# Patient Record
Sex: MALE | Race: WHITE | Employment: OTHER | URBAN - METROPOLITAN AREA
[De-identification: names, ages, dates, MRNs, and addresses within clinical notes are randomized per-mention and may not be internally consistent; named-entity substitution may affect disease eponyms.]

---

## 2017-01-09 ENCOUNTER — HOSPITAL ENCOUNTER (OUTPATIENT)
Dept: CT IMAGING | Age: 72
Discharge: HOME | DRG: 707 | End: 2017-01-09
Attending: UROLOGY
Payer: MEDICARE

## 2017-01-09 DIAGNOSIS — C61 PROSTATE CANCER (HCC): ICD-10-CM

## 2017-01-09 DIAGNOSIS — N28.89 RENAL MASS: ICD-10-CM

## 2017-01-10 NOTE — PROGRESS NOTES
I reviewed with pt. Explained that this may represent a met but would be difficult to biopsy given size. I offered pulm consult prior to any intervention but pt opted to proceed with previous plan and have this followed post op.

## 2017-01-11 ENCOUNTER — ANESTHESIA EVENT (OUTPATIENT)
Dept: SURGERY | Age: 72
DRG: 707 | End: 2017-01-11
Payer: MEDICARE

## 2017-01-12 ENCOUNTER — SURGERY (OUTPATIENT)
Age: 72
End: 2017-01-12

## 2017-01-12 ENCOUNTER — ANESTHESIA (OUTPATIENT)
Dept: SURGERY | Age: 72
DRG: 707 | End: 2017-01-12
Payer: MEDICARE

## 2017-01-12 PROBLEM — C61 PROSTATE CANCER (HCC): Status: ACTIVE | Noted: 2017-01-12

## 2017-01-12 PROCEDURE — 77030019908 HC STETH ESOPH SIMS -A: Performed by: ANESTHESIOLOGY

## 2017-01-12 PROCEDURE — 74011250636 HC RX REV CODE- 250/636: Performed by: UROLOGY

## 2017-01-12 PROCEDURE — 74011250636 HC RX REV CODE- 250/636

## 2017-01-12 PROCEDURE — 77030008703 HC TU ET UNCUF COVD -A: Performed by: ANESTHESIOLOGY

## 2017-01-12 PROCEDURE — 74011000250 HC RX REV CODE- 250

## 2017-01-12 PROCEDURE — 77030008477 HC STYL SATN SLP COVD -A: Performed by: ANESTHESIOLOGY

## 2017-01-12 PROCEDURE — 74011250636 HC RX REV CODE- 250/636: Performed by: ANESTHESIOLOGY

## 2017-01-12 PROCEDURE — 77030020782 HC GWN BAIR PAWS FLX 3M -B: Performed by: ANESTHESIOLOGY

## 2017-01-12 PROCEDURE — 77030008771 HC TU NG SALEM SUMP -A: Performed by: ANESTHESIOLOGY

## 2017-01-12 RX ORDER — PROPOFOL 10 MG/ML
INJECTION, EMULSION INTRAVENOUS AS NEEDED
Status: DISCONTINUED | OUTPATIENT
Start: 2017-01-12 | End: 2017-01-12 | Stop reason: HOSPADM

## 2017-01-12 RX ORDER — PROMETHAZINE HYDROCHLORIDE 25 MG/ML
INJECTION, SOLUTION INTRAMUSCULAR; INTRAVENOUS AS NEEDED
Status: DISCONTINUED | OUTPATIENT
Start: 2017-01-12 | End: 2017-01-12 | Stop reason: HOSPADM

## 2017-01-12 RX ORDER — FENTANYL CITRATE 50 UG/ML
INJECTION, SOLUTION INTRAMUSCULAR; INTRAVENOUS AS NEEDED
Status: DISCONTINUED | OUTPATIENT
Start: 2017-01-12 | End: 2017-01-12 | Stop reason: HOSPADM

## 2017-01-12 RX ORDER — DEXAMETHASONE SODIUM PHOSPHATE 4 MG/ML
INJECTION, SOLUTION INTRA-ARTICULAR; INTRALESIONAL; INTRAMUSCULAR; INTRAVENOUS; SOFT TISSUE AS NEEDED
Status: DISCONTINUED | OUTPATIENT
Start: 2017-01-12 | End: 2017-01-12 | Stop reason: HOSPADM

## 2017-01-12 RX ORDER — ROCURONIUM BROMIDE 10 MG/ML
INJECTION, SOLUTION INTRAVENOUS AS NEEDED
Status: DISCONTINUED | OUTPATIENT
Start: 2017-01-12 | End: 2017-01-12 | Stop reason: HOSPADM

## 2017-01-12 RX ORDER — CEFAZOLIN SODIUM 1 G/3ML
INJECTION, POWDER, FOR SOLUTION INTRAMUSCULAR; INTRAVENOUS AS NEEDED
Status: DISCONTINUED | OUTPATIENT
Start: 2017-01-12 | End: 2017-01-12 | Stop reason: HOSPADM

## 2017-01-12 RX ORDER — ONDANSETRON 2 MG/ML
INJECTION INTRAMUSCULAR; INTRAVENOUS AS NEEDED
Status: DISCONTINUED | OUTPATIENT
Start: 2017-01-12 | End: 2017-01-12 | Stop reason: HOSPADM

## 2017-01-12 RX ORDER — LIDOCAINE HYDROCHLORIDE 20 MG/ML
INJECTION, SOLUTION EPIDURAL; INFILTRATION; INTRACAUDAL; PERINEURAL AS NEEDED
Status: DISCONTINUED | OUTPATIENT
Start: 2017-01-12 | End: 2017-01-12 | Stop reason: HOSPADM

## 2017-01-12 RX ORDER — GLYCOPYRROLATE 0.2 MG/ML
INJECTION INTRAMUSCULAR; INTRAVENOUS AS NEEDED
Status: DISCONTINUED | OUTPATIENT
Start: 2017-01-12 | End: 2017-01-12 | Stop reason: HOSPADM

## 2017-01-12 RX ORDER — NEOSTIGMINE METHYLSULFATE 1 MG/ML
INJECTION INTRAVENOUS AS NEEDED
Status: DISCONTINUED | OUTPATIENT
Start: 2017-01-12 | End: 2017-01-12 | Stop reason: HOSPADM

## 2017-01-12 RX ADMIN — ROCURONIUM BROMIDE 10 MG: 10 INJECTION, SOLUTION INTRAVENOUS at 09:15

## 2017-01-12 RX ADMIN — GLYCOPYRROLATE 0.4 MG: 0.2 INJECTION INTRAMUSCULAR; INTRAVENOUS at 11:34

## 2017-01-12 RX ADMIN — DEXAMETHASONE SODIUM PHOSPHATE 4 MG: 4 INJECTION, SOLUTION INTRA-ARTICULAR; INTRALESIONAL; INTRAMUSCULAR; INTRAVENOUS; SOFT TISSUE at 07:55

## 2017-01-12 RX ADMIN — ROCURONIUM BROMIDE 5 MG: 10 INJECTION, SOLUTION INTRAVENOUS at 10:47

## 2017-01-12 RX ADMIN — CEFAZOLIN SODIUM 2 G: 1 INJECTION, POWDER, FOR SOLUTION INTRAMUSCULAR; INTRAVENOUS at 11:30

## 2017-01-12 RX ADMIN — ROCURONIUM BROMIDE 5 MG: 10 INJECTION, SOLUTION INTRAVENOUS at 10:32

## 2017-01-12 RX ADMIN — PROMETHAZINE HYDROCHLORIDE 12.5 MG: 25 INJECTION, SOLUTION INTRAMUSCULAR; INTRAVENOUS at 11:49

## 2017-01-12 RX ADMIN — NEOSTIGMINE METHYLSULFATE 3 MG: 1 INJECTION INTRAVENOUS at 11:34

## 2017-01-12 RX ADMIN — ROCURONIUM BROMIDE 10 MG: 10 INJECTION, SOLUTION INTRAVENOUS at 08:25

## 2017-01-12 RX ADMIN — FENTANYL CITRATE 50 MCG: 50 INJECTION, SOLUTION INTRAMUSCULAR; INTRAVENOUS at 08:49

## 2017-01-12 RX ADMIN — LIDOCAINE HYDROCHLORIDE 70 MG: 20 INJECTION, SOLUTION EPIDURAL; INFILTRATION; INTRACAUDAL; PERINEURAL at 07:42

## 2017-01-12 RX ADMIN — CEFAZOLIN 2 G: 1 INJECTION, POWDER, FOR SOLUTION INTRAMUSCULAR; INTRAVENOUS; PARENTERAL at 07:44

## 2017-01-12 RX ADMIN — BUPIVACAINE HYDROCHLORIDE 20 ML: 2.5 INJECTION, SOLUTION EPIDURAL; INFILTRATION; INTRACAUDAL; PERINEURAL at 11:46

## 2017-01-12 RX ADMIN — FENTANYL CITRATE 25 MCG: 50 INJECTION, SOLUTION INTRAMUSCULAR; INTRAVENOUS at 09:31

## 2017-01-12 RX ADMIN — SODIUM CHLORIDE, SODIUM LACTATE, POTASSIUM CHLORIDE, AND CALCIUM CHLORIDE: 600; 310; 30; 20 INJECTION, SOLUTION INTRAVENOUS at 08:25

## 2017-01-12 RX ADMIN — ROCURONIUM BROMIDE 10 MG: 10 INJECTION, SOLUTION INTRAVENOUS at 08:45

## 2017-01-12 RX ADMIN — PROPOFOL 200 MG: 10 INJECTION, EMULSION INTRAVENOUS at 07:42

## 2017-01-12 RX ADMIN — FENTANYL CITRATE 25 MCG: 50 INJECTION, SOLUTION INTRAMUSCULAR; INTRAVENOUS at 11:30

## 2017-01-12 RX ADMIN — FENTANYL CITRATE 25 MCG: 50 INJECTION, SOLUTION INTRAMUSCULAR; INTRAVENOUS at 11:39

## 2017-01-12 RX ADMIN — ROCURONIUM BROMIDE 10 MG: 10 INJECTION, SOLUTION INTRAVENOUS at 09:31

## 2017-01-12 RX ADMIN — ROCURONIUM BROMIDE 10 MG: 10 INJECTION, SOLUTION INTRAVENOUS at 08:06

## 2017-01-12 RX ADMIN — SODIUM CHLORIDE, SODIUM LACTATE, POTASSIUM CHLORIDE, AND CALCIUM CHLORIDE: 600; 310; 30; 20 INJECTION, SOLUTION INTRAVENOUS at 11:21

## 2017-01-12 RX ADMIN — FENTANYL CITRATE 100 MCG: 50 INJECTION, SOLUTION INTRAMUSCULAR; INTRAVENOUS at 07:42

## 2017-01-12 RX ADMIN — ONDANSETRON 4 MG: 2 INJECTION INTRAMUSCULAR; INTRAVENOUS at 11:20

## 2017-01-12 RX ADMIN — FENTANYL CITRATE 50 MCG: 50 INJECTION, SOLUTION INTRAMUSCULAR; INTRAVENOUS at 08:13

## 2017-01-12 RX ADMIN — ROCURONIUM BROMIDE 40 MG: 10 INJECTION, SOLUTION INTRAVENOUS at 07:43

## 2017-01-12 RX ADMIN — FENTANYL CITRATE 25 MCG: 50 INJECTION, SOLUTION INTRAMUSCULAR; INTRAVENOUS at 09:46

## 2017-01-12 RX ADMIN — ROCURONIUM BROMIDE 10 MG: 10 INJECTION, SOLUTION INTRAVENOUS at 10:06

## 2017-01-12 NOTE — ANESTHESIA PREPROCEDURE EVALUATION
Anesthetic History   No history of anesthetic complications            Review of Systems / Medical History  Patient summary reviewed, nursing notes reviewed and pertinent labs reviewed    Pulmonary          Undiagnosed apnea         Neuro/Psych              Cardiovascular    Hypertension: well controlled        Dysrhythmias : atrial fibrillation  Hyperlipidemia    Exercise tolerance: >4 METS     GI/Hepatic/Renal     GERD: well controlled    Renal disease: CRI      Comments: UC Endo/Other        Arthritis     Other Findings              Physical Exam    Airway  Mallampati: II  TM Distance: 4 - 6 cm  Neck ROM: normal range of motion   Mouth opening: Normal     Cardiovascular  Regular rate and rhythm,  S1 and S2 normal,  no murmur, click, rub, or gallop  Rhythm: regular           Dental    Dentition: Full upper dentures     Pulmonary  Breath sounds clear to auscultation               Abdominal  GI exam deferred       Other Findings            Anesthetic Plan    ASA: 2  Anesthesia type: general          Induction: Intravenous  Anesthetic plan and risks discussed with: Patient

## 2017-01-12 NOTE — ANESTHESIA POSTPROCEDURE EVALUATION
Post-Anesthesia Evaluation and Assessment    Patient: Anjel Hamilton MRN: [id number]  SSN: [id number]    YOB: 1945  Age: 80 y.o. Sex: male       Cardiovascular Function/Vital Signs  Visit Vitals    /85    Pulse 78    Temp 36.5 °C (97.7 °F)    Resp 12    SpO2 96%       Patient is status post general anesthesia for Procedure(s):  PROSTATECTOMY ROBOTIC ASSISTED WITH RIGHT LYMPH NODE DISSECTION. Nausea/Vomiting: None    Postoperative hydration reviewed and adequate. Pain:  Pain Scale 1: Visual (01/12/17 1337)  Pain Intensity 1: 9 (01/12/17 1206)   Managed    Neurological Status:   Neuro (WDL): Exceptions to WDL (01/12/17 1238)  Neuro  Neurologic State: Drowsy (01/12/17 1238)  LUE Motor Response: Purposeful (01/12/17 1238)  LLE Motor Response: Purposeful (01/12/17 1238)  RUE Motor Response: Purposeful (01/12/17 1238)  RLE Motor Response: Purposeful (01/12/17 1238)   At baseline    Mental Status and Level of Consciousness: Arousable    Pulmonary Status:   O2 Device: Nasal cannula (01/12/17 1157)   Adequate oxygenation and airway patent    Complications related to anesthesia: None    Post-anesthesia assessment completed.  No concerns    Signed By: Jennifer Herrera MD     January 12, 2017

## 2017-01-29 ENCOUNTER — ANESTHESIA EVENT (OUTPATIENT)
Dept: SURGERY | Age: 72
End: 2017-01-29
Payer: MEDICARE

## 2017-01-29 ENCOUNTER — ANESTHESIA (OUTPATIENT)
Dept: SURGERY | Age: 72
End: 2017-01-29
Payer: MEDICARE

## 2017-01-29 ENCOUNTER — HOSPITAL ENCOUNTER (OUTPATIENT)
Age: 72
Setting detail: OBSERVATION
Discharge: HOME | End: 2017-01-31
Attending: EMERGENCY MEDICINE | Admitting: UROLOGY
Payer: MEDICARE

## 2017-01-29 DIAGNOSIS — R33.9 URINARY RETENTION: Primary | ICD-10-CM

## 2017-01-29 LAB
ALBUMIN SERPL BCP-MCNC: 3.6 G/DL (ref 3.2–4.6)
ALBUMIN/GLOB SERPL: 0.9 {RATIO} (ref 1.2–3.5)
ALP SERPL-CCNC: 79 U/L (ref 50–136)
ALT SERPL-CCNC: 18 U/L (ref 12–65)
ANION GAP BLD CALC-SCNC: 16 MMOL/L (ref 7–16)
AST SERPL W P-5'-P-CCNC: 16 U/L (ref 15–37)
BASOPHILS # BLD AUTO: 0.1 K/UL (ref 0–0.2)
BASOPHILS # BLD: 1 % (ref 0–2)
BILIRUB SERPL-MCNC: 0.4 MG/DL (ref 0.2–1.1)
BUN SERPL-MCNC: 39 MG/DL (ref 8–23)
CALCIUM SERPL-MCNC: 8.9 MG/DL (ref 8.3–10.4)
CHLORIDE SERPL-SCNC: 103 MMOL/L (ref 98–107)
CO2 SERPL-SCNC: 18 MMOL/L (ref 21–32)
CREAT SERPL-MCNC: 2.27 MG/DL (ref 0.8–1.5)
DIFFERENTIAL METHOD BLD: ABNORMAL
EOSINOPHIL # BLD: 0.1 K/UL (ref 0–0.8)
EOSINOPHIL NFR BLD: 1 % (ref 0.5–7.8)
ERYTHROCYTE [DISTWIDTH] IN BLOOD BY AUTOMATED COUNT: 13.8 % (ref 11.9–14.6)
GLOBULIN SER CALC-MCNC: 3.8 G/DL (ref 2.3–3.5)
GLUCOSE SERPL-MCNC: 156 MG/DL (ref 65–100)
HCT VFR BLD AUTO: 36.5 % (ref 41.1–50.3)
HGB BLD-MCNC: 12.2 G/DL (ref 13.6–17.2)
IMM GRANULOCYTES # BLD: 0 K/UL (ref 0–0.5)
IMM GRANULOCYTES NFR BLD AUTO: 0 % (ref 0–5)
LYMPHOCYTES # BLD AUTO: 33 % (ref 13–44)
LYMPHOCYTES # BLD: 4.4 K/UL (ref 0.5–4.6)
MCH RBC QN AUTO: 30 PG (ref 26.1–32.9)
MCHC RBC AUTO-ENTMCNC: 33.4 G/DL (ref 31.4–35)
MCV RBC AUTO: 89.7 FL (ref 79.6–97.8)
MONOCYTES # BLD: 0.9 K/UL (ref 0.1–1.3)
MONOCYTES NFR BLD AUTO: 7 % (ref 4–12)
NEUTS SEG # BLD: 7.8 K/UL (ref 1.7–8.2)
NEUTS SEG NFR BLD AUTO: 58 % (ref 43–78)
PLATELET # BLD AUTO: 397 K/UL (ref 150–450)
PLATELET COMMENTS,PCOM: ADEQUATE
PMV BLD AUTO: 10.3 FL (ref 10.8–14.1)
POTASSIUM SERPL-SCNC: 3.4 MMOL/L (ref 3.5–5.1)
PROT SERPL-MCNC: 7.4 G/DL (ref 6.3–8.2)
RBC # BLD AUTO: 4.07 M/UL (ref 4.23–5.67)
RBC MORPH BLD: ABNORMAL
SODIUM SERPL-SCNC: 137 MMOL/L (ref 136–145)
WBC # BLD AUTO: 13.3 K/UL (ref 4.3–11.1)
WBC MORPH BLD: ABNORMAL

## 2017-01-29 PROCEDURE — 77030005520 HC CATH URETH FOL38 BARD -A

## 2017-01-29 PROCEDURE — 51700 IRRIGATION OF BLADDER: CPT | Performed by: EMERGENCY MEDICINE

## 2017-01-29 PROCEDURE — A9270 NON-COVERED ITEM OR SERVICE: HCPCS | Performed by: UROLOGY

## 2017-01-29 PROCEDURE — 77030032490 HC SLV COMPR SCD KNE COVD -B: Performed by: UROLOGY

## 2017-01-29 PROCEDURE — 74011250636 HC RX REV CODE- 250/636

## 2017-01-29 PROCEDURE — 85025 COMPLETE CBC W/AUTO DIFF WBC: CPT | Performed by: EMERGENCY MEDICINE

## 2017-01-29 PROCEDURE — 80053 COMPREHEN METABOLIC PANEL: CPT | Performed by: EMERGENCY MEDICINE

## 2017-01-29 PROCEDURE — 76060000032 HC ANESTHESIA 0.5 TO 1 HR: Performed by: UROLOGY

## 2017-01-29 PROCEDURE — 74011250636 HC RX REV CODE- 250/636: Performed by: EMERGENCY MEDICINE

## 2017-01-29 PROCEDURE — 77030019927 HC TBNG IRR CYSTO BAXT -A: Performed by: UROLOGY

## 2017-01-29 PROCEDURE — 51702 INSERT TEMP BLADDER CATH: CPT | Performed by: EMERGENCY MEDICINE

## 2017-01-29 PROCEDURE — 77030008771 HC TU NG SALEM SUMP -A: Performed by: ANESTHESIOLOGY

## 2017-01-29 PROCEDURE — 81003 URINALYSIS AUTO W/O SCOPE: CPT | Performed by: EMERGENCY MEDICINE

## 2017-01-29 PROCEDURE — 74011250637 HC RX REV CODE- 250/637: Performed by: UROLOGY

## 2017-01-29 PROCEDURE — 74011250636 HC RX REV CODE- 250/636: Performed by: UROLOGY

## 2017-01-29 PROCEDURE — 96375 TX/PRO/DX INJ NEW DRUG ADDON: CPT | Performed by: EMERGENCY MEDICINE

## 2017-01-29 PROCEDURE — 99283 EMERGENCY DEPT VISIT LOW MDM: CPT | Performed by: EMERGENCY MEDICINE

## 2017-01-29 PROCEDURE — 99218 HC RM OBSERVATION: CPT

## 2017-01-29 PROCEDURE — C1769 GUIDE WIRE: HCPCS | Performed by: UROLOGY

## 2017-01-29 PROCEDURE — 77030008703 HC TU ET UNCUF COVD -A: Performed by: ANESTHESIOLOGY

## 2017-01-29 PROCEDURE — 74011000250 HC RX REV CODE- 250

## 2017-01-29 PROCEDURE — G0378 HOSPITAL OBSERVATION PER HR: HCPCS

## 2017-01-29 PROCEDURE — 76010000138 HC OR TIME 0.5 TO 1 HR: Performed by: UROLOGY

## 2017-01-29 PROCEDURE — 77030008477 HC STYL SATN SLP COVD -A: Performed by: ANESTHESIOLOGY

## 2017-01-29 PROCEDURE — 74011000258 HC RX REV CODE- 258: Performed by: UROLOGY

## 2017-01-29 PROCEDURE — 77030018832 HC SOL IRR H20 ICUM -A: Performed by: UROLOGY

## 2017-01-29 PROCEDURE — 96374 THER/PROPH/DIAG INJ IV PUSH: CPT | Performed by: EMERGENCY MEDICINE

## 2017-01-29 PROCEDURE — 77030011942 HC CATH URETH FOL46 BARD -B: Performed by: UROLOGY

## 2017-01-29 PROCEDURE — 77030018836 HC SOL IRR NACL ICUM -A

## 2017-01-29 PROCEDURE — 76210000006 HC OR PH I REC 0.5 TO 1 HR: Performed by: UROLOGY

## 2017-01-29 PROCEDURE — 74011636637 HC RX REV CODE- 636/637: Performed by: UROLOGY

## 2017-01-29 RX ORDER — SODIUM CHLORIDE, SODIUM LACTATE, POTASSIUM CHLORIDE, CALCIUM CHLORIDE 600; 310; 30; 20 MG/100ML; MG/100ML; MG/100ML; MG/100ML
INJECTION, SOLUTION INTRAVENOUS
Status: DISCONTINUED | OUTPATIENT
Start: 2017-01-29 | End: 2017-01-29 | Stop reason: HOSPADM

## 2017-01-29 RX ORDER — PREDNISONE 5 MG/1
5 TABLET ORAL
Status: DISCONTINUED | OUTPATIENT
Start: 2017-01-29 | End: 2017-01-31 | Stop reason: HOSPADM

## 2017-01-29 RX ORDER — FENTANYL CITRATE 50 UG/ML
INJECTION, SOLUTION INTRAMUSCULAR; INTRAVENOUS AS NEEDED
Status: DISCONTINUED | OUTPATIENT
Start: 2017-01-29 | End: 2017-01-29 | Stop reason: HOSPADM

## 2017-01-29 RX ORDER — PREDNISONE 5 MG/1
5 TABLET ORAL
Status: DISCONTINUED | OUTPATIENT
Start: 2017-01-30 | End: 2017-01-31 | Stop reason: HOSPADM

## 2017-01-29 RX ORDER — DULOXETIN HYDROCHLORIDE 30 MG/1
30 CAPSULE, DELAYED RELEASE ORAL
Status: DISCONTINUED | OUTPATIENT
Start: 2017-01-29 | End: 2017-01-31 | Stop reason: HOSPADM

## 2017-01-29 RX ORDER — NALOXONE HYDROCHLORIDE 0.4 MG/ML
0.4 INJECTION, SOLUTION INTRAMUSCULAR; INTRAVENOUS; SUBCUTANEOUS AS NEEDED
Status: DISCONTINUED | OUTPATIENT
Start: 2017-01-29 | End: 2017-01-31 | Stop reason: HOSPADM

## 2017-01-29 RX ORDER — PANTOPRAZOLE SODIUM 40 MG/1
40 TABLET, DELAYED RELEASE ORAL
Status: DISCONTINUED | OUTPATIENT
Start: 2017-01-30 | End: 2017-01-31 | Stop reason: HOSPADM

## 2017-01-29 RX ORDER — SUCCINYLCHOLINE CHLORIDE 20 MG/ML
INJECTION INTRAMUSCULAR; INTRAVENOUS AS NEEDED
Status: DISCONTINUED | OUTPATIENT
Start: 2017-01-29 | End: 2017-01-29 | Stop reason: HOSPADM

## 2017-01-29 RX ORDER — SODIUM CHLORIDE 0.9 % (FLUSH) 0.9 %
5-10 SYRINGE (ML) INJECTION EVERY 8 HOURS
Status: DISCONTINUED | OUTPATIENT
Start: 2017-01-29 | End: 2017-01-31 | Stop reason: HOSPADM

## 2017-01-29 RX ORDER — PROPOFOL 10 MG/ML
INJECTION, EMULSION INTRAVENOUS AS NEEDED
Status: DISCONTINUED | OUTPATIENT
Start: 2017-01-29 | End: 2017-01-29 | Stop reason: HOSPADM

## 2017-01-29 RX ORDER — ACETAMINOPHEN 325 MG/1
650 TABLET ORAL
Status: DISCONTINUED | OUTPATIENT
Start: 2017-01-29 | End: 2017-01-31 | Stop reason: HOSPADM

## 2017-01-29 RX ORDER — HYDROMORPHONE HYDROCHLORIDE 1 MG/ML
1 INJECTION, SOLUTION INTRAMUSCULAR; INTRAVENOUS; SUBCUTANEOUS
Status: DISCONTINUED | OUTPATIENT
Start: 2017-01-29 | End: 2017-01-31 | Stop reason: HOSPADM

## 2017-01-29 RX ORDER — FLECAINIDE ACETATE 100 MG/1
50 TABLET ORAL 2 TIMES DAILY
Status: DISCONTINUED | OUTPATIENT
Start: 2017-01-30 | End: 2017-01-31 | Stop reason: HOSPADM

## 2017-01-29 RX ORDER — DILTIAZEM HYDROCHLORIDE 180 MG/1
180 CAPSULE, COATED, EXTENDED RELEASE ORAL DAILY
Status: DISCONTINUED | OUTPATIENT
Start: 2017-01-30 | End: 2017-01-31 | Stop reason: HOSPADM

## 2017-01-29 RX ORDER — LIDOCAINE HYDROCHLORIDE 20 MG/ML
INJECTION, SOLUTION EPIDURAL; INFILTRATION; INTRACAUDAL; PERINEURAL AS NEEDED
Status: DISCONTINUED | OUTPATIENT
Start: 2017-01-29 | End: 2017-01-29 | Stop reason: HOSPADM

## 2017-01-29 RX ORDER — DEXTROSE MONOHYDRATE AND SODIUM CHLORIDE 5; .45 G/100ML; G/100ML
100 INJECTION, SOLUTION INTRAVENOUS CONTINUOUS
Status: DISCONTINUED | OUTPATIENT
Start: 2017-01-29 | End: 2017-01-30

## 2017-01-29 RX ORDER — MESALAMINE 800 MG/1
1600 TABLET, DELAYED RELEASE ORAL 3 TIMES DAILY
Status: DISCONTINUED | OUTPATIENT
Start: 2017-01-29 | End: 2017-01-29

## 2017-01-29 RX ORDER — MESALAMINE 800 MG/1
800 TABLET, DELAYED RELEASE ORAL 2 TIMES DAILY
Status: DISCONTINUED | OUTPATIENT
Start: 2017-01-30 | End: 2017-01-31 | Stop reason: HOSPADM

## 2017-01-29 RX ORDER — SODIUM CHLORIDE 0.9 % (FLUSH) 0.9 %
5-10 SYRINGE (ML) INJECTION AS NEEDED
Status: DISCONTINUED | OUTPATIENT
Start: 2017-01-29 | End: 2017-01-31 | Stop reason: HOSPADM

## 2017-01-29 RX ORDER — CEFAZOLIN SODIUM IN 0.9 % NACL 2 G/50 ML
INTRAVENOUS SOLUTION, PIGGYBACK (ML) INTRAVENOUS AS NEEDED
Status: DISCONTINUED | OUTPATIENT
Start: 2017-01-29 | End: 2017-01-29 | Stop reason: HOSPADM

## 2017-01-29 RX ORDER — HYDROMORPHONE HYDROCHLORIDE 1 MG/ML
1 INJECTION, SOLUTION INTRAMUSCULAR; INTRAVENOUS; SUBCUTANEOUS
Status: COMPLETED | OUTPATIENT
Start: 2017-01-29 | End: 2017-01-29

## 2017-01-29 RX ORDER — ONDANSETRON 2 MG/ML
4 INJECTION INTRAMUSCULAR; INTRAVENOUS
Status: DISCONTINUED | OUTPATIENT
Start: 2017-01-29 | End: 2017-01-31 | Stop reason: HOSPADM

## 2017-01-29 RX ORDER — HYDROCODONE BITARTRATE AND ACETAMINOPHEN 5; 325 MG/1; MG/1
1 TABLET ORAL
Status: DISCONTINUED | OUTPATIENT
Start: 2017-01-29 | End: 2017-01-31 | Stop reason: HOSPADM

## 2017-01-29 RX ADMIN — MESALAMINE 800 MG: 800 TABLET, DELAYED RELEASE ORAL at 23:54

## 2017-01-29 RX ADMIN — DEXTROSE MONOHYDRATE AND SODIUM CHLORIDE 100 ML/HR: 5; .45 INJECTION, SOLUTION INTRAVENOUS at 23:22

## 2017-01-29 RX ADMIN — FENTANYL CITRATE 100 MCG: 50 INJECTION, SOLUTION INTRAMUSCULAR; INTRAVENOUS at 20:45

## 2017-01-29 RX ADMIN — SODIUM CHLORIDE, SODIUM LACTATE, POTASSIUM CHLORIDE, CALCIUM CHLORIDE: 600; 310; 30; 20 INJECTION, SOLUTION INTRAVENOUS at 20:42

## 2017-01-29 RX ADMIN — Medication 2 G: at 21:00

## 2017-01-29 RX ADMIN — HYDROMORPHONE HYDROCHLORIDE 1 MG: 1 INJECTION, SOLUTION INTRAMUSCULAR; INTRAVENOUS; SUBCUTANEOUS at 19:16

## 2017-01-29 RX ADMIN — Medication 10 ML: at 23:26

## 2017-01-29 RX ADMIN — HYDROMORPHONE HYDROCHLORIDE 1 MG: 1 INJECTION, SOLUTION INTRAMUSCULAR; INTRAVENOUS; SUBCUTANEOUS at 18:05

## 2017-01-29 RX ADMIN — SUCCINYLCHOLINE CHLORIDE 160 MG: 20 INJECTION INTRAMUSCULAR; INTRAVENOUS at 20:47

## 2017-01-29 RX ADMIN — ONDANSETRON 4 MG: 2 INJECTION INTRAMUSCULAR; INTRAVENOUS at 23:50

## 2017-01-29 RX ADMIN — HYDROCHLOROTHIAZIDE: 25 TABLET ORAL at 23:36

## 2017-01-29 RX ADMIN — PREDNISONE 5 MG: 5 TABLET ORAL at 23:21

## 2017-01-29 RX ADMIN — PROPOFOL 120 MG: 10 INJECTION, EMULSION INTRAVENOUS at 20:47

## 2017-01-29 RX ADMIN — LIDOCAINE HYDROCHLORIDE 80 MG: 20 INJECTION, SOLUTION EPIDURAL; INFILTRATION; INTRACAUDAL; PERINEURAL at 20:47

## 2017-01-29 NOTE — IP AVS SNAPSHOT
Surendra Shed 
 
 
 [facility] 
[phone] Patient: Gianluca Do MRN: [id number] :1945 You are allergic to the following Allergen Reactions Codeine Other (comments) \"jittery\" Recent Documentation Height Weight BMI Smoking Status 1.778 m 85.7 kg 27.12 kg/m2 Former Smoker Unresulted Labs Order Current Status METABOLIC PANEL, BASIC In process Emergency Contacts Name Discharge Info Relation Home Work Mobile Vikki Raymond DISCHARGE CAREGIVER [3] Spouse [3] [phone] About your hospitalization You were admitted on:  2017 You last received care in the:  [facility] 6 MED SURG You were discharged on:  2017 Unit phone number:  [phone] Why you were hospitalized Your primary diagnosis was:  Not on File Providers Seen During Your Hospitalizations Provider Role Specialty Primary office phone Kitty Lewis DO Attending Provider Emergency Medicine [phone] Nicki Nielson MD Attending Provider Urology [phone] Your Primary Care Physician (PCP) Primary Care Physician Office Phone Office Fax 5927 [address] [phone] Follow-up Information Follow up With Details Comments Contact Info Ramona Guadarrama MD   [address] [facility] [id number] [phone] Jazmine Horton NP On 2017 at [address] [place] [id number] 
[phone] Your Appointments 2017 12:30 PM EST  
SC PT INITIAL VISIT URINARY with Ravi Vitale, PT  
[facility] PT ([facility]) [address] [place] [id number] [phone] Please arrive 10 to 15 minutes prior to your appointment time.   Wear comfortable clothing. For patients with knee involvement, you may want to bring shorts. Please bring your insurance cards with you. Please bring a list of your medications including herbal supplements. Please bring a list of your allergies including food allergies. Monday February 06, 2017  8:20 AM EST Office Visit with Lexie Em NP [facility] ([facility]) [address] [address]  
[phone] Monday March 13, 2017  3:10 PM EDT Office Visit with Mora Blizzard, DO [facility] ([facility]) [address] [address]  
[phone] Current Discharge Medication List  
  
CONTINUE these medications which have NOT CHANGED Dose & Instructions Dispensing Information Comments Morning Noon Evening Bedtime * ASACOL 400 mg EC tablet Generic drug:  mesalamine EC Your next dose is: Today Take  by mouth three (3) times daily. Indications: ULCERATIVE COLITIS Refills:  0  
     
   
   
  
   
  
  
 * mesalamine  mg DR tablet Commonly known as:  ASACOL HD Dose:  800 mg Take 800 mg by mouth. Refills:  0  
     
   
   
   
  
 dilTIAZem  mg XR capsule Commonly known as:  DILACOR XR Your next dose is: Today Dose:  180 mg Take 180 mg by mouth nightly. Refills:  0 DULoxetine 30 mg capsule Commonly known as:  CYMBALTA Your next dose is: Today Dose:  30 mg Take 30 mg by mouth nightly. Refills:  0  
     
   
   
   
  
  
 EYE-EMILY EXTRA + LUTEIN PO Your next dose is:  Tomorrow Take  by mouth. Stop seven days prior to surgery per anesthesia protocol. Refills:  0  
     
   
   
   
  
 flecainide 50 mg tablet Commonly known as:  TAMBOCOR Your next dose is: Today Dose:  50 mg Take 50 mg by mouth two (2) times a day. Refills:  0  
     
   
   
  
   
  
 * HYDROcodone-acetaminophen 7.5-325 mg per tablet Commonly known as:  NORCO  
   
 TAKE ONE TABLET BY MOUTH ONE TIME DAILY AS NEEDED Refills:  0  
     
   
   
   
  
 * HYDROcodone-acetaminophen 7.5-325 mg per tablet Commonly known as:  Belinda Moses Dose:  2 Tab Take 2 Tabs by mouth every four (4) hours as needed. Max Daily Amount: 12 Tabs. Quantity:  25 Tab Refills:  0 LIPITOR 40 mg tablet Generic drug:  atorvastatin Your next dose is:  Tomorrow Dose:  40 mg Take 40 mg by mouth every morning. Take day of surgery per anesthesia protocol. Indications: HYPERCHOLESTEROLEMIA Refills:  0  
     
   
   
   
  
 losartan-hydroCHLOROthiazide 100-25 mg per tablet Commonly known as:  HYZAAR Your next dose is: Today TAKE 1/2 TO 1 TABLET DAILY AS DIRECTED FOR HIGH BLOOD PRESSURE , hs  
 Refills:  0  
     
   
   
   
  
  
 oxybutynin 5 mg tablet Commonly known as:  TYJOUSJF Dose:  5 mg Take 1 Tab by mouth every six (6) hours as needed for Other (Bladder Spasm). Quantity:  20 Tab Refills:  0  
     
   
   
   
  
 * predniSONE 5 mg tablet Commonly known as:  Reino Suárez Your next dose is: Today Dose:  5 mg Take 5 mg by mouth nightly. Indications: ULCERATIVE COLITIS Refills:  0  
     
   
   
   
  
  
 * predniSONE 5 mg tablet Commonly known as:  Reino Suárez Your next dose is:  Tomorrow Dose:  5 mg Take 5 mg by mouth. Refills:  0 PROTONIX 20 mg tablet Generic drug:  pantoprazole Your next dose is:  Tomorrow Dose:  20 mg Take 20 mg by mouth every morning. Take day of surgery per anesthesia protocol. Indications: GASTROESOPHAGEAL REFLUX Refills:  0 REMICADE IV  
   
 by IntraVENous route. Last infusion  12/11/2016 Refills:  0  
     
   
   
   
  
 tadalafil 20 mg tablet Commonly known as:  CIALIS  
   
 Dose:  20 mg Take 1 Tab by mouth as needed. Quantity:  6 Tab Refills:  99 VITAMIN D2 50,000 unit capsule Generic drug:  ergocalciferol Dose:  83524 Units Take 50,000 Units by mouth every Monday. Refills:  0  
     
   
   
   
  
 * Notice: This list has 6 medication(s) that are the same as other medications prescribed for you. Read the directions carefully, and ask your doctor or other care provider to review them with you. STOP taking these medications ELIQUIS 2.5 mg tablet Generic drug:  apixaban Discharge Instructions DISCHARGE SUMMARY from Nurse The following personal items are in your possession at time of discharge: 
 
Dental Appliances: Uppers Visual Aid: None Home Medications: Kept at bedside Jewelry: None Clothing: Pants, Shirt, Socks Other Valuables: None Personal Items Sent to Safe: none PATIENT INSTRUCTIONS: 
 
After general anesthesia or intravenous sedation, for 24 hours or while taking prescription Narcotics: · Limit your activities · Do not drive and operate hazardous machinery · Do not make important personal or business decisions · Do  not drink alcoholic beverages · If you have not urinated within 8 hours after discharge, please contact your surgeon on call. Report the following to your surgeon: 
· Excessive pain, swelling, redness or odor of or around the surgical area · Temperature over 100.5 · Nausea and vomiting lasting longer than 4 hours or if unable to take medications · Any signs of decreased circulation or nerve impairment to extremity: change in color, persistent  numbness, tingling, coldness or increase pain · Any questions What to do at Home: 
Recommended activity: No lifting, Driving, or Strenuous exercise for 1 week. Use leg bag during the day and bedside bag at night.    
 
If you experience any of the following symptoms catheter not draining, fever greater than 101, nausea/vomiting, or pain, please follow up with Dr. Rhoda Farmer. *  Please give a list of your current medications to your Primary Care Provider. *  Please update this list whenever your medications are discontinued, doses are 
    changed, or new medications (including over-the-counter products) are added. *  Please carry medication information at all times in case of emergency situations. These are general instructions for a healthy lifestyle: No smoking/ No tobacco products/ Avoid exposure to second hand smoke Surgeon General's Warning:  Quitting smoking now greatly reduces serious risk to your health. Obesity, smoking, and sedentary lifestyle greatly increases your risk for illness A healthy diet, regular physical exercise & weight monitoring are important for maintaining a healthy lifestyle You may be retaining fluid if you have a history of heart failure or if you experience any of the following symptoms:  Weight gain of 3 pounds or more overnight or 5 pounds in a week, increased swelling in our hands or feet or shortness of breath while lying flat in bed. Please call your doctor as soon as you notice any of these symptoms; do not wait until your next office visit. Recognize signs and symptoms of STROKE: 
 
F-face looks uneven A-arms unable to move or move unevenly S-speech slurred or non-existent T-time-call 911 as soon as signs and symptoms begin-DO NOT go Back to bed or wait to see if you get better-TIME IS BRAIN. Warning Signs of HEART ATTACK Call 911 if you have these symptoms: 
? Chest discomfort. Most heart attacks involve discomfort in the center of the chest that lasts more than a few minutes, or that goes away and comes back. It can feel like uncomfortable pressure, squeezing, fullness, or pain. ? Discomfort in other areas of the upper body.  Symptoms can include pain or discomfort in one or both arms, the back, neck, jaw, or stomach. ? Shortness of breath with or without chest discomfort. ? Other signs may include breaking out in a cold sweat, nausea, or lightheadedness. Don't wait more than five minutes to call [address]! Fast action can save your life. Calling 911 is almost always the fastest way to get lifesaving treatment. Emergency Medical Services staff can begin treatment when they arrive  up to an hour sooner than if someone gets to the hospital by car. The discharge information has been reviewed with the patient. The patient verbalized understanding. Discharge medications reviewed with the patient and appropriate educational materials and side effects teaching were provided. Discharge Instructions Attachments/References CYSTOSCOPY: POST-OP (ENGLISH) INDWELLING URINARY CATHETER CARE: GENERAL INFO (ENGLISH) Discharge Orders None Introducing [facility]! Dear Daniel Ribeiro: Thank you for requesting a [employer] account. Our records indicate that you already have an active [employer] account. You can access your account anytime at [email or web address] Did you know that you can access your hospital and ER discharge instructions at any time in [employer]? You can also review all of your test results from your hospital stay or ER visit. Additional Information If you have questions, please visit the Frequently Asked Questions section of the [employer] website at [email or web address] Remember, [employer] is NOT to be used for urgent needs. For medical emergencies, dial 911. Now available from your iPhone and Android! General Information Please provide this summary of care documentation to your next provider. Patient Signature:  ____________________________________________________________ Date:  ____________________________________________________________  
  
Adry Charter Provider Signature:  ____________________________________________________________ Date:  ____________________________________________________________ More Information Cystoscopy: What to Expect at [facility] Your Recovery A cystoscopy is a procedure that lets a doctor look inside of the bladder and the urethra. The urethra is the tube that carries urine from the bladder to outside the body. The doctor uses a thin, lighted tube called a cystoscope to look for kidney or bladder stones, tumors, bleeding, or infection. After the cystoscopy, your urethra may be sore at first, and it may burn when you urinate for the first few days after the procedure. You may feel the need to urinate more often, and your urine may be pink. These symptoms should get better in 1 or 2 days. You will probably be able to go back to work or most of your usual activities in 1 or 2 days. This care sheet gives you a general idea about how long it will take for you to recover. But each person recovers at a different pace. Follow the steps below to get better as quickly as possible. How can you care for yourself at home? Activity · Rest when you feel tired. Getting enough sleep will help you recover. · Try to walk each day. Start by walking a little more than you did the day before. Bit by bit, increase the amount you walk. Walking boosts blood flow and helps prevent pneumonia and constipation. · Avoid strenuous activities, such as bicycle riding, jogging, weight lifting, or aerobic exercise, until your doctor says it is okay. · Ask your doctor when you can drive again. · Most people are able to return to work within 1 or 2 days after the procedure. · You may shower and take baths as usual. 
· Ask your doctor when it is okay for you to have sex. Diet · You can eat your normal diet.  If your stomach is upset, try bland, low-fat foods like plain rice, broiled chicken, toast, and yogurt. · Drink plenty of fluids (unless your doctor tells you not to). Medicines · Take pain medicines exactly as directed. ¨ If the doctor gave you a prescription medicine for pain, take it as prescribed. ¨ If you are not taking a prescription pain medicine, ask your doctor if you can take an over-the-counter medicine. · If you think your pain medicine is making you sick to your stomach: 
¨ Take your medicine after meals (unless your doctor has told you not to). ¨ Ask your doctor for a different pain medicine. · If your doctor prescribed antibiotics, take them as directed. Do not stop taking them just because you feel better. You need to take the full course of antibiotics. Follow-up care is a key part of your treatment and safety. Be sure to make and go to all appointments, and call your doctor if you are having problems. It's also a good idea to know your test results and keep a list of the medicines you take. When should you call for help? Call 911 anytime you think you may need emergency care. For example, call if: 
· You passed out (lost consciousness). · You have severe trouble breathing. · You have sudden chest pain and shortness of breath, or you cough up blood. · You have severe belly pain. Call your doctor now or seek immediate medical care if: 
· You are sick to your stomach or cannot keep fluids down. · Your urine is still red or you see blood clots after you have urinated several times. · You have trouble passing urine or stool, especially if you have pain or swelling in your lower belly. · You have signs of a blood clot, such as: 
¨ Pain in your calf, back of the knee, thigh, or groin. ¨ Redness and swelling in your leg or groin. · You develop a fever or severe chills. · You have pain in your back just below your rib cage. This is called flank pain.  
Watch closely for changes in your health, and be sure to contact your doctor if: 
· You have pain or burning when you urinate. A burning feeling is normal for a day or two after the test, but call if it does not get better. · You have a frequent urge to urinate but can pass only small amounts of urine. · Your urine is pink, red, or cloudy, or smells bad. It is normal for the urine to have a pinkish color for a few days after the test, but call if it does not get better. Where can you learn more? Go to http://benson-heath.info/. Enter R954 in the search box to learn more about \"Cystoscopy: What to Expect at Home. \" Current as of: August 12, 2016 Content Version: 11.1 © 1849-7729 [employer]. Care instructions adapted under license by [employer] (which disclaims liability or warranty for this information). If you have questions about a medical condition or this instruction, always ask your healthcare professional. [place] [address] any warranty or liability for your use of this information. Learning About Urinary Catheter Care to Prevent Infection What is a urinary catheter? A urinary catheter is a flexible plastic tube used to drain urine from your bladder when you can't urinate on your own. The catheter allows urine to drain from the bladder into a bag. Two types of drainage bags may be used with a urinary catheter. · A bedside bag is a large bag that you can hang on the side of your bed or on a chair. You can use it overnight or anytime you will be sitting or lying down for a long time. · A leg bag is a small bag that you can use during the day. It is usually attached to your thigh or calf and hidden under your clothes. Having a urinary catheter increases your risk of getting a urinary tract infection. Germs may get on the catheter and cause an infection in your bladder or kidneys.  The longer you have a catheter, the more likely it is that you will get an infection. You can help prevent this problem with good hygiene and careful handling of your catheter and drainage bags. How can you help prevent infection? Take care to be clean · Always wash your hands well before and after you handle your catheter. · Clean the skin around the catheter twice a day using soap and water. Dry with a clean towel afterward. You can shower with your catheter and drainage bag in place unless your doctor told you not to. · When you clean around the catheter, check the surrounding skin for signs of infection. Look for things like pus or irritated, swollen, red, or tender skin around the catheter. Be careful with your drainage bag · Always keep the drainage bag below the level of your bladder. This will help keep urine from flowing back into your bladder. · Check often to see that urine is flowing through the catheter into the drainage bag. · Empty the drainage bag when it is half full. This will keep it from overflowing or backing up. · When you empty the drainage bag, do not let the tubing or drain spout touch anything. Be careful with your catheter · Do not unhook the catheter from the drain tube. That could let germs get into the tube. · Make sure that the catheter tubing does not get twisted or kinked. · Do not tug or pull on the catheter. And make sure that the drainage bag does not drag or pull on the catheter. · Do not put powder or lotion on the skin around the catheter. · Do not have sexual intercourse while wearing a catheter. How do you empty a urine drainage bag? . 
If your doctor has asked you to keep a record, write down the amount of urine in the bag before you empty it. Wash your hands before and after you touch the bag. 1. Remove the drain spout from its sleeve at the bottom of the drainage bag. 
2. Open the valve on the drain spout.  Let the urine flow out into the toilet or a container. Be careful not to let the tubing or drain spout touch anything. 3. After you empty the bag, wipe off any liquid on the end of the drain spout. Close the valve. Then put the drain spout back into its sleeve at the bottom of the collection bag. How do you change from a bedside bag to a leg bag? Wash your hands before and after you handle the bags. 1. Empty the bag attached to the catheter. 2. Put a clean towel under the catheter where it connects to the bag. 
3. Fold and pinch the catheter closed to keep urine from leaking out. Many catheters have a clip you can use to pinch the tube closed. 4. Remove the used bag from the catheter. 5. Use an alcohol wipe to clean the tip of the leg bag. Then connect the leg bag to the catheter. 6. Strap the leg bag to your thigh or calf. Be sure the straps are not too tight. How can you clean a drainage bag? Clean your bags every day. Many people clean their bedside bag in the morning when they switch to a leg bag. At night, they attach the bedside bag and clean the leg bag. To clean a drainage ba. Remove the bag from the catheter. 2. Fill the bag with 2 parts vinegar and 3 parts water. Let it stand for 20 minutes. 3. Empty the bag, and let it air dry. When should you call for help? Call your doctor now or seek immediate medical care if: 
· You have symptoms of a urinary infection. These may include: 
¨ Pain or burning when you urinate. ¨ A frequent need to urinate without being able to pass much urine. ¨ Pain in the flank, which is just below the rib cage and above the waist on either side of the back. ¨ Blood in your urine. ¨ A fever. · Your urine smells bad. · You see large blood clots in your urine. · No urine or very little urine is flowing into the bag for 4 or more hours. Watch closely for changes in your health, and be sure to contact your doctor if: · The area around the catheter becomes irritated, swollen, red, or tender, or there is pus draining from it. · Urine is leaking from the place where the catheter enters your body. Follow-up care is a key part of your treatment and safety. Be sure to make and go to all appointments, and call your doctor if you are having problems. It's also a good idea to know your test results and keep a list of the medicines you take. Where can you learn more? Go to http://benson-heath.info/. Enter C910 in the search box to learn more about \"Learning About Urinary Catheter Care to Prevent Infection. \" Current as of: August 12, 2016 Content Version: 11.1 © 2142-9218 [employer]. Care instructions adapted under license by [employer] (which disclaims liability or warranty for this information). If you have questions about a medical condition or this instruction, always ask your healthcare professional. [facility] [address] any warranty or liability for your use of this information.

## 2017-01-29 NOTE — ED TRIAGE NOTES
Patient complains of pain in his penis and groin. States began at 1400. States he recently had prostate surgery. States he is only having blood come out and not urine.

## 2017-01-30 PROCEDURE — 74011250637 HC RX REV CODE- 250/637: Performed by: UROLOGY

## 2017-01-30 PROCEDURE — A9270 NON-COVERED ITEM OR SERVICE: HCPCS | Performed by: UROLOGY

## 2017-01-30 PROCEDURE — 74011250636 HC RX REV CODE- 250/636: Performed by: UROLOGY

## 2017-01-30 PROCEDURE — 74011000258 HC RX REV CODE- 258: Performed by: UROLOGY

## 2017-01-30 PROCEDURE — 74011636637 HC RX REV CODE- 636/637: Performed by: UROLOGY

## 2017-01-30 PROCEDURE — 77030018836 HC SOL IRR NACL ICUM -A

## 2017-01-30 PROCEDURE — G0378 HOSPITAL OBSERVATION PER HR: HCPCS

## 2017-01-30 PROCEDURE — 99218 HC RM OBSERVATION: CPT

## 2017-01-30 RX ORDER — DOCUSATE SODIUM 100 MG/1
100 CAPSULE, LIQUID FILLED ORAL 2 TIMES DAILY
Status: DISCONTINUED | OUTPATIENT
Start: 2017-01-30 | End: 2017-01-31 | Stop reason: HOSPADM

## 2017-01-30 RX ORDER — SULFAMETHOXAZOLE AND TRIMETHOPRIM 800; 160 MG/1; MG/1
1 TABLET ORAL EVERY 12 HOURS
Status: DISCONTINUED | OUTPATIENT
Start: 2017-01-30 | End: 2017-01-31 | Stop reason: HOSPADM

## 2017-01-30 RX ORDER — DEXTROSE, SODIUM CHLORIDE, AND POTASSIUM CHLORIDE 5; .45; .15 G/100ML; G/100ML; G/100ML
75 INJECTION INTRAVENOUS CONTINUOUS
Status: DISCONTINUED | OUTPATIENT
Start: 2017-01-30 | End: 2017-01-31 | Stop reason: HOSPADM

## 2017-01-30 RX ADMIN — DEXTROSE MONOHYDRATE, SODIUM CHLORIDE, AND POTASSIUM CHLORIDE 75 ML/HR: 50; 4.5; 1.49 INJECTION, SOLUTION INTRAVENOUS at 22:40

## 2017-01-30 RX ADMIN — DEXTROSE MONOHYDRATE, SODIUM CHLORIDE, AND POTASSIUM CHLORIDE 75 ML/HR: 50; 4.5; 1.49 INJECTION, SOLUTION INTRAVENOUS at 08:38

## 2017-01-30 RX ADMIN — FLECAINIDE ACETATE 50 MG: 100 TABLET ORAL at 17:18

## 2017-01-30 RX ADMIN — CEFAZOLIN SODIUM 1 G: 1 INJECTION, POWDER, FOR SOLUTION INTRAMUSCULAR; INTRAVENOUS at 12:03

## 2017-01-30 RX ADMIN — MESALAMINE 800 MG: 800 TABLET, DELAYED RELEASE ORAL at 22:06

## 2017-01-30 RX ADMIN — Medication 10 ML: at 22:00

## 2017-01-30 RX ADMIN — CEFAZOLIN SODIUM 1 G: 1 INJECTION, POWDER, FOR SOLUTION INTRAMUSCULAR; INTRAVENOUS at 04:49

## 2017-01-30 RX ADMIN — PREDNISONE 5 MG: 5 TABLET ORAL at 08:37

## 2017-01-30 RX ADMIN — PANTOPRAZOLE SODIUM 40 MG: 40 TABLET, DELAYED RELEASE ORAL at 05:24

## 2017-01-30 RX ADMIN — HYDROCHLOROTHIAZIDE: 25 TABLET ORAL at 22:01

## 2017-01-30 RX ADMIN — Medication 10 ML: at 05:19

## 2017-01-30 RX ADMIN — DILTIAZEM HYDROCHLORIDE 180 MG: 180 CAPSULE, COATED, EXTENDED RELEASE ORAL at 08:37

## 2017-01-30 RX ADMIN — DULOXETINE HYDROCHLORIDE 30 MG: 30 CAPSULE, DELAYED RELEASE ORAL at 22:07

## 2017-01-30 RX ADMIN — SULFAMETHOXAZOLE AND TRIMETHOPRIM 1 TABLET: 800; 160 TABLET ORAL at 21:59

## 2017-01-30 RX ADMIN — DOCUSATE SODIUM 100 MG: 100 CAPSULE ORAL at 12:03

## 2017-01-30 RX ADMIN — FLECAINIDE ACETATE 50 MG: 100 TABLET ORAL at 08:37

## 2017-01-30 RX ADMIN — MESALAMINE 800 MG: 800 TABLET, DELAYED RELEASE ORAL at 08:37

## 2017-01-30 RX ADMIN — PREDNISONE 5 MG: 5 TABLET ORAL at 22:07

## 2017-01-30 RX ADMIN — DOCUSATE SODIUM 100 MG: 100 CAPSULE ORAL at 17:18

## 2017-01-30 RX ADMIN — SULFAMETHOXAZOLE AND TRIMETHOPRIM 1 TABLET: 800; 160 TABLET ORAL at 08:41

## 2017-01-30 NOTE — PROGRESS NOTES
H and P dictated. S/p prostatectomy, danielle removed 1-24-17. Restarted Eliquis , did OK for several days but started to have gross hematuria today. Bladder distended to umbilicus. Placed 16 fr danielle, but can't irrigate. PE: CV RRR, tachy  Lungs: clear. Plan, cysto, evacuation of clots, danielle.

## 2017-01-30 NOTE — PROGRESS NOTES
TRANSFER - IN REPORT:    Verbal report received from Jeremiah Rahman RN(name) on Audrey Yepez  being received from [facility]) for routine progression of care      Report consisted of patients Situation, Background, Assessment and   Recommendations(SBAR). Information from the following report(s) SBAR was reviewed with the receiving nurse. Opportunity for questions and clarification was provided. Assessment completed upon patients arrival to unit and care assumed.

## 2017-01-30 NOTE — BRIEF OP NOTE
BRIEF OPERATIVE NOTE    Date of Procedure: 1/29/2017   Preoperative Diagnosis: hematuria with retention  Postoperative Diagnosis: hematuria with retention    Procedure(s):  CYSTOSCOPY, Evacuation clots  Surgeon(s) and Role:     * Vimal Grigsby MD - Primary            Surgical Staff:  Circ-1: Viky Link RN  Event Time In   Incision Start 2100   Incision Close 2115     Anesthesia: General   Estimated Blood Loss: 800-1000 ml clot in bladder  Specimens: * No specimens in log *   Findings: see op note   Complications: none  Implants: * No implants in log *

## 2017-01-30 NOTE — OP NOTES
[facility]   OPERATIVE REPORT       Name:  Rik Packer   MR#:  [id number]   :  1945   Account #:  [de-identified]   Date of Adm:  2017       DATE OF SURGERY: 2017    PREOPERATIVE DIAGNOSES: Gross hematuria and clot retention. POSTOPERATIVE DIAGNOSES: Gross hematuria and clot retention. PROCEDURE: Cystoscopy with evacuation of bladder clots. SURGEON: Ruben Carty. Susanne Sy MD    ANESTHESIA: General.    FINDINGS: Bladder full of clots, approximately 1 liter in   bladder, mild bleeding from the left side of the vesicourethral   anastomosis, but no active bleeding. DESCRIPTION OF PROCEDURE: The patient was given a general   anesthetic, placed in the dorsal lithotomy position. He was   prepped and draped in sterile fashion. A 22-Setswana cystoscope   was advanced into the urethra using the video camera and 30   degree lens. The scope was passed down to the bulbar urethra and   passed easily up into the bladder. The bladder was obscured by   what appeared to be a large amount of clot. I used a Magali   syringe to irrigate all of the clot out. There appeared to be at   least 1 liter of clot by gross examination. The bladder was inspected and appeared normal. Both ureteral   orifices were visible. The urethrovesical anastomosis was   visualized. There was some clot present on the left lateral   aspect of this, but overall the anastomosis appeared to be   intact. No evidence of any stricture and the scope had passed   easily into the bladder. The guidewire was passed through the scope into the bladder and   the scope removed. I cut the end of a 20-Setswana 3-way Coles off   and threated the catheter over that into the bladder. The   balloon was inflated with 10 mL and was connected to continuous   bladder irrigation. He was taken to the recovery room in stable   condition and will be kept for observation.         MD Rc Weaver / Yamilet Leon   D:  2017   21:28 T:  01/30/2017   11:40   Job #:  [id number]

## 2017-01-30 NOTE — ROUTINE PROCESS
TRANSFER - OUT REPORT:    Verbal report given to Lawyer De La Fuente on Lucio Escobar  being transferred to  for routine progression of care       Report consisted of patients Situation, Background, Assessment and   Recommendations(SBAR). Information from the following report(s) SBAR, Intake/Output and MAR was reviewed with the receiving nurse. Lines:   Peripheral IV 01/29/17 Left Forearm (Active)   Site Assessment Clean, dry, & intact 1/29/2017  9:55 PM   Phlebitis Assessment 0 1/29/2017  9:55 PM   Infiltration Assessment 0 1/29/2017  9:55 PM   Dressing Status Clean, dry, & intact 1/29/2017  9:55 PM   Dressing Type Tape;Transparent 1/29/2017  9:55 PM   Hub Color/Line Status Pink;Capped 1/29/2017  9:55 PM   Alcohol Cap Used No 1/29/2017  9:55 PM        Opportunity for questions and clarification was provided. Patient transported with:   Pt is on room air     VTE prophylaxis orders have been written for Lucio Escobar. Patient given room number and nurses name. Family updated re: pt status after security code verified.

## 2017-01-30 NOTE — PROGRESS NOTES
Doing well. No complaints. AVSS  Abd soft, NT, ND. Ports healing well. Labs reviewed. UOP pink on mild/mod CBI  Gross hematuria/clot retention on Eliquis. Cont CBI. Wean as tolerated.   Recheck labs in am.

## 2017-01-30 NOTE — PROGRESS NOTES
Pt walking halls independently with wife this shift. Tolerating diet. CBI to very slow gtt, no clots present, urine colorless. Pt with denies discomfort.

## 2017-01-30 NOTE — H&P
[facility] 66   HISTORY AND PHYSICAL       Name:  Izabella Feliciano   MR#:  [id number]   :  1945   Account #:  [de-identified]   Date of Adm:  2017       CHIEF COMPLAINT: Hematuria with inability to void. HISTORY OF ILLNESS: A 30-year-old man underwent a robotic-  assisted laparoscopic prostatectomy on 2017. Final path,   Rhianna 4+3 = 7, stage T2c prostate cancer with negative   margins. He was on Eliquis because of atrial fibrillation. This   was held for the surgery. A preoperative CT also showed a 5.2 cm   left lower pole renal mass with 7 mm left lower lobe pulmonary   nodule. He had restarted his Eliquis and when seen by Dr. George Larkin   2017, the catheter was removed on that date. The patient   called today stating that he has started to have some gross   hematuria. He was instructed to push p.o. fluids. Later in the   day, he called and his wife stated that he was having to void   about every 5 minutes and was very uncomfortable. The patient came to the ER. A PVR in the ER was 500 mL. The   staff attempted to place a 24-Citizen of the [place] Coles catheter and called   me and stated that it was not draining. PAST MEDICAL HISTORY: Includes     1. Atrial fibrillation. 2. Hypertension. 3. Hypercholesterolemia. 4. Ulcerative colitis. PAST SURGERY: Includes    1. Appendectomy. 2. Hernia repair. MEDICATIONS: He takes    1. Prednisone 5 mg daily. 2. Ditropan. 3. Diltiazem 180 mg at bedtime. 4. Asacol 800 mg.   5. Tambocor 50 mg b.i.d.   6. Hyzaar 1/2 to 1 tablet p.r.n. for blood pressure. 7. Cymbalta 30 mg at bedtime. 8. Eliquis 2.5 mg b.i.d.   9. Asacol 400 mg t.i.d.   10. Protonix 20 mg every day. 11. Lipitor 40 mg every morning. ALLERGIES: CODEINE MAKES HIM JITTERY. PHYSICAL EXAMINATION   GENERAL: This is an obese male in obvious distress. NECK: Supple. LUNGS: Clear. HEART: Tachycardic but regular rate and rhythm.    ABDOMEN: The bladder appears to be palpable up to the umbilicus. The patient had a 24-Persian 3-way Coles in place. I attempted to   hand irrigate it without any success getting anything to come   back. There is no drainage from the catheter. I deflated the   catheter and removed it. I attempted to replace it and met   resistance presumably at the anastomosis. My impression is that   the Coles was not in the bladder. I then passed a 16-Persian   Coles catheter, which appeared to go easily into the bladder. I   did get some bloody urine to drain and I could irrigate this by   pushing in but I was not able to aspirate this well at all. His   bladder remained distended. My impression is that he has clot   retention with clot remaining in the bladder. ASSESSMENT   1. Clot retention. 2. Status post recent robotic-assisted laparoscopic   prostatectomy. 3. Atrial fibrillation, recently started Eliquis gross   hematuria. RECOMMENDATIONS: Proceed to the OR for cystoscopy, evacuation of   clots, and Coles catheter placement. I have informed the family   that this may affect his continence either short-term or long-  term, but there are no other alternatives at this time.         MD Abiel Garcia / Virginia Acosta   D:  01/29/2017   19:46   T:  01/29/2017   23:33   Job #:  [id number]

## 2017-01-30 NOTE — ANESTHESIA POSTPROCEDURE EVALUATION
Post-Anesthesia Evaluation and Assessment    Patient: Carol Guallpa MRN: [id number]  SSN: [id number]    YOB: 1945  Age: 80 y.o. Sex: male       Cardiovascular Function/Vital Signs  Visit Vitals    BP (P) 135/66 (BP 1 Location: Right arm, BP Patient Position: At rest)    Pulse 96    Temp 36.6 °C (97.9 °F)    Resp 16    Ht 5' 10\" (1.778 m)    Wt 85.7 kg (189 lb)    SpO2 100%    BMI 27.12 kg/m2       Patient is status post general anesthesia for Procedure(s):  cystoscopy, evacuation of clots. Nausea/Vomiting: None    Postoperative hydration reviewed and adequate. Pain:  Pain Scale 1: (P) Numeric (0 - 10) (01/29/17 2134)  Pain Intensity 1: (P) 0 (01/29/17 2134)   Managed    Neurological Status:   Neuro (WDL): (P) Within Defined Limits (01/29/17 2134)  Neuro  LUE Motor Response: (P) Purposeful (01/29/17 2134)  LLE Motor Response: (P) Purposeful (01/29/17 2134)  RUE Motor Response: (P) Purposeful (01/29/17 2134)  RLE Motor Response: (P) Purposeful (01/29/17 2134)   At baseline    Mental Status and Level of Consciousness: Alert and oriented     Pulmonary Status:   O2 Device: Nasal cannula (01/29/17 2134)   Adequate oxygenation and airway patent    Complications related to anesthesia: None    Post-anesthesia assessment completed.  No concerns    Signed By: Lizandro Dickinson MD     January 29, 2017

## 2017-01-30 NOTE — ANESTHESIA PREPROCEDURE EVALUATION
Anesthetic History   No history of anesthetic complications            Review of Systems / Medical History  Patient summary reviewed, nursing notes reviewed and pertinent labs reviewed    Pulmonary          Undiagnosed apnea         Neuro/Psych              Cardiovascular    Hypertension: well controlled        Dysrhythmias : atrial fibrillation  Hyperlipidemia    Exercise tolerance: >4 METS     GI/Hepatic/Renal     GERD: well controlled    Renal disease: CRI      Comments: [facility] End[facility]/Other        Arthritis     Other Findings            Physical Exam    Airway  Mallampati: II  TM Distance: 4 - 6 cm  Neck ROM: normal range of motion   Mouth opening: Normal     Cardiovascular  Regular rate and rhythm,  S1 and S2 normal,  no murmur, click, rub, or gallop  Rhythm: regular           Dental    Dentition: Full upper dentures     Pulmonary  Breath sounds clear to auscultation               Abdominal  GI exam deferred       Other Findings            Anesthetic Plan    ASA: 2, emergent  Anesthesia type: general          Induction: Intravenous and RSI  Anesthetic plan and risks discussed with: Patient and Spouse

## 2017-01-30 NOTE — ED PROVIDER NOTES
Patient is a 80 y.o. male presenting with hematuria. The history is provided by the patient. Blood in Urine    This is a new problem. The problem has not changed since onset. The quality of the pain is described as aching. The pain is at a severity of 10/10. The pain is severe. There has been no fever. He is not sexually active. There is no history of pyelonephritis. Pertinent negatives include no chills, no nausea, no vomiting, no vaginal discharge and no penile discharge. The patient is not pregnant. He has tried nothing for the symptoms. His past medical history is significant for urological procedure and urinary catheter problem.         Past Medical History:   Diagnosis Date    Anxiety     Arthritis     Atrial fibrillation (HCC)      paroxysmal Afib, last bout 3/2016    BPH (benign prostatic hypertrophy)     Cancer (HCC)      prostate    Chronic kidney disease      renal insufficiency , also has renal mass    Chronic pain      joint    Elevated PSA     Erectile dysfunction     GERD (gastroesophageal reflux disease)      controlled with meds     Hypercholesteremia     Hypertension     Macular degeneration     Psychiatric disorder      anxiety    Spondylitis (HCC)      last infusion 9-2015    Ulcerative colitis ([address]     Ulcerative colitis ([address]     Vitamin D deficiency     Wears dentures      uppers        Past Surgical History:   Procedure Laterality Date    Hx colonoscopy      Hx appendectomy  age 30's   Miguel Pastor Hx orthopaedic Right      achilles tendon repair    Hx cataract removal Bilateral      IOL implants    Hx hernia repair Bilateral 2013    Hx lap cholecystectomy  2013    Hx hernia repair       umbilical         Family History:   Problem Relation Age of Onset    Heart Disease Father     Hypertension Father        Social History     Social History    Marital status:      Spouse name: N/A    Number of children: N/A    Years of education: N/A     Occupational History    Not on file. 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 ALLERGIES: Codeine    Review of Systems   Constitutional: Negative. Negative for chills. HENT: Negative. Respiratory: Negative. Cardiovascular: Negative. Gastrointestinal: Negative. Negative for nausea and vomiting. Endocrine: Negative. Genitourinary: Negative. Negative for penile discharge and vaginal discharge. Musculoskeletal: Negative. Skin: Negative. Neurological: Negative. Hematological: Negative. Vitals:    01/29/17 1723   BP: 130/83   Pulse: (!) 125   Resp: 20   Temp: 97.7 °F (36.5 °C)   SpO2: 99%   Weight: 85.7 kg (189 lb)   Height: 5' 10\" (1.778 m)            Physical Exam   Constitutional: He is oriented to person, place, and time. He appears well-developed and well-nourished. Non-toxic appearance. He appears distressed. HENT:   Head: Normocephalic and atraumatic. Cardiovascular: Intact distal pulses. Pulmonary/Chest: Effort normal.   Abdominal: Soft. Neurological: He is alert and oriented to person, place, and time. Skin: Skin is warm and dry. Psychiatric: He has a normal mood and affect. His behavior is normal.   Nursing note and vitals reviewed. MDM  Number of Diagnoses or Management Options  Urinary retention:   Diagnosis management comments: Patient is tender in uncomfortable around suprapubic region. Dr. Justin Dumont had contacted the ER prior to patient's arrival and requested catheter placement, irrigation, and bladder scan.   Despite these procedures, little urine output was achieved and patient still having waves of pain.  Bladder scan revealed approximately 500 mL's of urine. Catheter was placed with no difficulty and was actually irrigated without any resistance but did not return any urine or blood. Dr. Nehemias Stanton was contacted again and will come to the ER to evaluate personally and a urologic procedure cart was requested to be at the bedside. Dr. Nehemias Stanton tried at bedside and was unsuccessful. Calling OR and will take to do procedure tonight.        Amount and/or Complexity of Data Reviewed  Clinical lab tests: ordered and reviewed (Results for orders placed or performed during the hospital encounter of 01/29/17  -CBC WITH AUTOMATED DIFF       Result                                            Value                         Ref Range                       WBC                                               13.3 (H)                      4.3 - 11.1 K/uL                 RBC                                               4.07 (L)                      4.23 - 5.67 M/uL                HGB                                               12.2 (L)                      13.6 - 17.2 g/dL                HCT                                               36.5 (L)                      41.1 - 50.3 %                   MCV                                               89.7                          79.6 - 97.8 FL                  MCH                                               30.0                          26.1 - 32.9 PG                  MCHC                                              33.4                          31.4 - 35.0 g/dL                RDW                                               13.8                          11.9 - 14.6 %                   PLATELET                                          397                           150 - 450 K/uL                  MPV                                               10.3 (L)                      10.8 - 14.1 FL                  NEUTROPHILS                                       58 43 - 78 %                       LYMPHOCYTES                                       33                            13 - 44 %                       MONOCYTES                                         7                             4.0 - 12.0 %                    EOSINOPHILS                                       1                             0.5 - 7.8 %                     BASOPHILS                                         1                             0.0 - 2.0 %                     IMMATURE GRANULOCYTES                             0                             0.0 - 5.0 %                     ABS. NEUTROPHILS                                  7.8                           1.7 - 8.2 K/UL                  ABS. LYMPHOCYTES                                  4.4                           0.5 - 4.6 K/UL                  ABS. MONOCYTES                                    0.9                           0.1 - 1.3 K/UL                  ABS. EOSINOPHILS                                  0.1                           0.0 - 0.8 K/UL                  ABS. BASOPHILS                                    0.1                           0.0 - 0.2 K/UL                  ABS. IMM.  GRANS.                                  0.0                           0.0 - 0.5 K/UL                  RBC COMMENTS                                      NORMOCYTIC/NORMOCHROMIC                                       WBC COMMENTS                                      Result Confirmed By Smear                                     PLATELET COMMENTS                                 ADEQUATE                                                      DF                                                AUTOMATED                                                -METABOLIC PANEL, COMPREHENSIVE       Result                                            Value                         Ref Range                       Sodium                                            137                           136 - 145 mmol/L                Potassium                                         3.4 (L)                       3.5 - 5.1 mmol/L                Chloride                                          103                           98 - 107 mmol/L                 CO2                                               18 (L)                        21 - 32 mmol/L                  Anion gap                                         16                            7 - 16 mmol/L                   Glucose                                           156 (H)                       65 - 100 mg/dL                  BUN                                               39 (H)                        8 - 23 MG/DL                    Creatinine                                        2.27 (H)                      0.8 - 1.5 MG/DL                 GFR est AA                                        37 (L)                        >60 ml/min/1.73m2               GFR est non-AA                                    30 (L)                        >60 ml/min/1.73m2               Calcium                                           8.9                           8.3 - 10.4 MG/DL                Bilirubin, total                                  0.4                           0.2 - 1.1 MG/DL                 ALT                                               18                            12 - 65 U/L                     AST                                               16                            15 - 37 U/L                     Alk. phosphatase                                  79                            50 - 136 U/L                    Protein, total                                    7.4                           6.3 - 8.2 g/dL                  Albumin                                           3.6                           3.2 - 4.6 g/dL                  Globulin                                          3.8 (H)                       2.3 - 3.5 g/dL                  A-G Ratio 0.9 (L)                       1.2 - 3.5                  )      ED Course       Procedures

## 2017-01-30 NOTE — PROGRESS NOTES
Primary Nurse Fredis Armando and Robert Mcneill RN performed a dual skin assessment on this patient. No impairment noted. Incision on abd from past prosectomy. Maikel score is 23.

## 2017-01-31 VITALS
DIASTOLIC BLOOD PRESSURE: 91 MMHG | TEMPERATURE: 98.3 F | WEIGHT: 189 LBS | HEART RATE: 67 BPM | SYSTOLIC BLOOD PRESSURE: 139 MMHG | HEIGHT: 70 IN | OXYGEN SATURATION: 91 % | RESPIRATION RATE: 16 BRPM | BODY MASS INDEX: 27.06 KG/M2

## 2017-01-31 LAB
ANION GAP BLD CALC-SCNC: 9 MMOL/L (ref 7–16)
BUN SERPL-MCNC: 23 MG/DL (ref 8–23)
CALCIUM SERPL-MCNC: 8.3 MG/DL (ref 8.3–10.4)
CHLORIDE SERPL-SCNC: 107 MMOL/L (ref 98–107)
CO2 SERPL-SCNC: 26 MMOL/L (ref 21–32)
CREAT SERPL-MCNC: 1.49 MG/DL (ref 0.8–1.5)
GLUCOSE SERPL-MCNC: 107 MG/DL (ref 65–100)
HCT VFR BLD AUTO: 30.8 % (ref 41.1–50.3)
HGB BLD-MCNC: 10.1 G/DL (ref 13.6–17.2)
POTASSIUM SERPL-SCNC: 3.8 MMOL/L (ref 3.5–5.1)
SODIUM SERPL-SCNC: 142 MMOL/L (ref 136–145)

## 2017-01-31 PROCEDURE — 74011636637 HC RX REV CODE- 636/637: Performed by: UROLOGY

## 2017-01-31 PROCEDURE — 80048 BASIC METABOLIC PNL TOTAL CA: CPT | Performed by: UROLOGY

## 2017-01-31 PROCEDURE — 74011250637 HC RX REV CODE- 250/637: Performed by: UROLOGY

## 2017-01-31 PROCEDURE — G0378 HOSPITAL OBSERVATION PER HR: HCPCS

## 2017-01-31 PROCEDURE — 85018 HEMOGLOBIN: CPT | Performed by: UROLOGY

## 2017-01-31 PROCEDURE — A9270 NON-COVERED ITEM OR SERVICE: HCPCS | Performed by: UROLOGY

## 2017-01-31 PROCEDURE — 99218 HC RM OBSERVATION: CPT

## 2017-01-31 PROCEDURE — 36415 COLL VENOUS BLD VENIPUNCTURE: CPT | Performed by: UROLOGY

## 2017-01-31 RX ADMIN — DILTIAZEM HYDROCHLORIDE 180 MG: 180 CAPSULE, COATED, EXTENDED RELEASE ORAL at 08:17

## 2017-01-31 RX ADMIN — SULFAMETHOXAZOLE AND TRIMETHOPRIM 1 TABLET: 800; 160 TABLET ORAL at 08:15

## 2017-01-31 RX ADMIN — DOCUSATE SODIUM 100 MG: 100 CAPSULE ORAL at 08:15

## 2017-01-31 RX ADMIN — Medication 10 ML: at 06:00

## 2017-01-31 RX ADMIN — MESALAMINE 800 MG: 800 TABLET, DELAYED RELEASE ORAL at 08:17

## 2017-01-31 RX ADMIN — FLECAINIDE ACETATE 50 MG: 100 TABLET ORAL at 08:16

## 2017-01-31 RX ADMIN — PREDNISONE 5 MG: 5 TABLET ORAL at 08:14

## 2017-01-31 RX ADMIN — PANTOPRAZOLE SODIUM 40 MG: 40 TABLET, DELAYED RELEASE ORAL at 05:24

## 2017-01-31 NOTE — DISCHARGE INSTRUCTIONS
DISCHARGE SUMMARY from Nurse    The following personal items are in your possession at time of discharge:    Dental Appliances: Uppers  Visual Aid: None     Home Medications: Kept at bedside  Jewelry: None  Clothing: Pants, Shirt, Socks  Other Valuables: None  Personal Items Sent to Safe: none          PATIENT INSTRUCTIONS:    After general anesthesia or intravenous sedation, for 24 hours or while taking prescription Narcotics:  · Limit your activities  · Do not drive and operate hazardous machinery  · Do not make important personal or business decisions  · Do  not drink alcoholic beverages  · If you have not urinated within 8 hours after discharge, please contact your surgeon on call. Report the following to your surgeon:  · Excessive pain, swelling, redness or odor of or around the surgical area  · Temperature over 100.5  · Nausea and vomiting lasting longer than 4 hours or if unable to take medications  · Any signs of decreased circulation or nerve impairment to extremity: change in color, persistent  numbness, tingling, coldness or increase pain  · Any questions        What to do at Home:  Recommended activity: No lifting, Driving, or Strenuous exercise for 1 week. Use leg bag during the day and bedside bag at night. If you experience any of the following symptoms catheter not draining, fever greater than 101, nausea/vomiting, or pain, please follow up with Dr. Emili Andujar. *  Please give a list of your current medications to your Primary Care Provider. *  Please update this list whenever your medications are discontinued, doses are      changed, or new medications (including over-the-counter products) are added. *  Please carry medication information at all times in case of emergency situations.           These are general instructions for a healthy lifestyle:    No smoking/ No tobacco products/ Avoid exposure to second hand smoke    Surgeon General's Warning:  Quitting smoking now greatly reduces serious risk to your health. Obesity, smoking, and sedentary lifestyle greatly increases your risk for illness    A healthy diet, regular physical exercise & weight monitoring are important for maintaining a healthy lifestyle    You may be retaining fluid if you have a history of heart failure or if you experience any of the following symptoms:  Weight gain of 3 pounds or more overnight or 5 pounds in a week, increased swelling in our hands or feet or shortness of breath while lying flat in bed. Please call your doctor as soon as you notice any of these symptoms; do not wait until your next office visit. Recognize signs and symptoms of STROKE:    F-face looks uneven    A-arms unable to move or move unevenly    S-speech slurred or non-existent    T-time-call 911 as soon as signs and symptoms begin-DO NOT go       Back to bed or wait to see if you get better-TIME IS BRAIN. Warning Signs of HEART ATTACK     Call 911 if you have these symptoms:   Chest discomfort. Most heart attacks involve discomfort in the center of the chest that lasts more than a few minutes, or that goes away and comes back. It can feel like uncomfortable pressure, squeezing, fullness, or pain.  Discomfort in other areas of the upper body. Symptoms can include pain or discomfort in one or both arms, the back, neck, jaw, or stomach.  Shortness of breath with or without chest discomfort.  Other signs may include breaking out in a cold sweat, nausea, or lightheadedness. Don't wait more than five minutes to call 911 - MINUTES MATTER! Fast action can save your life. Calling 911 is almost always the fastest way to get lifesaving treatment. Emergency Medical Services staff can begin treatment when they arrive -- up to an hour sooner than if someone gets to the hospital by car. The discharge information has been reviewed with the patient. The patient verbalized understanding.     Discharge medications reviewed with the patient and appropriate educational materials and side effects teaching were provided.

## 2017-01-31 NOTE — PROGRESS NOTES
Discharge instructions and prescriptions given and reviewed with pt and wife, instructed on leg bag/bedsdie bag, verbalizes understanding, medication side effect sheet reviewed with pt, pt discharged home.

## 2017-01-31 NOTE — PROGRESS NOTES
Doing well. No complaints. AVSS  Labs pending. Abd soft, NT, ND  UOP light pink off CBI  S/P clot evac POD#2  Home with Coles.   Follow up on 2/6 for catheter removal.

## 2017-01-31 NOTE — PROGRESS NOTES
Care Management Interventions  PCP Verified by CM: Yes  Transition of Care Consult (CM Consult): Discharge Planning  Discharge Durable Medical Equipment: No  Physical Therapy Consult: No  Occupational Therapy Consult: No  Speech Therapy Consult: No  Current Support Network: Lives with Spouse  Confirm Follow Up Transport: Family  Plan discussed with Pt/Family/Caregiver: Yes  Discharge Location  Discharge Placement: Home with family assistance    SW dc screening. Pt is s/p cystoscopy with evacuation of bladder clots. Hx of prostate CA. Pt is alert and oriented in all spheres. Has macular degeneration but ambulates and performs ADLs independently. Spouse works during the day. She takes pt to MD appts, etc.  Pt has a supportive network of neighbors and family members who are able to assist as well. No DME. Has a PCP and rx plan. No anticipated dc needs. Obs letter given. Signed copy placed in chart.

## 2017-02-01 ENCOUNTER — HOSPITAL ENCOUNTER (EMERGENCY)
Age: 72
Discharge: HOME | End: 2017-02-01
Attending: EMERGENCY MEDICINE
Payer: MEDICARE

## 2017-02-01 VITALS
RESPIRATION RATE: 16 BRPM | SYSTOLIC BLOOD PRESSURE: 109 MMHG | DIASTOLIC BLOOD PRESSURE: 64 MMHG | HEIGHT: 70 IN | WEIGHT: 189 LBS | HEART RATE: 85 BPM | BODY MASS INDEX: 27.06 KG/M2 | TEMPERATURE: 98.4 F | OXYGEN SATURATION: 100 %

## 2017-02-01 DIAGNOSIS — T83.9XXA FOLEY CATHETER PROBLEM, INITIAL ENCOUNTER (HCC): Primary | ICD-10-CM

## 2017-02-01 PROCEDURE — 99283 EMERGENCY DEPT VISIT LOW MDM: CPT | Performed by: EMERGENCY MEDICINE

## 2017-02-02 NOTE — ED TRIAGE NOTES
Pt c/o danielle catheter not draining since 4pm today. States the catheter was placed on Sunday. Pt called Urology and Dr. Nithya Wong instructed pt to come to ER to have danielle irrigated. Pt denies any other complaints. Pt alert and oriented x 4. Respirations are even and unlabored. PT appears in no acute distress at this time.

## 2017-02-02 NOTE — ED NOTES
I have reviewed medications, follow up provider options, and discharge instructions with the patient. The patient verbalized understanding. Copy of discharge information given to patient upon discharge. Patient discharged in no distress. Patient ambulatory to waiting area. No questions at this time.

## 2017-02-02 NOTE — ED PROVIDER NOTES
HPI Comments: Patient with prostate surgery 2 weeks ago. Started bleeding a this Sunday and had a repeat surgery to clean out his bladder. Coles catheter placed at that time with no recurrence of bleeding that became obstructed and leaking urine around the catheter at the tip of the penis 4 hours ago. No abdominal pain. Patient is a 80 y.o. male presenting with urinary catheter problem. The history is provided by the patient. No  was used. Urinary Catheter Problem    This is a new problem. The current episode started 3 to 5 hours ago. The problem has not changed since onset. The quality of the pain is described as aching. The patient is experiencing no pain. There has been no fever. Pertinent negatives include no chills, no sweats, no nausea, no vomiting, no frequency, no hematuria, no flank pain, no abdominal pain and no back pain. He has tried nothing for the symptoms. His past medical history is significant for urinary catheter problem.         Past Medical History:   Diagnosis Date    Anxiety     Arthritis     Atrial fibrillation (HCC)      paroxysmal Afib, last bout 3/2016    BPH (benign prostatic hypertrophy)     Cancer (HCC)      prostate    Chronic kidney disease      renal insufficiency , also has renal mass    Chronic pain      joint    Elevated PSA     Erectile dysfunction     GERD (gastroesophageal reflux disease)      controlled with meds     Hypercholesteremia     Hypertension     Macular degeneration     Psychiatric disorder      anxiety    Spondylitis (HCC)      last infusion 9-2015    Ulcerative colitis ([address]     Ulcerative colitis ([address]     Vitamin D deficiency     Wears dentures      uppers        Past Surgical History:   Procedure Laterality Date    Hx colonoscopy      Hx appendectomy  age 30's   [facility] Hx orthopaedic Right      achilles tendon repair    Hx cataract removal Bilateral      IOL implants    Hx hernia repair Bilateral 2013    Hx lap cholecystectomy  2013    Hx hernia repair       umbilical         Family History:   Problem Relation Age of Onset    Heart Disease Father     Hypertension Father        Social History     Social History    Marital status:      Spouse name: N/A    Number of children: N/A    Years of education: N/A     Occupational History    Not on file. 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 ALLERGIES: Codeine    Review of Systems   Constitutional: Negative for chills and fever. HENT: Negative for rhinorrhea and sore throat. Eyes: Negative for pain and redness. Respiratory: Negative for chest tightness, shortness of breath and wheezing. Cardiovascular: Negative for chest pain and leg swelling. Gastrointestinal: Negative for abdominal pain, diarrhea, nausea and vomiting. Genitourinary: Positive for difficulty urinating. Negative for flank pain, frequency, hematuria, penile pain and penile swelling. Musculoskeletal: Negative for back pain, gait problem, neck pain and neck stiffness. Skin: Negative for color change and rash. Neurological: Negative for weakness, numbness and headaches. Vitals:    02/01/17 1947   BP: 109/64   Pulse: 85   Resp: 17   Temp: 98.4 °F (36.9 °C)   SpO2: 98%   Weight: 85.7 kg (189 lb)   Height: 5' 10\" (1.778 m)            Physical Exam   Constitutional: He is oriented to person, place, and time. He appears well-developed and well-nourished. HENT:   Head: Normocephalic and atraumatic. Neck: Normal range of motion. Neck supple. Cardiovascular: Normal rate and regular rhythm.     No murmur heard.  Pulmonary/Chest: Effort normal and breath sounds normal. He has no wheezes. Abdominal: Soft. Bowel sounds are normal. He exhibits no distension. There is no tenderness. Genitourinary: Penis normal.   Genitourinary Comments: Coles catheter in place. Musculoskeletal: Normal range of motion. He exhibits no edema. Neurological: He is alert and oriented to person, place, and time. Skin: Skin is warm and dry. Nursing note and vitals reviewed. MDM  Number of Diagnoses or Management Options  Diagnosis management comments: Catheter flushed and improved function with good UOP. Will discharge to Dr. Elke Gomez for follow up.      Patient Progress  Patient progress: stable    ED Course       Procedures

## 2017-02-02 NOTE — ED NOTES
Danielle catheter irrigated. Approx 4 very small clots removed.  Urine is flowing into danielle bag at present

## 2017-03-03 ENCOUNTER — HOSPITAL ENCOUNTER (OUTPATIENT)
Dept: PHYSICAL THERAPY | Age: 72
Discharge: HOME | End: 2017-03-03
Payer: MEDICARE

## 2017-03-03 PROCEDURE — G8987 SELF CARE CURRENT STATUS: HCPCS

## 2017-03-03 PROCEDURE — 97161 PT EVAL LOW COMPLEX 20 MIN: CPT

## 2017-03-03 PROCEDURE — G8988 SELF CARE GOAL STATUS: HCPCS

## 2017-03-03 PROCEDURE — G8989 SELF CARE D/C STATUS: HCPCS

## 2017-03-03 NOTE — PROGRESS NOTES
Almita Rosa  : 1945 [facility]  [address], [address] [address],[address], [address] [facility]  [phone]   [phone]          OUTPATIENT PHYSICAL THERAPY:Initial Assessment 3/3/2017    ICD-10: Treatment Diagnosis: Stress Incontinence (N39.3)  Precautions/Allergies:   Codeine   Fall Risk Score: 1 (? 5 = High Risk)  MD Orders: Evaluate and treat MEDICAL/REFERRING DIAGNOSIS:  Prostate cancer ([facility] Linus Blizzard   DATE OF ONSET: 17 (DOS)  REFERRING PHYSICIAN: Alessandro Moise DO  RETURN PHYSICIAN APPOINTMENT: unknown     INITIAL ASSESSMENT:  Mr. Arthur Flores presents with decreased strength and endurance of PFmm, as noted with biofeedback, likely contributing to his stress incontinence. He seems to be doing very well s/p RALP but will benefit from strength and endurance training as well as working on isolating PFmm. Pt has tendency to utilize gluts as accessory muscles. He was educated today on bladder irritants which also may be playing a role in his urinary incontinence. Plan of care was discussed and agreed upon with patient and HEP was initiated. Thank you for the opportunity to work with this patient. PROBLEM LIST (Impacting functional limitations):  1. Decreased [place] with Home Exercise Program  2. Decreased strength of pelvic floor which limits bladder control INTERVENTIONS PLANNED:  1. Neuromuscular re-education  2. Biofeedback as needed  3. HEP  4. Bladder retraining  5. Bladder education  6. Manual Therapy  7. Therapeutic Activites  8. Therapeutic Exercise/Strengthening     TREATMENT PLAN:  Effective Dates: 3/3/17 TO 17. Frequency/Duration: 1 time a week for 8 weeks  GOALS: (Goals have been discussed and agreed upon with patient.)  Short-Term Functional Goals: Time Frame: 2 weeks  1.  Patient will demonstrate independence with home exercise program.  2. Pt will demonstrate ability to consistently perform PFmm contractions without using accessory glute muscles. Discharge Goals: Time Frame: 8 weeks  1. Pt will score 1 (urinary symptoms) on NIH for overall functional improvement. 2. Pt will demonstrate ability to perform PFmm contraction holding for 10sec for increased strength and endurance to maintain urinary continence. 3. Pt will report decreased use of pads to <=1ppd for decreased social anxiety. Rehabilitation Potential For Stated Goals: Good  Regarding [place] therapy, I certify that the treatment plan above will be carried out by a therapist or under their direction. Thank you for this referral,  Norberto Saucedo, PT     Referring Physician Signature: Buck Rush DO              Date                    The information in this section was collected on 03/03/17  (except where otherwise noted). HISTORY:   History of Present Injury/Illness (Reason for Referral):  Laparoscopic adhesiolysis and robotic assisted laparoscopic radical prostatectomy and right pelvic lymph node dissection performed 1/12/17. Developed blood clots in bladder after sx requiring further sx and catheter. Reports leaking only with sneezing or coughing. Past Medical History/Comorbidities:   Mr. Radha Felix  has a past medical history of Anxiety; Arthritis; Atrial fibrillation ([address] BPH (benign prostatic hypertrophy); Cancer ([address] Chronic kidney disease; Chronic pain; Elevated PSA; Erectile dysfunction; GERD (gastroesophageal reflux disease); Hypercholesteremia; Hypertension; Macular degeneration; Psychiatric disorder; Spondylitis ([address] Ulcerative colitis ([address] Ulcerative colitis ([address] Vitamin D deficiency; and Wears dentures. He also has no past medical history of Adverse effect of anesthesia; Difficult intubation; Malignant hyperthermia due to anesthesia; Nausea & vomiting; or Pseudocholinesterase deficiency.   Mr. Radha Felix  has a past surgical history that includes colonoscopy; appendectomy (age 30's); orthopaedic (Right); cataract removal (Bilateral); hernia repair (Bilateral, 2013); lap cholecystectomy (2013); and hernia repair. Social History/Living Environment:     lives with wife  Prior Level of Function/Work/Activity:  Retired; likes to ride his recumbent bike  Previous Treatment Approaches:          None noted  Personal Factors:          Age:  80 y.o. Overall Behavior:  Pt very motivated to improve continence. Does not particularly seem to care about his nocturia. Current Medications:    Current Outpatient Prescriptions:     ELIQUIS 2.5 mg tablet, 2.5 mg., Disp: , Rfl: 3    oxybutynin (DITROPAN) 5 mg tablet, Take 1 Tab by mouth every six (6) hours as needed for Other (Bladder Spasm). , Disp: 20 Tab, Rfl: 0    HYDROcodone-acetaminophen (NORCO) 7.5-325 mg per tablet, Take 2 Tabs by mouth every four (4) hours as needed. Max Daily Amount: 12 Tabs., Disp: 25 Tab, Rfl: 0    predniSONE (DELTASONE) 5 mg tablet, Take 5 mg by mouth., Disp: , Rfl:     mesalamine DR (ASACOL HD) 800 mg DR tablet, Take 800 mg by mouth., Disp: , Rfl:     dilTIAZem XR (DILACOR XR) 180 mg XR capsule, Take 180 mg by mouth nightly., Disp: , Rfl:     flecainide (TAMBOCOR) 50 mg tablet, Take 50 mg by mouth two (2) times a day., Disp: , Rfl:     losartan-hydrochlorothiazide (HYZAAR) 100-25 mg per tablet, TAKE 1/2 TO 1 TABLET DAILY AS DIRECTED FOR HIGH BLOOD PRESSURE , hs, Disp: , Rfl:     HYDROcodone-acetaminophen (NORCO) 7.5-325 mg per tablet, TAKE ONE TABLET BY MOUTH ONE TIME DAILY AS NEEDED, Disp: , Rfl: 0    DULoxetine (CYMBALTA) 30 mg capsule, Take 30 mg by mouth nightly., Disp: , Rfl:     ergocalciferol (VITAMIN D2) 50,000 unit capsule, Take 50,000 Units by mouth every Monday., Disp: , Rfl:     VIT A,C & E/B3/B2/LUT/MIN/GLUT (EYE-EMILY EXTRA + LUTEIN PO), Take  by mouth. Stop seven days prior to surgery per anesthesia protocol., Disp: , Rfl:     pantoprazole (PROTONIX) 20 mg tablet, Take 20 mg by mouth every morning. Take day of surgery per anesthesia protocol.   Indications: GASTROESOPHAGEAL REFLUX, Disp: , Rfl:     mesalamine EC (ASACOL) 400 mg EC tablet, Take  by mouth three (3) times daily. Indications: ULCERATIVE COLITIS, Disp: , Rfl:     atorvastatin (LIPITOR) 40 mg tablet, Take 40 mg by mouth every morning. Take day of surgery per anesthesia protocol. Indications: HYPERCHOLESTEROLEMIA, Disp: , Rfl:     INFLIXIMAB (REMICADE IV), by IntraVENous route. Last infusion  12/11/2016, Disp: , Rfl:     predniSONE (DELTASONE) 5 mg tablet, Take 5 mg by mouth nightly. Indications: ULCERATIVE COLITIS, Disp: , Rfl:     tadalafil (CIALIS) 20 mg tablet, Take 1 Tab by mouth as needed. , Disp: 6 Tab, Rfl: 99   Hyzaar daily  Date Last Reviewed:  03/03/17     Incontinence History:  PROBLEM: YES/NO: COMMENTS:   Loss of urine with coughing YES    Loss of urine with lifting  YES    Loss of urine with exercise, running NO    Loss of urine with strong urge NO    Loss of urine with approaching the bathroom NO    Loss of urine with key in lock NO    Loss of urine as getting to toilet/removing clothing NO    Loss of urine when hearing running water NO    Have difficulty initiating a urine stream NO    Have difficulty stopping urine stream NO    Have to strain to empty bladder NO    Dribble urine when urinating NO    Dribble urine after emptying bladder NO    Experience pain with urination NO    Experience burning during urination NO    Have blood in urine NO      Voiding Patterns: Patient voids every 2 hours during the day and 3 times during the night. Patient reports that he empties bladder fully. Patient uses pads for bladder protection; he changes pads 1 times per day. Fluid Intake: Patient drinks 8-10 cups of fluid per day. (water, gatorade, orange juice)  He consumes 1 cups of caffeinated beverages per day. Patient does not limit fluid intake to control bladder. Bowel Habits: Patient demonstrates normal bowel habits.   Mobility / Self Care: independent  Personal / Social History:  · Sexually active? NO:   · Social activities restricted due to urinary incontinence? NO:       Number of Personal Factors/Comorbidities that affect the Plan of Care: 1-2: MODERATE COMPLEXITY   EXAMINATION:   External Observation:   · Voluntary Contraction: Present  · Voluntary Relaxation: present  · Involuntary Contraction: present  · Involuntary Relaxation: present  SEMG evaluation (Date 3/3/17):  ·  Resting Tone: 1uV  ·  Quality of Resting Tone: normal  ·  5 Second Hold: 3 uV  ·  10 Second Hold: unable to hold   ·  Quality of Recruitment: Good   ·  Quality of Relaxation: Good   ·  Quality of Holding: Poor   ·  Stability of Hold: Fair   ·  Stability of Rest: Good      Body Structures Involved:  1. Muscles  2. Ligaments Body Functions Affected:  1. Mental  2. Genitourinary  3. Reproductive  4. Neuromusculoskeletal  5. Movement Related  6. Skin Related Activities and Participation Affected:  1. Learning and Applying Knowledge  2. General Tasks and Demands  3. Mobility  4. Self Care  5. Interpersonal Interactions and Relationships  6. Community, Social and [place]   Number of elements that affect the Plan of Care: 3: MODERATE COMPLEXITY   CLINICAL PRESENTATION:   Presentation: Stable and uncomplicated: LOW COMPLEXITY   CLINICAL DECISION MAKING:   Outcome Measure: Tool Used: NIH - Chronic Prostatitis Symptom Index (NIH - CPSI for Males)   Score:  Initial: 0/2/1 Most Recent: X (Date: -- )   Interpretation of Score:  Pain:  Score 0 1-4 5-8 9-12 13-16 17-20 21   Modifier CH CI CJ CK CL CM CN     Urinary Symptoms:  Score 0 1 2-3 4-5 6-7 8-9 10   Modifier CH CI CJ CK CL CM CN     Quality of Life Index:  Score 0 1-2 3-4 5-7 8-9 10-11 12   Modifier CH CI CJ CK CL CM CN       ?  Self Care:     - CURRENT STATUS: CI - 1%-19% impaired, limited or restricted    - GOAL STATUS: CI - 1%-19% impaired, limited or restricted    - D/C STATUS:  ---------------To be determined---------------       Medical Necessity:   · Patient demonstrates good rehab potential due to higher previous functional level. Reason for Services/Other Comments:  · Patient continues to require skilled intervention due to ongoing goals noted above. Use of outcome tool(s) and clinical judgement create a POC that gives a: Clear prediction of patient's progress: LOW COMPLEXITY   TREATMENT:   (In addition to Assessment/Re-Assessment sessions the following treatments were rendered)  Pre-treatment Symptoms/Complaints:  Reports leaking with coughing and sneezing mostly but improved if he pre-contracts PFmm  Pain: Initial:   Pain Intensity 1: 0  Post Session:  0     THERAPEUTIC EXERCISE: (  minutes):  Exercises per grid below to improve strength and coordination. Required minimal verbal and tactile cues to promote proper body mechanics and promote proper body breathing techniques. Progressed resistance and repetitions as indicated. Date:   Date:   Date:     Activity/Exercise Parameters Parameters Parameters                                               Exercises:  Patient instructed in pelvic floor exercises listed below:  kegels 5 sec hold 10x TID  The following educational topics were reviewed with patient:  Bladder health, tips to control urge, bladder diary, pelvic floor anatomy, how foods affect bladder, bladder retraining. Treatment/Session Assessment:  Pt reported good understanding of plan of care as well as exercises. All questions were answered and pt was invited to call with any further questions or issues  · Response to Treatment:  good  · Compliance with Program/Exercises: Will assess as treatment progresses. · Recommendations/Intent for next treatment session: \"Next visit will focus on advancements to more challenging activities\".   Total Treatment Duration:  PT Patient Time In/Time Out  Time In: 1230  Time Out: Elisha [address]

## 2017-03-03 NOTE — PROGRESS NOTES
Ambulatory/Rehab Services H2 Model Falls Risk Assessment    Risk Factor Pts. ·   Confusion/Disorientation/Impulsivity  []    4 ·   Symptomatic Depression  []   2 ·   Altered Elimination  []   1 ·   Dizziness/Vertigo  []   1 ·   Gender (Male)  [x]   1 ·   Any administered antiepileptics (anticonvulsants):  []   2 ·   Any administered benzodiazepines:  []   1 ·   Visual Impairment (specify):  []   1 ·   Portable Oxygen Use  []   1 ·   Orthostatic ? BP  []   1 ·   History of Recent Falls (within 3 mos.)  []   5     Ability to Rise from Chair (choose one) Pts. ·   Ability to rise in a single movement  [x]   0 ·   Pushes up, successful in one attempt  []   1 ·   Multiple attempts, but successful  []   3 ·   Unable to rise without assistance  []   4   Total: (5 or greater = High Risk) 1     Falls Prevention Plan:   []                Physical Limitations to Exercise (specify):   []                Mobility Assistance Device (type):   []                Exercise/Equipment Adaptation (specify):    ©2010 [facility] [address] Patent #[id number].  Federal Law prohibits the replication, distribution or use without written permission from [facility]

## 2017-03-10 ENCOUNTER — APPOINTMENT (OUTPATIENT)
Dept: PHYSICAL THERAPY | Age: 72
End: 2017-03-10
Payer: MEDICARE

## 2017-03-17 ENCOUNTER — HOSPITAL ENCOUNTER (OUTPATIENT)
Dept: PHYSICAL THERAPY | Age: 72
Discharge: HOME | End: 2017-03-17
Payer: MEDICARE

## 2017-03-17 DIAGNOSIS — C61 MALIGNANT NEOPLASM OF PROSTATE (HCC): ICD-10-CM

## 2017-03-17 PROCEDURE — 97110 THERAPEUTIC EXERCISES: CPT

## 2017-03-17 NOTE — PROGRESS NOTES
Joe Smart  : 1945 [facility]  [address], [address] [address],[address], [address] [facility]  [phone]   [phone]          OUTPATIENT PHYSICAL THERAPY:Daily Note 3/17/2017    ICD-10: Treatment Diagnosis: Stress Incontinence (N39.3)  Precautions/Allergies:   Codeine   Fall Risk Score: 1 (? 5 = High Risk)  MD Orders: Evaluate and treat MEDICAL/REFERRING DIAGNOSIS:  Malignant neoplasm of prostate ([facility] [address] Parisa Vitale   DATE OF ONSET: 17 (DOS)  REFERRING PHYSICIAN: Live Chinchilla DO  RETURN PHYSICIAN APPOINTMENT: unknown     INITIAL ASSESSMENT:  Mr. Mario Lizama presents with decreased strength and endurance of PFmm, as noted with biofeedback, likely contributing to his stress incontinence. He seems to be doing very well s/p RALP but will benefit from strength and endurance training as well as working on isolating PFmm. Pt has tendency to utilize gluts as accessory muscles. He was educated today on bladder irritants which also may be playing a role in his urinary incontinence. Plan of care was discussed and agreed upon with patient and HEP was initiated. Thank you for the opportunity to work with this patient. PROBLEM LIST (Impacting functional limitations):  1. Decreased [place] with Home Exercise Program  2. Decreased strength of pelvic floor which limits bladder control INTERVENTIONS PLANNED:  1. Neuromuscular re-education  2. Biofeedback as needed  3. HEP  4. Bladder retraining  5. Bladder education  6. Manual Therapy  7. Therapeutic Activites  8. Therapeutic Exercise/Strengthening     TREATMENT PLAN:  Effective Dates: 3/3/17 TO 17. Frequency/Duration: 1 time a week for 8 weeks  GOALS: (Goals have been discussed and agreed upon with patient.)  Short-Term Functional Goals: Time Frame: 2 weeks  1.  Patient will demonstrate independence with home exercise program.  2. Pt will demonstrate ability to consistently perform PFmm contractions without using accessory glute muscles. Discharge Goals: Time Frame: 8 weeks  1. Pt will score 1 (urinary symptoms) on NIH for overall functional improvement. 2. Pt will demonstrate ability to perform PFmm contraction holding for 10sec for increased strength and endurance to maintain urinary continence. 3. Pt will report decreased use of pads to <=1ppd for decreased social anxiety. Rehabilitation Potential For Stated Goals: Good  Regarding Sahra Brown therapy, I certify that the treatment plan above will be carried out by a therapist or under their direction. Thank you for this referral,  Grover Ibanez, PT     Referring Physician Signature: Jody Vela DO              Date                    The information in this section was collected on 03/17/17  (except where otherwise noted). HISTORY:   History of Present Injury/Illness (Reason for Referral):  Laparoscopic adhesiolysis and robotic assisted laparoscopic radical prostatectomy and right pelvic lymph node dissection performed 1/12/17. Developed blood clots in bladder after sx requiring further sx and catheter. Reports leaking only with sneezing or coughing. Past Medical History/Comorbidities:   Mr. Debra Joyner  has a past medical history of Anxiety; Arthritis; Atrial fibrillation ([address] BPH (benign prostatic hypertrophy); Cancer ([address] Chronic kidney disease; Chronic pain; Elevated PSA; Erectile dysfunction; GERD (gastroesophageal reflux disease); Hypercholesteremia; Hypertension; Macular degeneration; Psychiatric disorder; Spondylitis ([address] Ulcerative colitis ([address] Ulcerative colitis ([address] Vitamin D deficiency; and Wears dentures. He also has no past medical history of Adverse effect of anesthesia; Difficult intubation; Malignant hyperthermia due to anesthesia; Nausea & vomiting; or Pseudocholinesterase deficiency.   Mr. Debra Joyner  has a past surgical history that includes colonoscopy; appendectomy (age 30's); orthopaedic (Right); cataract removal (Bilateral); hernia repair (Bilateral, 2013); lap cholecystectomy (2013); and hernia repair. Social History/Living Environment:     lives with wife  Prior Level of Function/Work/Activity:  Retired; likes to ride his recumbent bike  Previous Treatment Approaches:          None noted  Personal Factors:          Age:  80 y.o. Overall Behavior:  Pt very motivated to improve continence. Does not particularly seem to care about his nocturia. Current Medications:    Current Outpatient Prescriptions:     aspirin delayed-release 81 mg tablet, Take  by mouth daily. , Disp: , Rfl:     HYDROcodone-acetaminophen (NORCO) 7.5-325 mg per tablet, Take 2 Tabs by mouth every four (4) hours as needed. Max Daily Amount: 12 Tabs., Disp: 25 Tab, Rfl: 0    predniSONE (DELTASONE) 5 mg tablet, Take 5 mg by mouth., Disp: , Rfl:     mesalamine DR (ASACOL HD) 800 mg DR tablet, Take 800 mg by mouth., Disp: , Rfl:     dilTIAZem XR (DILACOR XR) 180 mg XR capsule, Take 180 mg by mouth nightly., Disp: , Rfl:     flecainide (TAMBOCOR) 50 mg tablet, Take 50 mg by mouth two (2) times a day., Disp: , Rfl:     losartan-hydrochlorothiazide (HYZAAR) 100-25 mg per tablet, TAKE 1/2 TO 1 TABLET DAILY AS DIRECTED FOR HIGH BLOOD PRESSURE , hs, Disp: , Rfl:     HYDROcodone-acetaminophen (NORCO) 7.5-325 mg per tablet, TAKE ONE TABLET BY MOUTH ONE TIME DAILY AS NEEDED, Disp: , Rfl: 0    DULoxetine (CYMBALTA) 30 mg capsule, Take 30 mg by mouth nightly., Disp: , Rfl:     ergocalciferol (VITAMIN D2) 50,000 unit capsule, Take 50,000 Units by mouth every Monday., Disp: , Rfl:     VIT A,C & E/B3/B2/LUT/MIN/GLUT (EYE-EMILY EXTRA + LUTEIN PO), Take  by mouth. Stop seven days prior to surgery per anesthesia protocol., Disp: , Rfl:     pantoprazole (PROTONIX) 20 mg tablet, Take 20 mg by mouth every morning. Take day of surgery per anesthesia protocol.   Indications: GASTROESOPHAGEAL REFLUX, Disp: , Rfl:     mesalamine EC (ASACOL) 400 mg EC tablet, Take  by mouth three (3) times daily. Indications: ULCERATIVE COLITIS, Disp: , Rfl:     atorvastatin (LIPITOR) 40 mg tablet, Take 40 mg by mouth every morning. Take day of surgery per anesthesia protocol. Indications: HYPERCHOLESTEROLEMIA, Disp: , Rfl:     INFLIXIMAB (REMICADE IV), by IntraVENous route. Last infusion  12/11/2016, Disp: , Rfl:     predniSONE (DELTASONE) 5 mg tablet, Take 5 mg by mouth nightly. Indications: ULCERATIVE COLITIS, Disp: , Rfl:     tadalafil (CIALIS) 20 mg tablet, Take 1 Tab by mouth as needed. , Disp: 6 Tab, Rfl: 99   Hyzaar daily  Date Last Reviewed:  03/17/17     Incontinence History:  PROBLEM: YES/NO: COMMENTS:   Loss of urine with coughing YES    Loss of urine with lifting  YES    Loss of urine with exercise, running NO    Loss of urine with strong urge NO    Loss of urine with approaching the bathroom NO    Loss of urine with key in lock NO    Loss of urine as getting to toilet/removing clothing NO    Loss of urine when hearing running water NO    Have difficulty initiating a urine stream NO    Have difficulty stopping urine stream NO    Have to strain to empty bladder NO    Dribble urine when urinating NO    Dribble urine after emptying bladder NO    Experience pain with urination NO    Experience burning during urination NO    Have blood in urine NO      Voiding Patterns: Patient voids every 2 hours during the day and 3 times during the night. Patient reports that he empties bladder fully. Patient uses pads for bladder protection; he changes pads 1 times per day. Fluid Intake: Patient drinks 8-10 cups of fluid per day. (water, gatorade, orange juice)  He consumes 1 cups of caffeinated beverages per day. Patient does not limit fluid intake to control bladder. Bowel Habits: Patient demonstrates normal bowel habits. Mobility / Self Care: independent  Personal / Social History:  · Sexually active? NO:   · Social activities restricted due to urinary incontinence?  NO:       Number of Personal Factors/Comorbidities that affect the Plan of Care: 1-2: MODERATE COMPLEXITY   EXAMINATION:   External Observation:   · Voluntary Contraction: Present  · Voluntary Relaxation: present  · Involuntary Contraction: present  · Involuntary Relaxation: present  SEMG evaluation (Date 3/3/17):  ·  Resting Tone: 1uV  ·  Quality of Resting Tone: normal  ·  5 Second Hold: 3 uV  ·  10 Second Hold: unable to hold   ·  Quality of Recruitment: Good   ·  Quality of Relaxation: Good   ·  Quality of Holding: Poor   ·  Stability of Hold: Fair   ·  Stability of Rest: Good      Body Structures Involved:  1. Muscles  2. Ligaments Body Functions Affected:  1. Mental  2. Genitourinary  3. Reproductive  4. Neuromusculoskeletal  5. Movement Related  6. Skin Related Activities and Participation Affected:  1. Learning and Applying Knowledge  2. General Tasks and Demands  3. Mobility  4. Self Care  5. Interpersonal Interactions and Relationships  6. Community, Social and [place]   Number of elements that affect the Plan of Care: 3: MODERATE COMPLEXITY   CLINICAL PRESENTATION:   Presentation: Stable and uncomplicated: LOW COMPLEXITY   CLINICAL DECISION MAKING:   Outcome Measure: Tool Used: NIH - Chronic Prostatitis Symptom Index (NIH - CPSI for Males)   Score:  Initial: 0/2/1 Most Recent: X (Date: -- )   Interpretation of Score:  Pain:  Score 0 1-4 5-8 9-12 13-16 17-20 21   Modifier CH CI CJ CK CL CM CN     Urinary Symptoms:  Score 0 1 2-3 4-5 6-7 8-9 10   Modifier CH CI CJ CK CL CM CN     Quality of Life Index:  Score 0 1-2 3-4 5-7 8-9 10-11 12   Modifier CH CI CJ CK CL CM CN       ? Self Care:     - CURRENT STATUS: CI - 1%-19% impaired, limited or restricted    - GOAL STATUS: CI - 1%-19% impaired, limited or restricted    - D/C STATUS:  ---------------To be determined---------------       Medical Necessity:   · Patient demonstrates good rehab potential due to higher previous functional level.   Reason for Services/Other Comments:  · Patient continues to require skilled intervention due to ongoing goals noted above. Use of outcome tool(s) and clinical judgement create a POC that gives a: Clear prediction of patient's progress: LOW COMPLEXITY   TREATMENT:   (In addition to Assessment/Re-Assessment sessions the following treatments were rendered)  Pre-treatment Symptoms/Complaints:  States he is doing well with ex's. No reports of leakage lately. States he does not wear pad when at home but does sometimes when out and about [facility]. Pain: Initial:   Pain Intensity 1: 0  Post Session:  0     THERAPEUTIC EXERCISE: (55  minutes):  Exercises per grid below to improve strength and coordination. Required minimal verbal and tactile cues to promote proper body mechanics and promote proper body breathing techniques. Progressed resistance and repetitions as indicated. Date:  3/17/17 Date:   Date:     Activity/Exercise Parameters Parameters Parameters   Isometric PFmm See below     Bridge with ball squeeze 10x     Bridge with isometric ER GTB 10x     Clams with TB 2x10 GTB     sidestepping GTB 1 lap     Monster walks      squats GTB 10x       Exercises:    Biofeedback utilized with isometric PFmm 10/10, 2/4, elevator exercise  Patient instructed in pelvic floor exercises listed below:  kegels 5 sec hold 10x TID  3/17/17 - discussed gradually increasing to 10 sec hold 10reps TID  The following educational topics were reviewed with patient:  Bladder health, tips to control urge, bladder diary, pelvic floor anatomy, how foods affect bladder, bladder retraining. Treatment/Session Assessment: Tolerated all ex's well without c/o. Good response to elevator cues in biofeedback. Continue with decreased endurance noted with biofeedback. Issued GTB and ex's for HEP. Pt cancelled next appt d/t preop also scheduled. Pt to call to discuss PT follow up.   If continuing to do well on own then D/C PT.  · Response to Treatment: good  · Compliance with Program/Exercises: Will assess as treatment progresses. · Recommendations/Intent for next treatment session: \"Next visit will focus on advancements to more challenging activities\".   Total Treatment Duration:  PT Patient Time In/Time Out  Time In: 1230  Time Out: Elisha Stroud [address]

## 2017-03-22 ENCOUNTER — HOSPITAL ENCOUNTER (OUTPATIENT)
Dept: SURGERY | Age: 72
Discharge: HOME | End: 2017-03-22
Payer: MEDICARE

## 2017-03-22 VITALS
HEART RATE: 76 BPM | WEIGHT: 197.44 LBS | BODY MASS INDEX: 28.27 KG/M2 | TEMPERATURE: 98.3 F | SYSTOLIC BLOOD PRESSURE: 147 MMHG | RESPIRATION RATE: 16 BRPM | OXYGEN SATURATION: 95 % | DIASTOLIC BLOOD PRESSURE: 86 MMHG | HEIGHT: 70 IN

## 2017-03-22 LAB
ANION GAP BLD CALC-SCNC: 13 MMOL/L (ref 7–16)
APPEARANCE UR: CLEAR
ATRIAL RATE: 70 BPM
BACTERIA URNS QL MICRO: 0 /HPF
BILIRUB UR QL: NEGATIVE
BUN SERPL-MCNC: 30 MG/DL (ref 8–23)
CALCIUM SERPL-MCNC: 8.8 MG/DL (ref 8.3–10.4)
CALCULATED P AXIS, ECG09: 41 DEGREES
CALCULATED R AXIS, ECG10: 33 DEGREES
CALCULATED T AXIS, ECG11: 52 DEGREES
CASTS URNS QL MICRO: ABNORMAL /LPF
CHLORIDE SERPL-SCNC: 105 MMOL/L (ref 98–107)
CO2 SERPL-SCNC: 24 MMOL/L (ref 21–32)
COLOR UR: YELLOW
CREAT SERPL-MCNC: 1.8 MG/DL (ref 0.8–1.5)
DIAGNOSIS, 93000: NORMAL
EPI CELLS #/AREA URNS HPF: ABNORMAL /HPF
ERYTHROCYTE [DISTWIDTH] IN BLOOD BY AUTOMATED COUNT: 14.3 % (ref 11.9–14.6)
GLUCOSE SERPL-MCNC: 107 MG/DL (ref 65–100)
GLUCOSE UR STRIP.AUTO-MCNC: NEGATIVE MG/DL
HCT VFR BLD AUTO: 33.3 % (ref 41.1–50.3)
HGB BLD-MCNC: 10.4 G/DL (ref 13.6–17.2)
HGB UR QL STRIP: NEGATIVE
KETONES UR QL STRIP.AUTO: NEGATIVE MG/DL
LEUKOCYTE ESTERASE UR QL STRIP.AUTO: ABNORMAL
MCH RBC QN AUTO: 26.9 PG (ref 26.1–32.9)
MCHC RBC AUTO-ENTMCNC: 31.2 G/DL (ref 31.4–35)
MCV RBC AUTO: 86.3 FL (ref 79.6–97.8)
NITRITE UR QL STRIP.AUTO: NEGATIVE
P-R INTERVAL, ECG05: 192 MS
PH UR STRIP: 5.5 [PH] (ref 5–9)
PLATELET # BLD AUTO: 295 K/UL (ref 150–450)
PMV BLD AUTO: 10.3 FL (ref 10.8–14.1)
POTASSIUM SERPL-SCNC: 4 MMOL/L (ref 3.5–5.1)
PROT UR STRIP-MCNC: ABNORMAL MG/DL
Q-T INTERVAL, ECG07: 396 MS
QRS DURATION, ECG06: 80 MS
QTC CALCULATION (BEZET), ECG08: 427 MS
RBC # BLD AUTO: 3.86 M/UL (ref 4.23–5.67)
RBC #/AREA URNS HPF: ABNORMAL /HPF
SODIUM SERPL-SCNC: 142 MMOL/L (ref 136–145)
SP GR UR REFRACTOMETRY: 1.01 (ref 1–1.02)
UROBILINOGEN UR QL STRIP.AUTO: 0.2 EU/DL (ref 0.2–1)
VENTRICULAR RATE, ECG03: 70 BPM
WBC # BLD AUTO: 8.6 K/UL (ref 4.3–11.1)
WBC URNS QL MICRO: ABNORMAL /HPF

## 2017-03-22 PROCEDURE — 85027 COMPLETE CBC AUTOMATED: CPT | Performed by: UROLOGY

## 2017-03-22 PROCEDURE — 87086 URINE CULTURE/COLONY COUNT: CPT | Performed by: UROLOGY

## 2017-03-22 PROCEDURE — 93005 ELECTROCARDIOGRAM TRACING: CPT | Performed by: ANESTHESIOLOGY

## 2017-03-22 PROCEDURE — 81001 URINALYSIS AUTO W/SCOPE: CPT | Performed by: UROLOGY

## 2017-03-22 PROCEDURE — 80048 BASIC METABOLIC PNL TOTAL CA: CPT | Performed by: UROLOGY

## 2017-03-22 RX ORDER — SODIUM CHLORIDE 9 MG/ML
250 INJECTION, SOLUTION INTRAVENOUS AS NEEDED
Status: CANCELLED | OUTPATIENT
Start: 2017-03-22

## 2017-03-22 NOTE — PERIOP NOTES
Patient verified name, , and surgery as listed in [facility]. TYPE  CASE:111  Orders per surgeon: were Received  Labs per surgeon:cbc,bmp, ua collected and results are in [facility] urine culture remains pending:   Labs per anesthesia protocol: no additional   EKG  :  EKG completed and on chart, EKG from 2016 placed on chart, last cardiac note from 10/2016 placed on chart. Dr Hanna Sessions notified of surgery      Patient provided with handouts including guide to surgery , transfusions, pain management and hand hygiene for the family and community. Pt verbalizes understanding of all pre-op instructions . Instructed that family must be present in building at all times. Hibiclens and instructions given per hospital policy. Instructed patient to continue  previous medications as prescribed prior to surgery and  to take the following medications the day of surgery according to anesthesia guidelines : atorvastatin, Flecainide, prednisone, hydrocodone and protonix       Original medication prescription bottles were not visualized during patient appointment. Continue all previous medications unless otherwise directed. Instructed patient to hold  the following medications prior to surgery: all vitamins,supplements 7 days prior to surgery and all nsaids 5 days prior to surgery    Order placed on chart to hold Aspirin for 5 days prior to surgery from cardiologist    Instructed patient to come into hospital main entrance admission office to check in, in order to have Type and Cross completed on 2017 and also not removed green bracelet after it is placed on throughout hospital stay. Patient and spouse verbalized understanding      Patient verbalized understanding of all instructions and provided all medical/health information to the best of their ability.

## 2017-03-23 ENCOUNTER — APPOINTMENT (OUTPATIENT)
Dept: PHYSICAL THERAPY | Age: 72
End: 2017-03-23
Payer: MEDICARE

## 2017-03-24 LAB
BACTERIA SPEC CULT: NORMAL
SERVICE CMNT-IMP: NORMAL

## 2017-03-26 ENCOUNTER — ANESTHESIA EVENT (OUTPATIENT)
Dept: SURGERY | Age: 72
DRG: 660 | End: 2017-03-26
Payer: MEDICARE

## 2017-03-26 ENCOUNTER — HOSPITAL ENCOUNTER (OUTPATIENT)
Dept: LAB | Age: 72
Discharge: HOME | DRG: 660 | End: 2017-03-26
Payer: MEDICARE

## 2017-03-27 ENCOUNTER — ANESTHESIA (OUTPATIENT)
Dept: SURGERY | Age: 72
DRG: 660 | End: 2017-03-27
Payer: MEDICARE

## 2017-03-27 ENCOUNTER — HOSPITAL ENCOUNTER (INPATIENT)
Age: 72
LOS: 3 days | Discharge: HOME | DRG: 660 | End: 2017-03-30
Attending: UROLOGY | Admitting: UROLOGY
Payer: MEDICARE

## 2017-03-27 PROBLEM — N28.89 RENAL MASS: Status: ACTIVE | Noted: 2017-03-27

## 2017-03-27 LAB
HCT VFR BLD AUTO: 29.7 % (ref 41.1–50.3)
HGB BLD-MCNC: 9.6 G/DL (ref 13.6–17.2)

## 2017-03-27 PROCEDURE — 77030009403 HC ELECTRD ENDO MEGA -B: Performed by: UROLOGY

## 2017-03-27 PROCEDURE — 77030019908 HC STETH ESOPH SIMS -A: Performed by: ANESTHESIOLOGY

## 2017-03-27 PROCEDURE — 77030022473 HC HNDL ENDO GIA UNIV USDA -C: Performed by: UROLOGY

## 2017-03-27 PROCEDURE — 77030008477 HC STYL SATN SLP COVD -A: Performed by: ANESTHESIOLOGY

## 2017-03-27 PROCEDURE — 0TT10ZZ RESECTION OF LEFT KIDNEY, OPEN APPROACH: ICD-10-PCS | Performed by: UROLOGY

## 2017-03-27 PROCEDURE — 77030018390 HC SPNG HEMSTAT2 J&J -B: Performed by: UROLOGY

## 2017-03-27 PROCEDURE — 77030032490 HC SLV COMPR SCD KNE COVD -B: Performed by: UROLOGY

## 2017-03-27 PROCEDURE — 82355 CALCULUS ANALYSIS QUAL: CPT | Performed by: UROLOGY

## 2017-03-27 PROCEDURE — 77010033678 HC OXYGEN DAILY

## 2017-03-27 PROCEDURE — 88307 TISSUE EXAM BY PATHOLOGIST: CPT | Performed by: UROLOGY

## 2017-03-27 PROCEDURE — 74011250636 HC RX REV CODE- 250/636: Performed by: ANESTHESIOLOGY

## 2017-03-27 PROCEDURE — 74011000250 HC RX REV CODE- 250: Performed by: UROLOGY

## 2017-03-27 PROCEDURE — 74011250636 HC RX REV CODE- 250/636

## 2017-03-27 PROCEDURE — C1769 GUIDE WIRE: HCPCS | Performed by: UROLOGY

## 2017-03-27 PROCEDURE — 77030018673: Performed by: UROLOGY

## 2017-03-27 PROCEDURE — 65270000029 HC RM PRIVATE

## 2017-03-27 PROCEDURE — 77030016151 HC PROTCTR LNS DFOG COVD -B: Performed by: UROLOGY

## 2017-03-27 PROCEDURE — 94760 N-INVAS EAR/PLS OXIMETRY 1: CPT

## 2017-03-27 PROCEDURE — 76010000175 HC OR TIME 4 TO 4.5 HR INTENSV-TIER 1: Performed by: UROLOGY

## 2017-03-27 PROCEDURE — 77030002966 HC SUT PDS J&J -A: Performed by: UROLOGY

## 2017-03-27 PROCEDURE — 77030020407 HC IV BLD WRMR ST 3M -A: Performed by: ANESTHESIOLOGY

## 2017-03-27 PROCEDURE — 0T9B80Z DRAINAGE OF BLADDER WITH DRAINAGE DEVICE, VIA NATURAL OR ARTIFICIAL OPENING ENDOSCOPIC: ICD-10-PCS | Performed by: UROLOGY

## 2017-03-27 PROCEDURE — 77030000038 HC TIP SCIS LAPSCP SURI -B: Performed by: UROLOGY

## 2017-03-27 PROCEDURE — 77030020782 HC GWN BAIR PAWS FLX 3M -B: Performed by: ANESTHESIOLOGY

## 2017-03-27 PROCEDURE — 85018 HEMOGLOBIN: CPT | Performed by: UROLOGY

## 2017-03-27 PROCEDURE — 77030019940 HC BLNKT HYPOTHRM STRY -B: Performed by: ANESTHESIOLOGY

## 2017-03-27 PROCEDURE — 76060000039 HC ANESTHESIA 4 TO 4.5 HR: Performed by: UROLOGY

## 2017-03-27 PROCEDURE — 77030008756 HC TU IRR SUC STRY -B: Performed by: UROLOGY

## 2017-03-27 PROCEDURE — 74011250637 HC RX REV CODE- 250/637: Performed by: UROLOGY

## 2017-03-27 PROCEDURE — 77030034849: Performed by: UROLOGY

## 2017-03-27 PROCEDURE — 74011000250 HC RX REV CODE- 250

## 2017-03-27 PROCEDURE — 77030035045 HC TRCR ENDOSC VRSPRT BLDLSS COVD -B: Performed by: UROLOGY

## 2017-03-27 PROCEDURE — 77030021668 HC NEB PREFIL KT VYRM -A

## 2017-03-27 PROCEDURE — 77030005521 HC CATH URETH FOL38 BARD -B: Performed by: UROLOGY

## 2017-03-27 PROCEDURE — 77030011640 HC PAD GRND REM COVD -A: Performed by: UROLOGY

## 2017-03-27 PROCEDURE — 77030007956 HC PCH ENDOSC SPEC COVD -C: Performed by: UROLOGY

## 2017-03-27 PROCEDURE — 77030019927 HC TBNG IRR CYSTO BAXT -A: Performed by: UROLOGY

## 2017-03-27 PROCEDURE — 77030031139 HC SUT VCRL2 J&J -A: Performed by: UROLOGY

## 2017-03-27 PROCEDURE — 77030008467 HC STPLR SKN COVD -B: Performed by: UROLOGY

## 2017-03-27 PROCEDURE — 77030027138 HC INCENT SPIROMETER -A

## 2017-03-27 PROCEDURE — 77030009527 HC GEL PRT SYS AMR -E: Performed by: UROLOGY

## 2017-03-27 PROCEDURE — 74011000258 HC RX REV CODE- 258: Performed by: UROLOGY

## 2017-03-27 PROCEDURE — 77030022474 HC RELD STPLR ENDO GIA COVD -C: Performed by: UROLOGY

## 2017-03-27 PROCEDURE — 74011250636 HC RX REV CODE- 250/636: Performed by: UROLOGY

## 2017-03-27 PROCEDURE — 77030008703 HC TU ET UNCUF COVD -A: Performed by: ANESTHESIOLOGY

## 2017-03-27 PROCEDURE — 77030002896 HC SUT CLP ABSRB J&J -B: Performed by: UROLOGY

## 2017-03-27 PROCEDURE — 76210000006 HC OR PH I REC 0.5 TO 1 HR: Performed by: UROLOGY

## 2017-03-27 PROCEDURE — 77030008522 HC TBNG INSUF LAPRO STRY -B: Performed by: UROLOGY

## 2017-03-27 PROCEDURE — 77030014007 HC SPNG HEMSTAT J&J -B: Performed by: UROLOGY

## 2017-03-27 PROCEDURE — 77030034154 HC SHR COAG HARM ACE J&J -F: Performed by: UROLOGY

## 2017-03-27 RX ORDER — PROPOFOL 10 MG/ML
INJECTION, EMULSION INTRAVENOUS AS NEEDED
Status: DISCONTINUED | OUTPATIENT
Start: 2017-03-27 | End: 2017-03-27 | Stop reason: HOSPADM

## 2017-03-27 RX ORDER — SODIUM CHLORIDE 0.9 % (FLUSH) 0.9 %
5-10 SYRINGE (ML) INJECTION AS NEEDED
Status: DISCONTINUED | OUTPATIENT
Start: 2017-03-27 | End: 2017-03-27 | Stop reason: HOSPADM

## 2017-03-27 RX ORDER — FENTANYL CITRATE 50 UG/ML
INJECTION, SOLUTION INTRAMUSCULAR; INTRAVENOUS AS NEEDED
Status: DISCONTINUED | OUTPATIENT
Start: 2017-03-27 | End: 2017-03-27 | Stop reason: HOSPADM

## 2017-03-27 RX ORDER — ONDANSETRON 2 MG/ML
INJECTION INTRAMUSCULAR; INTRAVENOUS AS NEEDED
Status: DISCONTINUED | OUTPATIENT
Start: 2017-03-27 | End: 2017-03-27 | Stop reason: HOSPADM

## 2017-03-27 RX ORDER — ROCURONIUM BROMIDE 10 MG/ML
INJECTION, SOLUTION INTRAVENOUS AS NEEDED
Status: DISCONTINUED | OUTPATIENT
Start: 2017-03-27 | End: 2017-03-27 | Stop reason: HOSPADM

## 2017-03-27 RX ORDER — NALBUPHINE HYDROCHLORIDE 20 MG/ML
5 INJECTION, SOLUTION INTRAMUSCULAR; INTRAVENOUS; SUBCUTANEOUS
Status: DISCONTINUED | OUTPATIENT
Start: 2017-03-27 | End: 2017-03-27 | Stop reason: HOSPADM

## 2017-03-27 RX ORDER — ACETAMINOPHEN 325 MG/1
650 TABLET ORAL
Status: DISCONTINUED | OUTPATIENT
Start: 2017-03-27 | End: 2017-03-30 | Stop reason: HOSPADM

## 2017-03-27 RX ORDER — DIPHENHYDRAMINE HYDROCHLORIDE 50 MG/ML
12.5 INJECTION, SOLUTION INTRAMUSCULAR; INTRAVENOUS
Status: DISCONTINUED | OUTPATIENT
Start: 2017-03-27 | End: 2017-03-30 | Stop reason: HOSPADM

## 2017-03-27 RX ORDER — VECURONIUM BROMIDE FOR INJECTION 1 MG/ML
INJECTION, POWDER, LYOPHILIZED, FOR SOLUTION INTRAVENOUS AS NEEDED
Status: DISCONTINUED | OUTPATIENT
Start: 2017-03-27 | End: 2017-03-27 | Stop reason: HOSPADM

## 2017-03-27 RX ORDER — ONDANSETRON 2 MG/ML
4 INJECTION INTRAMUSCULAR; INTRAVENOUS
Status: DISCONTINUED | OUTPATIENT
Start: 2017-03-27 | End: 2017-03-30 | Stop reason: HOSPADM

## 2017-03-27 RX ORDER — SODIUM CHLORIDE 0.9 % (FLUSH) 0.9 %
5-10 SYRINGE (ML) INJECTION EVERY 8 HOURS
Status: DISCONTINUED | OUTPATIENT
Start: 2017-03-27 | End: 2017-03-27 | Stop reason: HOSPADM

## 2017-03-27 RX ORDER — CEFAZOLIN SODIUM IN 0.9 % NACL 2 G/50 ML
2 INTRAVENOUS SOLUTION, PIGGYBACK (ML) INTRAVENOUS ONCE
Status: COMPLETED | OUTPATIENT
Start: 2017-03-27 | End: 2017-03-27

## 2017-03-27 RX ORDER — DOCUSATE SODIUM 100 MG/1
100 CAPSULE, LIQUID FILLED ORAL 2 TIMES DAILY
Status: DISCONTINUED | OUTPATIENT
Start: 2017-03-27 | End: 2017-03-30 | Stop reason: HOSPADM

## 2017-03-27 RX ORDER — OXYCODONE HYDROCHLORIDE 5 MG/1
5 TABLET ORAL
Status: DISCONTINUED | OUTPATIENT
Start: 2017-03-27 | End: 2017-03-27 | Stop reason: HOSPADM

## 2017-03-27 RX ORDER — ATROPA BELLADONNA AND OPIUM 16.2; 6 MG/1; MG/1
1 SUPPOSITORY RECTAL
Status: DISCONTINUED | OUTPATIENT
Start: 2017-03-27 | End: 2017-03-30 | Stop reason: HOSPADM

## 2017-03-27 RX ORDER — FLECAINIDE ACETATE 100 MG/1
50 TABLET ORAL EVERY 12 HOURS
Status: DISCONTINUED | OUTPATIENT
Start: 2017-03-27 | End: 2017-03-30 | Stop reason: HOSPADM

## 2017-03-27 RX ORDER — DULOXETIN HYDROCHLORIDE 30 MG/1
30 CAPSULE, DELAYED RELEASE ORAL
Status: DISCONTINUED | OUTPATIENT
Start: 2017-03-27 | End: 2017-03-30 | Stop reason: HOSPADM

## 2017-03-27 RX ORDER — SODIUM CHLORIDE, SODIUM LACTATE, POTASSIUM CHLORIDE, CALCIUM CHLORIDE 600; 310; 30; 20 MG/100ML; MG/100ML; MG/100ML; MG/100ML
100 INJECTION, SOLUTION INTRAVENOUS CONTINUOUS
Status: DISCONTINUED | OUTPATIENT
Start: 2017-03-27 | End: 2017-03-27 | Stop reason: HOSPADM

## 2017-03-27 RX ORDER — HYDROCODONE BITARTRATE AND ACETAMINOPHEN 7.5; 325 MG/1; MG/1
1 TABLET ORAL
Status: DISCONTINUED | OUTPATIENT
Start: 2017-03-27 | End: 2017-03-30 | Stop reason: HOSPADM

## 2017-03-27 RX ORDER — PREDNISONE 5 MG/1
5 TABLET ORAL DAILY
Status: DISCONTINUED | OUTPATIENT
Start: 2017-03-28 | End: 2017-03-30 | Stop reason: HOSPADM

## 2017-03-27 RX ORDER — BUPIVACAINE HYDROCHLORIDE 2.5 MG/ML
INJECTION, SOLUTION EPIDURAL; INFILTRATION; INTRACAUDAL AS NEEDED
Status: DISCONTINUED | OUTPATIENT
Start: 2017-03-27 | End: 2017-03-27 | Stop reason: HOSPADM

## 2017-03-27 RX ORDER — OXYBUTYNIN CHLORIDE 5 MG/1
5 TABLET ORAL
Status: DISCONTINUED | OUTPATIENT
Start: 2017-03-27 | End: 2017-03-30 | Stop reason: HOSPADM

## 2017-03-27 RX ORDER — ONDANSETRON 2 MG/ML
4 INJECTION INTRAMUSCULAR; INTRAVENOUS
Status: DISCONTINUED | OUTPATIENT
Start: 2017-03-27 | End: 2017-03-27 | Stop reason: HOSPADM

## 2017-03-27 RX ORDER — SODIUM CHLORIDE, SODIUM LACTATE, POTASSIUM CHLORIDE, CALCIUM CHLORIDE 600; 310; 30; 20 MG/100ML; MG/100ML; MG/100ML; MG/100ML
INJECTION, SOLUTION INTRAVENOUS
Status: DISCONTINUED | OUTPATIENT
Start: 2017-03-27 | End: 2017-03-27 | Stop reason: HOSPADM

## 2017-03-27 RX ORDER — HYDROMORPHONE HYDROCHLORIDE 2 MG/ML
0.5 INJECTION, SOLUTION INTRAMUSCULAR; INTRAVENOUS; SUBCUTANEOUS
Status: DISCONTINUED | OUTPATIENT
Start: 2017-03-27 | End: 2017-03-27 | Stop reason: HOSPADM

## 2017-03-27 RX ORDER — DEXTROSE MONOHYDRATE AND SODIUM CHLORIDE 5; .45 G/100ML; G/100ML
75 INJECTION, SOLUTION INTRAVENOUS CONTINUOUS
Status: DISPENSED | OUTPATIENT
Start: 2017-03-27 | End: 2017-03-29

## 2017-03-27 RX ORDER — NALOXONE HYDROCHLORIDE 0.4 MG/ML
0.1 INJECTION, SOLUTION INTRAMUSCULAR; INTRAVENOUS; SUBCUTANEOUS
Status: DISCONTINUED | OUTPATIENT
Start: 2017-03-27 | End: 2017-03-27 | Stop reason: HOSPADM

## 2017-03-27 RX ORDER — FLUMAZENIL 0.1 MG/ML
0.2 INJECTION INTRAVENOUS
Status: DISCONTINUED | OUTPATIENT
Start: 2017-03-27 | End: 2017-03-27 | Stop reason: HOSPADM

## 2017-03-27 RX ORDER — PANTOPRAZOLE SODIUM 40 MG/1
40 TABLET, DELAYED RELEASE ORAL DAILY
Status: DISCONTINUED | OUTPATIENT
Start: 2017-03-28 | End: 2017-03-30 | Stop reason: HOSPADM

## 2017-03-27 RX ORDER — DEXAMETHASONE SODIUM PHOSPHATE 4 MG/ML
INJECTION, SOLUTION INTRA-ARTICULAR; INTRALESIONAL; INTRAMUSCULAR; INTRAVENOUS; SOFT TISSUE AS NEEDED
Status: DISCONTINUED | OUTPATIENT
Start: 2017-03-27 | End: 2017-03-27 | Stop reason: HOSPADM

## 2017-03-27 RX ORDER — DILTIAZEM HYDROCHLORIDE 180 MG/1
180 CAPSULE, COATED, EXTENDED RELEASE ORAL
Status: DISCONTINUED | OUTPATIENT
Start: 2017-03-27 | End: 2017-03-30 | Stop reason: HOSPADM

## 2017-03-27 RX ORDER — ATORVASTATIN CALCIUM 40 MG/1
40 TABLET, FILM COATED ORAL
Status: DISCONTINUED | OUTPATIENT
Start: 2017-03-28 | End: 2017-03-30 | Stop reason: HOSPADM

## 2017-03-27 RX ORDER — MESALAMINE 800 MG/1
800 TABLET, DELAYED RELEASE ORAL 2 TIMES DAILY
Status: DISCONTINUED | OUTPATIENT
Start: 2017-03-27 | End: 2017-03-30 | Stop reason: HOSPADM

## 2017-03-27 RX ORDER — MIDAZOLAM HYDROCHLORIDE 1 MG/ML
2 INJECTION, SOLUTION INTRAMUSCULAR; INTRAVENOUS
Status: DISCONTINUED | OUTPATIENT
Start: 2017-03-27 | End: 2017-03-27 | Stop reason: HOSPADM

## 2017-03-27 RX ORDER — LIDOCAINE HYDROCHLORIDE 10 MG/ML
0.1 INJECTION INFILTRATION; PERINEURAL AS NEEDED
Status: DISCONTINUED | OUTPATIENT
Start: 2017-03-27 | End: 2017-03-27 | Stop reason: HOSPADM

## 2017-03-27 RX ORDER — MORPHINE SULFATE 2 MG/ML
2 INJECTION, SOLUTION INTRAMUSCULAR; INTRAVENOUS
Status: DISCONTINUED | OUTPATIENT
Start: 2017-03-27 | End: 2017-03-30 | Stop reason: HOSPADM

## 2017-03-27 RX ORDER — LIDOCAINE HYDROCHLORIDE 20 MG/ML
INJECTION, SOLUTION EPIDURAL; INFILTRATION; INTRACAUDAL; PERINEURAL AS NEEDED
Status: DISCONTINUED | OUTPATIENT
Start: 2017-03-27 | End: 2017-03-27 | Stop reason: HOSPADM

## 2017-03-27 RX ADMIN — ONDANSETRON HYDROCHLORIDE 4 MG: 2 INJECTION, SOLUTION INTRAMUSCULAR; INTRAVENOUS at 20:45

## 2017-03-27 RX ADMIN — MIDAZOLAM HYDROCHLORIDE 2 MG: 1 INJECTION, SOLUTION INTRAMUSCULAR; INTRAVENOUS at 07:28

## 2017-03-27 RX ADMIN — ONDANSETRON HYDROCHLORIDE 4 MG: 2 INJECTION, SOLUTION INTRAMUSCULAR; INTRAVENOUS at 16:31

## 2017-03-27 RX ADMIN — HYDROCODONE BITARTRATE AND ACETAMINOPHEN 1 TABLET: 7.5; 325 TABLET ORAL at 16:40

## 2017-03-27 RX ADMIN — FENTANYL CITRATE 50 MCG: 50 INJECTION, SOLUTION INTRAMUSCULAR; INTRAVENOUS at 08:55

## 2017-03-27 RX ADMIN — CEFAZOLIN SODIUM 1 G: 1 INJECTION, POWDER, FOR SOLUTION INTRAMUSCULAR; INTRAVENOUS at 23:49

## 2017-03-27 RX ADMIN — DEXAMETHASONE SODIUM PHOSPHATE 8 MG: 4 INJECTION, SOLUTION INTRA-ARTICULAR; INTRALESIONAL; INTRAMUSCULAR; INTRAVENOUS; SOFT TISSUE at 11:30

## 2017-03-27 RX ADMIN — FLECAINIDE ACETATE 50 MG: 100 TABLET ORAL at 20:45

## 2017-03-27 RX ADMIN — PROPOFOL 170 MG: 10 INJECTION, EMULSION INTRAVENOUS at 07:54

## 2017-03-27 RX ADMIN — FENTANYL CITRATE 100 MCG: 50 INJECTION, SOLUTION INTRAMUSCULAR; INTRAVENOUS at 07:54

## 2017-03-27 RX ADMIN — SODIUM CHLORIDE, SODIUM LACTATE, POTASSIUM CHLORIDE, AND CALCIUM CHLORIDE: 600; 310; 30; 20 INJECTION, SOLUTION INTRAVENOUS at 10:15

## 2017-03-27 RX ADMIN — CEFAZOLIN SODIUM 1 G: 1 INJECTION, POWDER, FOR SOLUTION INTRAMUSCULAR; INTRAVENOUS at 14:36

## 2017-03-27 RX ADMIN — DILTIAZEM HYDROCHLORIDE 180 MG: 180 CAPSULE, COATED, EXTENDED RELEASE ORAL at 17:20

## 2017-03-27 RX ADMIN — LIDOCAINE HYDROCHLORIDE 100 MG: 20 INJECTION, SOLUTION EPIDURAL; INFILTRATION; INTRACAUDAL; PERINEURAL at 07:54

## 2017-03-27 RX ADMIN — Medication 2 MG: at 14:27

## 2017-03-27 RX ADMIN — DEXTROSE MONOHYDRATE AND SODIUM CHLORIDE 125 ML/HR: 5; .45 INJECTION, SOLUTION INTRAVENOUS at 14:35

## 2017-03-27 RX ADMIN — VECURONIUM BROMIDE FOR INJECTION 2 MG: 1 INJECTION, POWDER, LYOPHILIZED, FOR SOLUTION INTRAVENOUS at 08:40

## 2017-03-27 RX ADMIN — FENTANYL CITRATE 50 MCG: 50 INJECTION, SOLUTION INTRAMUSCULAR; INTRAVENOUS at 08:48

## 2017-03-27 RX ADMIN — ONDANSETRON 4 MG: 2 INJECTION INTRAMUSCULAR; INTRAVENOUS at 11:30

## 2017-03-27 RX ADMIN — SODIUM CHLORIDE, SODIUM LACTATE, POTASSIUM CHLORIDE, AND CALCIUM CHLORIDE 100 ML/HR: 600; 310; 30; 20 INJECTION, SOLUTION INTRAVENOUS at 07:00

## 2017-03-27 RX ADMIN — HYDROMORPHONE HYDROCHLORIDE 0.5 MG: 2 INJECTION, SOLUTION INTRAMUSCULAR; INTRAVENOUS; SUBCUTANEOUS at 12:19

## 2017-03-27 RX ADMIN — VECURONIUM BROMIDE FOR INJECTION 2 MG: 1 INJECTION, POWDER, LYOPHILIZED, FOR SOLUTION INTRAVENOUS at 10:15

## 2017-03-27 RX ADMIN — DOCUSATE SODIUM 100 MG: 100 CAPSULE, LIQUID FILLED ORAL at 17:19

## 2017-03-27 RX ADMIN — SODIUM CHLORIDE, SODIUM LACTATE, POTASSIUM CHLORIDE, CALCIUM CHLORIDE: 600; 310; 30; 20 INJECTION, SOLUTION INTRAVENOUS at 10:15

## 2017-03-27 RX ADMIN — DULOXETINE HYDROCHLORIDE 30 MG: 30 CAPSULE, DELAYED RELEASE ORAL at 17:21

## 2017-03-27 RX ADMIN — LOSARTAN POTASSIUM: 50 TABLET ORAL at 17:21

## 2017-03-27 RX ADMIN — OXYBUTYNIN CHLORIDE 5 MG: 5 TABLET ORAL at 20:45

## 2017-03-27 RX ADMIN — VECURONIUM BROMIDE FOR INJECTION 2 MG: 1 INJECTION, POWDER, LYOPHILIZED, FOR SOLUTION INTRAVENOUS at 09:23

## 2017-03-27 RX ADMIN — MESALAMINE 800 MG: 800 TABLET, DELAYED RELEASE ORAL at 17:23

## 2017-03-27 RX ADMIN — SODIUM CHLORIDE, SODIUM LACTATE, POTASSIUM CHLORIDE, AND CALCIUM CHLORIDE: 600; 310; 30; 20 INJECTION, SOLUTION INTRAVENOUS at 07:49

## 2017-03-27 RX ADMIN — HYDROMORPHONE HYDROCHLORIDE 0.5 MG: 2 INJECTION, SOLUTION INTRAMUSCULAR; INTRAVENOUS; SUBCUTANEOUS at 12:29

## 2017-03-27 RX ADMIN — FENTANYL CITRATE 50 MCG: 50 INJECTION, SOLUTION INTRAMUSCULAR; INTRAVENOUS at 10:00

## 2017-03-27 RX ADMIN — FENTANYL CITRATE 50 MCG: 50 INJECTION, SOLUTION INTRAMUSCULAR; INTRAVENOUS at 10:56

## 2017-03-27 RX ADMIN — HYDROCODONE BITARTRATE AND ACETAMINOPHEN 1 TABLET: 7.5; 325 TABLET ORAL at 20:45

## 2017-03-27 RX ADMIN — ROCURONIUM BROMIDE 50 MG: 10 INJECTION, SOLUTION INTRAVENOUS at 07:54

## 2017-03-27 RX ADMIN — SODIUM CHLORIDE, SODIUM LACTATE, POTASSIUM CHLORIDE, CALCIUM CHLORIDE: 600; 310; 30; 20 INJECTION, SOLUTION INTRAVENOUS at 08:00

## 2017-03-27 RX ADMIN — CEFAZOLIN 2 G: 1 INJECTION, POWDER, FOR SOLUTION INTRAMUSCULAR; INTRAVENOUS; PARENTERAL at 08:20

## 2017-03-27 NOTE — PROGRESS NOTES
Dual skin assessment completed with Ashleigh Keenan RN. No visible signs of sores or wounds. No skin breakdown. Coles intact with clear output. SCDs connected. Incentive spirometer given and insnstructed how to use, pt and family voiced understanding. Pt has active bowel sounds and clear lungs. Opportunity for questions and carnifications given. Call light is in reach.

## 2017-03-27 NOTE — ANESTHESIA POSTPROCEDURE EVALUATION
Post-Anesthesia Evaluation and Assessment    Patient: Anderson Phalen MRN: [id number]  SSN: [id number]    YOB: 1945  Age: 80 y.o. Sex: male       Cardiovascular Function/Vital Signs  Visit Vitals    /80    Pulse 69    Temp 36.8 °C (98.3 °F)    Resp 16    SpO2 97%       Patient is status post general anesthesia for Procedure(s):  LEFT NEPHRECTOMY LAPAROSCOPIC HAND ASSISTED / RADICAL / CYSTOSCOPY   . Nausea/Vomiting: None    Postoperative hydration reviewed and adequate. Pain:  Pain Scale 1: Visual (03/27/17 1237)  Pain Intensity 1: 0 (03/27/17 1237)   Managed    Neurological Status:   Neuro (WDL): Exceptions to WDL (03/27/17 1237)  Neuro  Neurologic State: Appropriate for age;Sleeping (03/27/17 1237)   At baseline    Mental Status and Level of Consciousness: Arousable    Pulmonary Status:   O2 Device: Nasal cannula (03/27/17 1237)   Adequate oxygenation and airway patent    Complications related to anesthesia: None    Post-anesthesia assessment completed.  No concerns    Signed By: Sandra Hughes MD     March 27, 2017

## 2017-03-27 NOTE — PERIOP NOTES
TRANSFER - OUT REPORT:    Verbal report given to [employer]) on Eveline Julian  being transferred to [facility](unit) for routine post - op       Report consisted of patients Situation, Background, Assessment and   Recommendations(SBAR). Information from the following report(s) SBAR, Kardex, OR Summary, Procedure Summary, Intake/Output and MAR was reviewed with the receiving nurse. Lines:   Peripheral IV 03/27/17 Right Wrist (Active)   Site Assessment Clean, dry, & intact 3/27/2017 12:37 PM   Phlebitis Assessment 0 3/27/2017 12:37 PM   Infiltration Assessment 0 3/27/2017 12:37 PM   Dressing Status Clean, dry, & intact 3/27/2017 12:37 PM   Dressing Type Tape;Transparent 3/27/2017 11:53 AM   Hub Color/Line Status Green; Infusing 3/27/2017 11:53 AM        Opportunity for questions and clarification was provided. Patient transported with:   O2 @ 3 liters    VTE prophylaxis orders have been written for Eveline Julian. Patient and family given floor number and nurses name. Family updated re: pt status after security code verified.

## 2017-03-27 NOTE — PROGRESS NOTES
TRANSFER - IN REPORT:    Verbal report received from [facility] (name) on Maninder Walker  being received from Post op.(unit) for routine progression of care      Report consisted of patients Situation, Background, Assessment and   Recommendations(SBAR). Information from the following report(s) SBAR, Procedure Summary, MAR and Recent Results was reviewed with the receiving nurse. Opportunity for questions and clarification was provided. Assessment completed upon patients arrival to unit and care assumed.

## 2017-03-27 NOTE — IP AVS SNAPSHOT
Bouchra Dominguez 
 
 
 2329 [facility] 322 [facility] 
[phone] Patient: Eveline Julian MRN: [id number] :1945 You are allergic to the following Allergen Reactions Ambien (Zolpidem) Other (comments) Confusion Codeine Other (comments) \"jittery\" Recent Documentation Smoking Status Former Smoker Emergency Contacts Name Discharge Info Relation Home Work Mobile Vikki Raymond DISCHARGE CAREGIVER [3] Spouse [3] [phone] About your hospitalization You were admitted on:  2017 You last received care in the:  [facility] 6 MED SURG You were discharged on:  2017 Unit phone number:  [phone] Why you were hospitalized Your primary diagnosis was:  Not on File Your diagnoses also included:  Renal Mass Providers Seen During Your Hospitalizations Provider Role Specialty Primary office phone Little Gaston DO Attending Provider Urology [phone] Your Primary Care Physician (PCP) Primary Care Physician Office Phone Office Fax [address] [phone] Follow-up Information Follow up With Details Comments Contact Info Rupesh Bernard MD   [address] [facility] [place] [address] 
[phone] Syd Le DO On 2017 at 11:40 [address] [address] 
[phone] Your Appointments 2017 12:30 PM EDT  
SC PT RECUR VISIT URINARY with Clare Goff PT  
[facility] OP REHAB PT ([facility]) [address] [place] [id number] [phone] 2017 11:40 AM EDT Office Visit with Little Gaston DO [facility] HT ([facility]) [address] [place] [address] [address]  
[phone] Friday April 07, 2017 12:30 PM EDT  
SC PT RECUR VISIT URINARY with Anelquinn Giangief, PT  
[facility] OP REHAB PT ([facility] [address] [facility] [address]) [address] [place] [id number]  
[phone] Thursday April 13, 2017  9:00 AM EDT New Patient with Jonathan Hebert MD  
[facility] Pulmonary and Critical Care ([facility]) 75 [facility] [address]  
[phone] Current Discharge Medication List  
  
START taking these medications Dose & Instructions Dispensing Information Comments Morning Noon Evening Bedtime  
 docusate sodium 100 mg capsule Commonly known as:  Estrella Arley Your next dose is: Today with supper Dose:  100 mg Take 1 Cap by mouth two (2) times a day for 90 days. Quantity:  60 Cap Refills:  2 CONTINUE these medications which have CHANGED Dose & Instructions Dispensing Information Comments Morning Noon Evening Bedtime * HYDROcodone-acetaminophen 7.5-325 mg per tablet Commonly known as:  Jerl Ards What changed:  Another medication with the same name was added. Make sure you understand how and when to take each. Your next dose is:  As needed Dose:  2 Tab Take 2 Tabs by mouth every four (4) hours as needed. Max Daily Amount: 12 Tabs. Quantity:  25 Tab Refills:  0  
     
   
   
   
  
 * HYDROcodone-acetaminophen 7.5-325 mg per tablet Commonly known as:  Jerl Ards What changed: You were already taking a medication with the same name, and this prescription was added. Make sure you understand how and when to take each. Dose:  1 Tab Take 1 Tab by mouth every four (4) hours as needed. Max Daily Amount: 6 Tabs. Quantity:  25 Tab Refills:  0  
     
   
   
   
  
 * Notice:   This list has 2 medication(s) that are the same as other medications prescribed for you. Read the directions carefully, and ask your doctor or other care provider to review them with you. CONTINUE these medications which have NOT CHANGED Dose & Instructions Dispensing Information Comments Morning Noon Evening Bedtime  
 aspirin delayed-release 81 mg tablet Your next dose is:  3/31 Take  by mouth daily. Refills:  0  
     
   
   
   
  
 dilTIAZem  mg XR capsule Commonly known as:  DILACOR XR Your next dose is: Today with supper Dose:  180 mg Take 180 mg by mouth daily (with dinner). Refills:  0 DULoxetine 30 mg capsule Commonly known as:  CYMBALTA Your next dose is: Today with supper Dose:  30 mg Take 30 mg by mouth daily (with dinner). Refills:  0  
     
   
   
  
   
  
 EYE-EMILY EXTRA + LUTEIN PO Your next dose is:  3/31 Take  by mouth. Refills:  0  
     
   
   
   
  
 flecainide 50 mg tablet Commonly known as:  TAMBOCOR Your next dose is: Today at bedtime Dose:  50 mg Take 50 mg by mouth every twelve (12) hours. Refills:  0 LIPITOR 40 mg tablet Generic drug:  atorvastatin Your next dose is:  3/31 Dose:  40 mg Take 40 mg by mouth every morning. Take day of surgery per anesthesia protocol. Indications: HYPERCHOLESTEROLEMIA Refills:  0  
     
   
   
   
  
 losartan-hydroCHLOROthiazide 100-25 mg per tablet Commonly known as:  HYZAAR Your next dose is: Today at bedtime TAKE 1/2 TO 1 TABLET DAILY AS DIRECTED FOR HIGH BLOOD PRESSURE , at bedtime Refills:  0  
     
   
   
   
  
  
 mesalamine  mg DR tablet Commonly known as:  ASACOL HD Your next dose is: Today with supper Dose:  800 mg Take 800 mg by mouth two (2) times a day. Refills:  0  
     
   
   
  
   
  
 predniSONE 5 mg tablet Commonly known as:  Gayla Abebe Your next dose is:  3/31 Dose:  5 mg Take 5 mg by mouth daily. Indications: Ulcerative Colitis Refills:  0 PROTONIX 20 mg tablet Generic drug:  pantoprazole Your next dose is:  3/31 Dose:  40 mg Take 40 mg by mouth daily. Take day of surgery per anesthesia protocol. Indications: GASTROESOPHAGEAL REFLUX Refills:  0  
     
   
   
   
  
 tadalafil 20 mg tablet Commonly known as:  CIALIS Your next dose is:  Hold for 2 weeks Dose:  20 mg Take 1 Tab by mouth as needed. Quantity:  6 Tab Refills:  99 VITAMIN D2 50,000 unit capsule Generic drug:  ergocalciferol Your next dose is:  monday Dose:  37422 Units Take 50,000 Units by mouth every Monday. Refills:  0 STOP taking these medications REMICADE IV Where to Get Your Medications Information on where to get these meds will be given to you by the nurse or doctor. ! Ask your nurse or doctor about these medications  
  docusate sodium 100 mg capsule HYDROcodone-acetaminophen 7.5-325 mg per tablet Discharge Instructions DISCHARGE SUMMARY from Nurse The following personal items are in your possession at time of discharge: 
 
Dental Appliances: Uppers Jewelry: None Clothing: Shirt, Pants, Footwear, Undergarments Other Valuables: None PATIENT INSTRUCTIONS: 
 
 
F-face looks uneven A-arms unable to move or move unevenly S-speech slurred or non-existent T-time-call 911 as soon as signs and symptoms begin-DO NOT go  
 Back to bed or wait to see if you get better-TIME IS BRAIN. Warning Signs of HEART ATTACK Call 911 if you have these symptoms: 
? Chest discomfort. Most heart attacks involve discomfort in the center of the chest that lasts more than a few minutes, or that goes away and comes back. It can feel like uncomfortable pressure, squeezing, fullness, or pain. ? Discomfort in other areas of the upper body. Symptoms can include pain or discomfort in one or both arms, the back, neck, jaw, or stomach. ? Shortness of breath with or without chest discomfort. ? Other signs may include breaking out in a cold sweat, nausea, or lightheadedness. Don't wait more than five minutes to call [address]! Fast action can save your life. Calling 911 is almost always the fastest way to get lifesaving treatment. Emergency Medical Services staff can begin treatment when they arrive  up to an hour sooner than if someone gets to the hospital by car. The discharge information has been reviewed with the patient. The patient verbalized understanding. Discharge medications reviewed with the patient and appropriate educational materials and side effects teaching were provided. Discharge Instructions Attachments/References NEPHRECTOMY: LAPAROSCOPIC: POST-OP (ENGLISH) Discharge Orders None Introducing [facility] SERVICES! Dear Sangeeta Foss: Thank you for requesting a [employer] account. Our records indicate that you already have an active [employer] account. You can access your account anytime at [email or web address]. [email or web address] Did you know that you can access your hospital and ER discharge instructions at any time in [employer]? You can also review all of your test results from your hospital stay or ER visit. Additional Information If you have questions, please visit the Frequently Asked Questions section of the [employer] website at [email or web address]/. Remember, MyChart is NOT to be used for urgent needs. For medical emergencies, dial 911. Now available from your iPhone and Android! General Information Please provide this summary of care documentation to your next provider. Patient Signature:  ____________________________________________________________ Date:  ____________________________________________________________  
  
Crane Ledger Provider Signature:  ____________________________________________________________ Date:  ____________________________________________________________ More Information Laparoscopic Nephrectomy: What to Expect at [facility] Your Recovery A nephrectomy is surgery to take out part or all of the kidney. One or both kidneys may be taken out. Sometimes other tissue near the kidney is taken out at the same time. Your belly will feel sore after the surgery. This usually lasts about 1 to 2 weeks. Your doctor will give you pain medicine for this. You may also have other symptoms such as nausea, diarrhea, constipation, gas, or a headache. At first, you may have low energy and get tired quickly. It may take 3 to 6 months for your energy to fully return. Your body can work fine with one healthy kidney. If both kidneys are removed or your remaining kidney is not healthy, your doctor will talk to you about the kind of treatment you will need after surgery. This care sheet gives you a general idea about how long it will take for you to recover. But each person recovers at a different pace. Follow the steps below to get better as quickly as possible. How can you care for yourself at home? Activity · Rest when you feel tired. Getting enough sleep will help you recover. · Try to walk each day. Start by walking a little more than you did the day before. Bit by bit, increase the amount you walk. Walking boosts blood flow and helps prevent pneumonia and constipation. · Avoid exercises that use your belly muscles and strenuous activities such as bicycle riding, jogging, weight lifting, or aerobic exercise until your doctor says it is okay. · For at least 4 weeks, avoid lifting anything that would make you strain. This may include a child, heavy grocery bags and milk containers, a heavy briefcase or backpack, cat litter or dog food bags, or a vacuum . · Hold a pillow over the cuts the doctor made (incisions) when you cough or take deep breaths. This will support your belly and decrease your pain. · Do breathing exercises at home as instructed by your doctor. This will help prevent pneumonia. · Ask your doctor when you can drive again. · You will probably need to take 4 to 6 weeks off from work. It depends on the type of work you do and how you feel. · You may be able to take showers (unless you have a drainage tube near your incisions). If you have a drainage tube, follow your doctor's instructions to empty and care for it. Do not take a bath for the first 2 weeks, or until your doctor tells you it is okay. · Ask your doctor when it is okay for you to have sex. Diet · You can eat your normal diet. If you were on a special diet for your kidneys before surgery, follow that diet until your doctor tells you to stop. · If your stomach is upset, try bland, low-fat foods like plain rice, broiled chicken, toast, and yogurt. · Drink plenty of fluids (unless your doctor tells you not to). · You may notice that your bowel movements are not regular right after your surgery. This is common. Try to avoid constipation and straining with bowel movements. You may want to take a fiber supplement every day. If you have not had a bowel movement after a couple of days, ask your doctor about taking a mild laxative. Medicines · Your doctor will tell you if and when you can restart your medicines. He or she will also give you instructions about taking any new medicines. · If you take blood thinners, such as warfarin (Coumadin), clopidogrel (Plavix), or aspirin, be sure to talk to your doctor. He or she will tell you if and when to start taking those medicines again. Make sure that you understand exactly what your doctor wants you to do. · Take pain medicines exactly as directed. ¨ If the doctor gave you a prescription medicine for pain, take it as prescribed. ¨ If you are not taking a prescription pain medicine, take an over-the-counter medicine that your doctor recommends. Read and follow all instructions on the label. ¨ Do not take aspirin, ibuprofen (Advil, Motrin), or naproxen (Aleve), or other nonsteroidal anti-inflammatory drugs (NSAIDs) unless your doctor says it is okay. · If you think your pain medicine is making you sick to your stomach: 
¨ Take your medicine after meals (unless your doctor has told you not to). ¨ Ask your doctor for a different pain medicine. · If your doctor prescribed antibiotics, take them as directed. Do not stop taking them just because you feel better. You need to take the full course of antibiotics. Incision care · If you have strips of tape on the incisions, leave the tape on for a week or until it falls off. · Wash the area around the incisions daily with warm, soapy water and pat it dry. Don't use hydrogen peroxide or alcohol, which can slow healing. You may cover the incisions with gauze bandages if they weep or rub against clothing. Change the bandages every day. · Keep the area around the incisions clean and dry. Follow-up care is a key part of your treatment and safety. Be sure to make and go to all appointments, and call your doctor if you are having problems. It's also a good idea to know your test results and keep a list of the medicines you take. When should you call for help? Call 911 anytime you think you may need emergency care. For example, call if: 
· You passed out (lost consciousness). · You have severe trouble breathing. · You have sudden chest pain and shortness of breath, or you cough up blood. · You have severe pain in your belly. Call your doctor now or seek immediate medical care if: 
· You have pain that does not get better after you take your pain medicine. · You have symptoms of a urinary infection. For example: ¨ You have blood or pus in your urine. ¨ You have pain in your back just below your rib cage. This is called flank pain. ¨ You have a fever, chills, or body aches. ¨ It hurts to urinate. ¨ You have groin or belly pain. · You have signs of infection, such as: 
¨ Increased pain, swelling, warmth, or redness. ¨ Red streaks leading from the incisions. ¨ Pus draining from the incisions. ¨ Swollen lymph nodes in your neck, armpits, or groin. ¨ A fever. · You have loose stitches, or your incisions come open. · You are bleeding from the incisions. · You have trouble passing urine or stool, especially if you have pain or swelling in your lower belly. Watch closely for changes in your health, and be sure to contact your doctor if: 
· You do not have a bowel movement after taking a laxative. Where can you learn more? Go to http://benson-heath.info/. Enter [id number] in the search box to learn more about \"Laparoscopic Nephrectomy: What to Expect at Home. \" Current as of: November 20, 2015 Content Version: 11.2 © 2029-4548 [employer]. Care instructions adapted under license by [employer] (which disclaims liability or warranty for this information). If you have questions about a medical condition or this instruction, always ask your healthcare professional. [address] [address] any warranty or liability for your use of this information.

## 2017-03-27 NOTE — ANESTHESIA PREPROCEDURE EVALUATION
Anesthetic History   No history of anesthetic complications            Review of Systems / Medical History  Patient summary reviewed and pertinent labs reviewed    Pulmonary          Undiagnosed apnea         Neuro/Psych              Cardiovascular    Hypertension: well controlled        Dysrhythmias : atrial fibrillation  Hyperlipidemia    Exercise tolerance: >4 METS     GI/Hepatic/Renal     GERD: well controlled    Renal disease: CRI      Comments: UC Endo/Other        Arthritis and cancer (Prostate)     Other Findings            Physical Exam    Airway  Mallampati: II  TM Distance: 4 - 6 cm  Neck ROM: normal range of motion   Mouth opening: Normal     Cardiovascular  Regular rate and rhythm,  S1 and S2 normal,  no murmur, click, rub, or gallop  Rhythm: regular           Dental    Dentition: Full upper dentures     Pulmonary  Breath sounds clear to auscultation               Abdominal  GI exam deferred       Other Findings            Anesthetic Plan    ASA: 2  Anesthesia type: general          Induction: Intravenous  Anesthetic plan and risks discussed with: Patient and Spouse      Off Eliquis to bleeding and clot retention in bladder. On ASA for Afib but held per cardiology perioperatively.

## 2017-03-27 NOTE — PROGRESS NOTES
's visit in PACU to convey care and concern after surgery. Mr. Shahrzad Perez received a visit from his  for prayer, prior to surgery.      Rod Harrell 69  Board Certified

## 2017-03-27 NOTE — BRIEF OP NOTE
BRIEF OPERATIVE NOTE    Date of Procedure: 3/27/2017   Preoperative Diagnosis: Left Renal mass [N28.89]  Postoperative Diagnosis: Left Renal mass [N28.89]    Procedure(s):  LEFT RADICAL NEPHRECTOMY LAPAROSCOPIC HAND ASSISTED AND CYSTOSCOPY/CASTILLO PLACEMENT  Surgeon(s) and Role:     * Marija Emerson DO - Primary     * Becky Mata MD - Assisting            Surgical Staff:  Circ-1: Sung Jackson RN  Scrub Tech-1: Harriett Raman  Scrub Tech-2: Tanner Dhillon Time In   Incision Start 0802   Incision Close 1140     Anesthesia: General   Estimated Blood Loss: 50cc  Specimens:   ID Type Source Tests Collected by Time Destination   1 : LEFT KIDNEY Fresh Kidney  Tutu Gomesjamal JacobrettDO 3/27/2017 1136 Pathology      Findings: see op note  Complications: none immediate  Implants: * No implants in log *

## 2017-03-28 LAB
ANION GAP BLD CALC-SCNC: 12 MMOL/L (ref 7–16)
BUN SERPL-MCNC: 33 MG/DL (ref 8–23)
CALCIUM SERPL-MCNC: 8.3 MG/DL (ref 8.3–10.4)
CHLORIDE SERPL-SCNC: 100 MMOL/L (ref 98–107)
CO2 SERPL-SCNC: 25 MMOL/L (ref 21–32)
CREAT SERPL-MCNC: 2.84 MG/DL (ref 0.8–1.5)
GLUCOSE SERPL-MCNC: 132 MG/DL (ref 65–100)
HCT VFR BLD AUTO: 28.6 % (ref 41.1–50.3)
HGB BLD-MCNC: 9.1 G/DL (ref 13.6–17.2)
POTASSIUM SERPL-SCNC: 4 MMOL/L (ref 3.5–5.1)
SODIUM SERPL-SCNC: 137 MMOL/L (ref 136–145)

## 2017-03-28 PROCEDURE — 74011250637 HC RX REV CODE- 250/637: Performed by: UROLOGY

## 2017-03-28 PROCEDURE — 74011636637 HC RX REV CODE- 636/637: Performed by: UROLOGY

## 2017-03-28 PROCEDURE — 80048 BASIC METABOLIC PNL TOTAL CA: CPT | Performed by: UROLOGY

## 2017-03-28 PROCEDURE — 65270000029 HC RM PRIVATE

## 2017-03-28 PROCEDURE — 74011000258 HC RX REV CODE- 258: Performed by: UROLOGY

## 2017-03-28 PROCEDURE — 85018 HEMOGLOBIN: CPT | Performed by: UROLOGY

## 2017-03-28 PROCEDURE — 36415 COLL VENOUS BLD VENIPUNCTURE: CPT | Performed by: UROLOGY

## 2017-03-28 PROCEDURE — 74011250636 HC RX REV CODE- 250/636: Performed by: UROLOGY

## 2017-03-28 RX ADMIN — MESALAMINE 800 MG: 800 TABLET, DELAYED RELEASE ORAL at 08:20

## 2017-03-28 RX ADMIN — HYDROCODONE BITARTRATE AND ACETAMINOPHEN 1 TABLET: 7.5; 325 TABLET ORAL at 03:47

## 2017-03-28 RX ADMIN — DIPHENHYDRAMINE HYDROCHLORIDE 12.5 MG: 50 INJECTION, SOLUTION INTRAMUSCULAR; INTRAVENOUS at 03:47

## 2017-03-28 RX ADMIN — DULOXETINE HYDROCHLORIDE 30 MG: 30 CAPSULE, DELAYED RELEASE ORAL at 16:51

## 2017-03-28 RX ADMIN — LOSARTAN POTASSIUM: 50 TABLET ORAL at 08:15

## 2017-03-28 RX ADMIN — FLECAINIDE ACETATE 50 MG: 100 TABLET ORAL at 20:15

## 2017-03-28 RX ADMIN — CEFAZOLIN SODIUM 1 G: 1 INJECTION, POWDER, FOR SOLUTION INTRAMUSCULAR; INTRAVENOUS at 06:38

## 2017-03-28 RX ADMIN — DILTIAZEM HYDROCHLORIDE 180 MG: 180 CAPSULE, COATED, EXTENDED RELEASE ORAL at 16:51

## 2017-03-28 RX ADMIN — HYDROCODONE BITARTRATE AND ACETAMINOPHEN 1 TABLET: 7.5; 325 TABLET ORAL at 12:43

## 2017-03-28 RX ADMIN — HYDROCODONE BITARTRATE AND ACETAMINOPHEN 1 TABLET: 7.5; 325 TABLET ORAL at 08:21

## 2017-03-28 RX ADMIN — DEXTROSE MONOHYDRATE AND SODIUM CHLORIDE 125 ML/HR: 5; .45 INJECTION, SOLUTION INTRAVENOUS at 21:27

## 2017-03-28 RX ADMIN — FLECAINIDE ACETATE 50 MG: 100 TABLET ORAL at 08:16

## 2017-03-28 RX ADMIN — HYDROCODONE BITARTRATE AND ACETAMINOPHEN 1 TABLET: 7.5; 325 TABLET ORAL at 21:22

## 2017-03-28 RX ADMIN — ONDANSETRON HYDROCHLORIDE 4 MG: 2 INJECTION, SOLUTION INTRAMUSCULAR; INTRAVENOUS at 14:03

## 2017-03-28 RX ADMIN — DOCUSATE SODIUM 100 MG: 100 CAPSULE, LIQUID FILLED ORAL at 08:27

## 2017-03-28 RX ADMIN — DIPHENHYDRAMINE HYDROCHLORIDE 12.5 MG: 50 INJECTION, SOLUTION INTRAMUSCULAR; INTRAVENOUS at 21:23

## 2017-03-28 RX ADMIN — PANTOPRAZOLE SODIUM 40 MG: 40 TABLET, DELAYED RELEASE ORAL at 08:19

## 2017-03-28 RX ADMIN — DEXTROSE MONOHYDRATE AND SODIUM CHLORIDE 125 ML/HR: 5; .45 INJECTION, SOLUTION INTRAVENOUS at 01:16

## 2017-03-28 RX ADMIN — MESALAMINE 800 MG: 800 TABLET, DELAYED RELEASE ORAL at 16:51

## 2017-03-28 RX ADMIN — DEXTROSE MONOHYDRATE AND SODIUM CHLORIDE 125 ML/HR: 5; .45 INJECTION, SOLUTION INTRAVENOUS at 13:59

## 2017-03-28 RX ADMIN — PREDNISONE 5 MG: 5 TABLET ORAL at 08:20

## 2017-03-28 RX ADMIN — ATORVASTATIN CALCIUM 40 MG: 40 TABLET, FILM COATED ORAL at 08:00

## 2017-03-28 RX ADMIN — DOCUSATE SODIUM 100 MG: 100 CAPSULE, LIQUID FILLED ORAL at 16:51

## 2017-03-28 RX ADMIN — HYDROCODONE BITARTRATE AND ACETAMINOPHEN 1 TABLET: 7.5; 325 TABLET ORAL at 17:04

## 2017-03-28 NOTE — PROGRESS NOTES
END OF SHIFT NOTE:    INTAKE/OUTPUT  03/27 0701 - 03/28 0700  In: 3150 [I.V.:3150]  Out: 1500 [Urine:1400]  Voiding: YES  Catheter: YES  Color: clear    DIET  clear    Flatus: Patient does not have flatus present, but is burping. Stool:  0 occurrences. Characteristics:       Ambulating  No    Emesis: 1 occurrences.     Characteristics:          VITAL SIGNS  Patient Vitals for the past 12 hrs:   Temp Pulse Resp BP SpO2   03/28/17 0512 98 °F (36.7 °C) 83 18 151/87 99 %   03/28/17 0113 98 °F (36.7 °C) 86 18 151/87 97 %   03/27/17 2041 97.7 °F (36.5 °C) 82 18 (!) 163/94 100 %       Pain Assessment  Pain Intensity 1: 0 (03/28/17 0010)  Pain Location 1: Abdomen, Chest  Pain Intervention(s) 1: Medication (see MAR)  Patient Stated Pain Goal: 0            Jayna Berrios RN

## 2017-03-28 NOTE — PROGRESS NOTES
Norco 7.5/325mg po given for c/o of pain. Benadryl 12.5mg slow IVP given per request for rest.  Taking sips of cl liquids. Abd soft. No increase in old drainage on abd dressing. Coles with good UOP. Wife at bs.

## 2017-03-28 NOTE — PROGRESS NOTES
Reports LUQ inc pain and L shoulder pain. Denies flatus. AVSS  Abd soft, ND. Dressings c/d/i  Hgb stable. BMP pending. UOP stable and clear  S/P L HALN POD#1  Remove Coles. Ambulate in martin. Await flatus to advance diet.

## 2017-03-28 NOTE — PROGRESS NOTES
Coles removed. Urine clear and yellow. Pt given urinal and educated about the need to urinate within six hours.

## 2017-03-28 NOTE — OP NOTES
[facility]   OPERATIVE REPORT       Name:  Theo Garcia   MR#:  [id number]   :  1945   Account #:  [de-identified]   Date of Adm:  2017       PREOPERATIVE DIAGNOSIS: Left renal mass. POSTOPERATIVE DIAGNOSIS: Left renal mass. PROCEDURE   1. Left hand-assisted laparoscopic radical nephrectomy. 2. Cystoscopy with Coles catheter placement. SURGEON: Armaan Bender DO    ASSISTANT: Neil Wheeler. Nadia Moses MD    ANESTHESIA: General.    ESTIMATED BLOOD LOSS: 50 mL. CLINICAL HISTORY: This is a 78-year-old gentleman who is status   post robotic prostatectomy, on 2017, for a Rhianna 7   prostate cancer. His preoperative staging films revealed a large   left lower pole renal mass. He presents today for treatment of   this mass. All risks, benefits, and alternatives to the   procedure have been reviewed, and he is willing to proceed at   this time. DESCRIPTION OF OPERATIVE PROCEDURE: Patient consent was   obtained. The patient was brought back to the operating room, at   which time he was placed in the supine position. After the   uneventful induction of general anesthesia, his genital area was   prepped and draped and a sterile field applied. A flexible   cystoscope was inserted into the urethra and advanced into the   bladder under direct vision. Previous urethrovesical anastomosis   appeared well-healed with minimal scar tissue noted. The scope   easily passed into the bladder. The bladder was systematically   surveyed. No abnormalities were seen. A guidewire was then   passed into the bladder and a 16-[place] tip catheter was   passed over the wire into the bladder. The patient was then   placed in a right lateral decubitus position. All pressure   points were carefully padded and the patient was taped to the   table. The patient's abdominal area was then prepped and draped   and a sterile field applied. A small left upper quadrant   subcostal incision was made. This was carried down to the   peritoneal cavity without event. The GelPort was then assembled   through this incision and the abdomen insufflated with CO2. Two   separate 12 mm port sites were then placed in the left lower   quadrant under direct vision. The white line of Toldt was   incised and the colon was reflected medially. I dissected out   the inferior pole of the kidney. The patient had a significant   amount of perirenal fat surrounding the kidney making dissection   slow and tedious. Eventually, I was able to identify the ureter   medial to the psoas muscle. This was carefully preserved. Dissection then carried proximally towards the renal hilum. This   was approached both medially, as well as laterally, after   flipping the kidney, given the difficulty with visualization due   to the amount of perirenal fat. All surrounding kidney attachments were taken down using blunt dissection and the harmonic scalpel. Eventually, I was able to   dissect down to a single renal artery and vein. These were   dissected free of each other. At this point, the perirenal fat was removed from the lower half   of the kidney. This was also a slow and tedious process given   the adhesive nature of the fat to the renal parenchyma. The   patient was noted to have multiple renal cysts in the lower   pole. I dissected out the mass of interest, while keeping the peritumor  fat attached to the mass. This mass appeared to be both cystic with   solid features. It was very difficult to determine the   boundaries of the mass compared to the renal parenchyma given   the amount of cystic changes noted in the renal parenchyma. Laparoscopic ultrasound was then performed at this time showing a combined   cystic and solid mass with multiple cysts present in the renal parenchyma. This mass measured over 5 cm.  It was   difficult to determine the superior margin of the lower pole mass compared   to the collecting system using ultrasound guidance. Visually, a clear distinction between tumor and kidney was not present. At this point, I scrubbed out of the   case and discussed my findings with the family. I explained that   the mass appeared to be quite large and possibly invasive and I   was concerned with leaving a positive margin or leaving   the patient with increased risk of morbidity and possible renal atrophy if a heminephrectomy was performed. The family and I elected to proceed with a radical nephrectomy given these   findings. I then scrubbed back into the case. The renal artery   was transected using an endovascular stapler. The renal vein was   then transected in succession. The ureter was transected   using the endovascular stapler. The kidney was then placed   within an EndoCatch 2 bag and removed through the GelPort   incision. The renal fossa was then carefully surveyed. There was   no active bleeding noted. There was no visible inadvertent injury to the   bowel or spleen. No bleeding was noted at lower abdominal   pressures. I did elect to place Surgicel over the renal hilum. The intraabdominal pressure was then raised and the two 12 mm   ports were closed using 0 Vicryl on an Endoclose device under   direct vision. All ports were then removed. The GelPort was then   closed using a 0 Vicryl to close the peritoneal and   transversalis fascial layers. This was closed in a running   fashion. A #1 PDS was used to close the external oblique and   external oblique fascial layers. This was also closed in a   running fashion. All skin incisions were then closed using skin   staples. The patient tolerated the procedure well. Findings were   discussed with the family in detail.         Cherie Bosworth STERRETT, DO SPS / CR   D:  03/27/2017   13:27   T:  03/28/2017   08:16   Job #:  [id number]

## 2017-03-29 LAB
ANION GAP BLD CALC-SCNC: 9 MMOL/L (ref 7–16)
BUN SERPL-MCNC: 32 MG/DL (ref 8–23)
CALCIUM SERPL-MCNC: 8.2 MG/DL (ref 8.3–10.4)
CHLORIDE SERPL-SCNC: 98 MMOL/L (ref 98–107)
CO2 SERPL-SCNC: 26 MMOL/L (ref 21–32)
CREAT SERPL-MCNC: 3 MG/DL (ref 0.8–1.5)
GLUCOSE SERPL-MCNC: 98 MG/DL (ref 65–100)
HCT VFR BLD AUTO: 28.3 % (ref 41.1–50.3)
HGB BLD-MCNC: 9.1 G/DL (ref 13.6–17.2)
POTASSIUM SERPL-SCNC: 3.4 MMOL/L (ref 3.5–5.1)
SODIUM SERPL-SCNC: 133 MMOL/L (ref 136–145)

## 2017-03-29 PROCEDURE — 74011000258 HC RX REV CODE- 258: Performed by: UROLOGY

## 2017-03-29 PROCEDURE — 80048 BASIC METABOLIC PNL TOTAL CA: CPT | Performed by: UROLOGY

## 2017-03-29 PROCEDURE — 74011250637 HC RX REV CODE- 250/637: Performed by: UROLOGY

## 2017-03-29 PROCEDURE — 65270000029 HC RM PRIVATE

## 2017-03-29 PROCEDURE — 85018 HEMOGLOBIN: CPT | Performed by: UROLOGY

## 2017-03-29 PROCEDURE — 74011636637 HC RX REV CODE- 636/637: Performed by: UROLOGY

## 2017-03-29 PROCEDURE — 36415 COLL VENOUS BLD VENIPUNCTURE: CPT | Performed by: UROLOGY

## 2017-03-29 RX ADMIN — ACETAMINOPHEN 650 MG: 325 TABLET ORAL at 12:37

## 2017-03-29 RX ADMIN — DILTIAZEM HYDROCHLORIDE 180 MG: 180 CAPSULE, COATED, EXTENDED RELEASE ORAL at 18:06

## 2017-03-29 RX ADMIN — PANTOPRAZOLE SODIUM 40 MG: 40 TABLET, DELAYED RELEASE ORAL at 09:16

## 2017-03-29 RX ADMIN — DOCUSATE SODIUM 100 MG: 100 CAPSULE, LIQUID FILLED ORAL at 09:15

## 2017-03-29 RX ADMIN — FLECAINIDE ACETATE 50 MG: 100 TABLET ORAL at 20:50

## 2017-03-29 RX ADMIN — LOSARTAN POTASSIUM: 50 TABLET ORAL at 09:15

## 2017-03-29 RX ADMIN — MESALAMINE 800 MG: 800 TABLET, DELAYED RELEASE ORAL at 18:06

## 2017-03-29 RX ADMIN — ATORVASTATIN CALCIUM 40 MG: 40 TABLET, FILM COATED ORAL at 07:51

## 2017-03-29 RX ADMIN — HYDROCODONE BITARTRATE AND ACETAMINOPHEN 1 TABLET: 7.5; 325 TABLET ORAL at 09:13

## 2017-03-29 RX ADMIN — HYDROCODONE BITARTRATE AND ACETAMINOPHEN 1 TABLET: 7.5; 325 TABLET ORAL at 15:48

## 2017-03-29 RX ADMIN — MESALAMINE 800 MG: 800 TABLET, DELAYED RELEASE ORAL at 09:16

## 2017-03-29 RX ADMIN — DEXTROSE MONOHYDRATE AND SODIUM CHLORIDE 125 ML/HR: 5; .45 INJECTION, SOLUTION INTRAVENOUS at 04:45

## 2017-03-29 RX ADMIN — DOCUSATE SODIUM 100 MG: 100 CAPSULE, LIQUID FILLED ORAL at 18:06

## 2017-03-29 RX ADMIN — PREDNISONE 5 MG: 5 TABLET ORAL at 09:17

## 2017-03-29 RX ADMIN — FLECAINIDE ACETATE 50 MG: 100 TABLET ORAL at 09:17

## 2017-03-29 RX ADMIN — DULOXETINE HYDROCHLORIDE 30 MG: 30 CAPSULE, DELAYED RELEASE ORAL at 18:05

## 2017-03-29 NOTE — PROGRESS NOTES
Doing well. Small amt of flatus. Pain controlled. Ambulating. Voiding. AVSS  Abd soft, NT, ND. Ports c/d/i  AM labs pending  Path reviewed. RCC (papillary x 3)  S/P L HALN POD#2  Full liquids. Ambulate. Await labs.

## 2017-03-29 NOTE — PROGRESS NOTES
Pt is voiding since cath was removed. Requesting to advance diet, active bowel sounds. Clear liquid diet tolerated. Stated he has passed a \"little bit of gas. \"  Complaining of a sore throat and hiccups.

## 2017-03-30 VITALS
OXYGEN SATURATION: 94 % | HEART RATE: 69 BPM | DIASTOLIC BLOOD PRESSURE: 70 MMHG | SYSTOLIC BLOOD PRESSURE: 140 MMHG | TEMPERATURE: 97.7 F | RESPIRATION RATE: 17 BRPM

## 2017-03-30 LAB
ABO + RH BLD: NORMAL
ANION GAP BLD CALC-SCNC: 9 MMOL/L (ref 7–16)
BLD PROD TYP BPU: NORMAL
BLD PROD TYP BPU: NORMAL
BLOOD GROUP ANTIBODIES SERPL: NORMAL
BPU ID: NORMAL
BPU ID: NORMAL
BUN SERPL-MCNC: 31 MG/DL (ref 8–23)
CALCIUM SERPL-MCNC: 8.9 MG/DL (ref 8.3–10.4)
CHLORIDE SERPL-SCNC: 98 MMOL/L (ref 98–107)
CO2 SERPL-SCNC: 27 MMOL/L (ref 21–32)
CREAT SERPL-MCNC: 2.97 MG/DL (ref 0.8–1.5)
CROSSMATCH RESULT,%XM: NORMAL
CROSSMATCH RESULT,%XM: NORMAL
GLUCOSE SERPL-MCNC: 113 MG/DL (ref 65–100)
POTASSIUM SERPL-SCNC: 3.4 MMOL/L (ref 3.5–5.1)
SODIUM SERPL-SCNC: 134 MMOL/L (ref 136–145)
SPECIMEN EXP DATE BLD: NORMAL
STATUS OF UNIT,%ST: NORMAL
STATUS OF UNIT,%ST: NORMAL
UNIT DIVISION, %UDIV: 0
UNIT DIVISION, %UDIV: 0

## 2017-03-30 PROCEDURE — 74011636637 HC RX REV CODE- 636/637: Performed by: UROLOGY

## 2017-03-30 PROCEDURE — 80048 BASIC METABOLIC PNL TOTAL CA: CPT | Performed by: UROLOGY

## 2017-03-30 PROCEDURE — 74011250637 HC RX REV CODE- 250/637: Performed by: UROLOGY

## 2017-03-30 PROCEDURE — 36415 COLL VENOUS BLD VENIPUNCTURE: CPT | Performed by: UROLOGY

## 2017-03-30 RX ORDER — DOCUSATE SODIUM 100 MG/1
100 CAPSULE, LIQUID FILLED ORAL 2 TIMES DAILY
Qty: 60 CAP | Refills: 2 | Status: SHIPPED | OUTPATIENT
Start: 2017-03-30 | End: 2017-06-28

## 2017-03-30 RX ORDER — HYDROCODONE BITARTRATE AND ACETAMINOPHEN 7.5; 325 MG/1; MG/1
1 TABLET ORAL
Qty: 25 TAB | Refills: 0 | Status: SHIPPED | OUTPATIENT
Start: 2017-03-30 | End: 2017-04-13 | Stop reason: SDUPTHER

## 2017-03-30 RX ADMIN — ATORVASTATIN CALCIUM 40 MG: 40 TABLET, FILM COATED ORAL at 08:33

## 2017-03-30 RX ADMIN — FLECAINIDE ACETATE 50 MG: 100 TABLET ORAL at 09:17

## 2017-03-30 RX ADMIN — DOCUSATE SODIUM 100 MG: 100 CAPSULE, LIQUID FILLED ORAL at 09:17

## 2017-03-30 RX ADMIN — MESALAMINE 800 MG: 800 TABLET, DELAYED RELEASE ORAL at 09:15

## 2017-03-30 RX ADMIN — PREDNISONE 5 MG: 5 TABLET ORAL at 09:16

## 2017-03-30 RX ADMIN — HYDROCODONE BITARTRATE AND ACETAMINOPHEN 1 TABLET: 7.5; 325 TABLET ORAL at 03:39

## 2017-03-30 RX ADMIN — LOSARTAN POTASSIUM: 50 TABLET ORAL at 09:14

## 2017-03-30 RX ADMIN — HYDROCODONE BITARTRATE AND ACETAMINOPHEN 1 TABLET: 7.5; 325 TABLET ORAL at 09:15

## 2017-03-30 RX ADMIN — PANTOPRAZOLE SODIUM 40 MG: 40 TABLET, DELAYED RELEASE ORAL at 09:16

## 2017-03-30 NOTE — PROGRESS NOTES
Doing well. Passing flatus. Small bm last night. Ambulating well. AVSS  Abd soft, NT, ND. Ports c/d/i  BMP pending  UOP stable  Path reviewed. Papillary RCC x 3. S/P L HALN POD#3  Advance diet  Await BMP. Home later if renal function stable.

## 2017-03-30 NOTE — PROGRESS NOTES
Discharge instructions and prescriptions given and reviewed with pt and wife, verbalizes understanding, medication side effect sheet reviewed with pt, pt discharged home with family.

## 2017-03-30 NOTE — DISCHARGE INSTRUCTIONS
DISCHARGE SUMMARY from Nurse    The following personal items are in your possession at time of discharge:    Dental Appliances: Uppers           Jewelry: None  Clothing: Shirt, Pants, Footwear, Undergarments  Other Valuables: None             PATIENT INSTRUCTIONS:    After general anesthesia or intravenous sedation, for 24 hours or while taking prescription Narcotics:  · Limit your activities  · Do not drive and operate hazardous machinery  · Do not make important personal or business decisions  · Do  not drink alcoholic beverages  · If you have not urinated within 8 hours after discharge, please contact your surgeon on call. Report the following to your surgeon:  · Excessive pain, swelling, redness or odor of or around the surgical area  · Temperature over 100.5  · Nausea and vomiting lasting longer than 4 hours or if unable to take medications  · Any signs of decreased circulation or nerve impairment to extremity: change in color, persistent  numbness, tingling, coldness or increase pain  · Any questions        What to do at Home:  Recommended activity: No lifting, Driving, or Strenuous exercise for 2 weeks. If you experience any of the following symptoms increased pain, fever greater than 101, wound drainage, difficulty urinating, or nausea/vomiting, please follow up with Dr. Carmin Hamman. *  Please give a list of your current medications to your Primary Care Provider. *  Please update this list whenever your medications are discontinued, doses are      changed, or new medications (including over-the-counter products) are added. *  Please carry medication information at all times in case of emergency situations. These are general instructions for a healthy lifestyle:    No smoking/ No tobacco products/ Avoid exposure to second hand smoke    Surgeon General's Warning:  Quitting smoking now greatly reduces serious risk to your health.     Obesity, smoking, and sedentary lifestyle greatly increases your risk for illness    A healthy diet, regular physical exercise & weight monitoring are important for maintaining a healthy lifestyle    You may be retaining fluid if you have a history of heart failure or if you experience any of the following symptoms:  Weight gain of 3 pounds or more overnight or 5 pounds in a week, increased swelling in our hands or feet or shortness of breath while lying flat in bed. Please call your doctor as soon as you notice any of these symptoms; do not wait until your next office visit. Recognize signs and symptoms of STROKE:    F-face looks uneven    A-arms unable to move or move unevenly    S-speech slurred or non-existent    T-time-call 911 as soon as signs and symptoms begin-DO NOT go       Back to bed or wait to see if you get better-TIME IS BRAIN. Warning Signs of HEART ATTACK     Call 911 if you have these symptoms:   Chest discomfort. Most heart attacks involve discomfort in the center of the chest that lasts more than a few minutes, or that goes away and comes back. It can feel like uncomfortable pressure, squeezing, fullness, or pain.  Discomfort in other areas of the upper body. Symptoms can include pain or discomfort in one or both arms, the back, neck, jaw, or stomach.  Shortness of breath with or without chest discomfort.  Other signs may include breaking out in a cold sweat, nausea, or lightheadedness. Don't wait more than five minutes to call 911 - MINUTES MATTER! Fast action can save your life. Calling 911 is almost always the fastest way to get lifesaving treatment. Emergency Medical Services staff can begin treatment when they arrive -- up to an hour sooner than if someone gets to the hospital by car. The discharge information has been reviewed with the patient. The patient verbalized understanding.     Discharge medications reviewed with the patient and appropriate educational materials and side effects teaching were provided.

## 2017-03-31 ENCOUNTER — HOSPITAL ENCOUNTER (OUTPATIENT)
Dept: PHYSICAL THERAPY | Age: 72
Discharge: HOME | End: 2017-03-31
Payer: MEDICARE

## 2017-03-31 NOTE — PROGRESS NOTES
[facility] at [address], [address] [place] [address],[address] [place], [address]  Phone: [phone]   Fax: [phone]    Pt cancelled today's physical therapy appointment d/t recent surgery    Violet Webb, MPT

## 2017-04-07 ENCOUNTER — APPOINTMENT (OUTPATIENT)
Dept: PHYSICAL THERAPY | Age: 72
End: 2017-04-07

## 2017-04-13 PROBLEM — I48.0 PAROXYSMAL ATRIAL FIBRILLATION (HCC): Status: ACTIVE | Noted: 2017-04-13

## 2017-04-13 PROBLEM — L40.50 PSORIATIC ARTHRITIS (HCC): Status: ACTIVE | Noted: 2017-04-13

## 2017-04-13 PROBLEM — K51.90 ULCERATIVE COLITIS (HCC): Status: ACTIVE | Noted: 2017-04-13

## 2017-04-25 ENCOUNTER — HOSPITAL ENCOUNTER (OUTPATIENT)
Dept: CT IMAGING | Age: 72
Discharge: HOME | End: 2017-04-25
Attending: INTERNAL MEDICINE
Payer: MEDICARE

## 2017-04-25 DIAGNOSIS — R91.1 PULMONARY NODULE: ICD-10-CM

## 2017-04-25 PROCEDURE — 71250 CT THORAX DX C-: CPT

## 2017-10-17 ENCOUNTER — HOSPITAL ENCOUNTER (OUTPATIENT)
Dept: ULTRASOUND IMAGING | Age: 72
Discharge: HOME | End: 2017-10-17
Attending: UROLOGY
Payer: MEDICARE

## 2017-10-17 DIAGNOSIS — C64.2 RENAL CANCER, LEFT (HCC): ICD-10-CM

## 2017-10-17 PROCEDURE — 76770 US EXAM ABDO BACK WALL COMP: CPT

## 2017-10-20 ENCOUNTER — HOSPITAL ENCOUNTER (OUTPATIENT)
Dept: CT IMAGING | Age: 72
Discharge: HOME | End: 2017-10-20
Attending: INTERNAL MEDICINE
Payer: MEDICARE

## 2017-10-20 DIAGNOSIS — R91.1 PULMONARY NODULE: ICD-10-CM

## 2017-10-20 PROCEDURE — 71250 CT THORAX DX C-: CPT

## 2017-10-26 PROBLEM — I10 HIGH BLOOD PRESSURE: Status: ACTIVE | Noted: 2017-10-26

## 2017-10-26 PROBLEM — E78.00 HIGH CHOLESTEROL: Status: ACTIVE | Noted: 2017-10-26

## 2017-11-05 ENCOUNTER — APPOINTMENT (OUTPATIENT)
Dept: GENERAL RADIOLOGY | Age: 72
DRG: 470 | End: 2017-11-05
Attending: EMERGENCY MEDICINE
Payer: MEDICARE

## 2017-11-05 ENCOUNTER — APPOINTMENT (OUTPATIENT)
Dept: GENERAL RADIOLOGY | Age: 72
DRG: 470 | End: 2017-11-05
Attending: ORTHOPAEDIC SURGERY
Payer: MEDICARE

## 2017-11-05 ENCOUNTER — HOSPITAL ENCOUNTER (EMERGENCY)
Age: 72
Discharge: ANOTHER HOSPITAL | DRG: 470 | End: 2017-11-05
Attending: EMERGENCY MEDICINE
Payer: MEDICARE

## 2017-11-05 ENCOUNTER — ANESTHESIA EVENT (OUTPATIENT)
Dept: SURGERY | Age: 72
DRG: 470 | End: 2017-11-05
Payer: MEDICARE

## 2017-11-05 ENCOUNTER — ANESTHESIA (OUTPATIENT)
Dept: SURGERY | Age: 72
DRG: 470 | End: 2017-11-05
Payer: MEDICARE

## 2017-11-05 ENCOUNTER — HOSPITAL ENCOUNTER (INPATIENT)
Age: 72
LOS: 3 days | Discharge: NURSING, REHAB OR LONG-TERM CARE | DRG: 470 | End: 2017-11-08
Attending: FAMILY MEDICINE | Admitting: FAMILY MEDICINE
Payer: MEDICARE

## 2017-11-05 VITALS
RESPIRATION RATE: 20 BRPM | TEMPERATURE: 98 F | SYSTOLIC BLOOD PRESSURE: 138 MMHG | HEIGHT: 70 IN | BODY MASS INDEX: 26.48 KG/M2 | DIASTOLIC BLOOD PRESSURE: 64 MMHG | WEIGHT: 185 LBS | HEART RATE: 76 BPM | OXYGEN SATURATION: 97 %

## 2017-11-05 DIAGNOSIS — S72.002D CLOSED FRACTURE OF LEFT HIP WITH ROUTINE HEALING, SUBSEQUENT ENCOUNTER: ICD-10-CM

## 2017-11-05 DIAGNOSIS — R52 PAIN: ICD-10-CM

## 2017-11-05 DIAGNOSIS — S72.002A CLOSED FRACTURE OF NECK OF LEFT FEMUR, INITIAL ENCOUNTER (HCC): Primary | ICD-10-CM

## 2017-11-05 PROBLEM — S72.001A CLOSED RIGHT HIP FRACTURE (HCC): Status: ACTIVE | Noted: 2017-11-05

## 2017-11-05 PROBLEM — N18.9 CHRONIC KIDNEY DISEASE: Status: ACTIVE | Noted: 2017-11-05

## 2017-11-05 LAB
ABO + RH BLD: NORMAL
AMORPH CRY URNS QL MICRO: ABNORMAL
ANION GAP SERPL CALC-SCNC: 10 MMOL/L (ref 7–16)
APPEARANCE UR: ABNORMAL
APTT PPP: 24.4 SEC (ref 23.5–31.7)
BACTERIA SPEC CULT: NORMAL
BACTERIA URNS QL MICRO: 0 /HPF
BILIRUB UR QL: NEGATIVE
BLOOD GROUP ANTIBODIES SERPL: NORMAL
BUN SERPL-MCNC: 51 MG/DL (ref 8–23)
CALCIUM SERPL-MCNC: 8.2 MG/DL (ref 8.3–10.4)
CALCIUM SERPL-MCNC: 8.9 MG/DL (ref 8.3–10.4)
CHLORIDE SERPL-SCNC: 104 MMOL/L (ref 98–107)
CO2 SERPL-SCNC: 23 MMOL/L (ref 21–32)
COLOR UR: YELLOW
CREAT SERPL-MCNC: 3.1 MG/DL (ref 0.8–1.5)
EPI CELLS #/AREA URNS HPF: ABNORMAL /HPF
ERYTHROCYTE [DISTWIDTH] IN BLOOD BY AUTOMATED COUNT: 14.2 % (ref 11.9–14.6)
GLUCOSE SERPL-MCNC: 104 MG/DL (ref 65–100)
GLUCOSE UR STRIP.AUTO-MCNC: NEGATIVE MG/DL
HCT VFR BLD AUTO: 32.1 % (ref 41.1–50.3)
HGB BLD-MCNC: 10.9 G/DL (ref 13.6–17.2)
HGB UR QL STRIP: ABNORMAL
INR PPP: 1 (ref 0.9–1.2)
KETONES UR QL STRIP.AUTO: NEGATIVE MG/DL
LEUKOCYTE ESTERASE UR QL STRIP.AUTO: NEGATIVE
MAGNESIUM SERPL-MCNC: 2.3 MG/DL (ref 1.8–2.4)
MCH RBC QN AUTO: 32.4 PG (ref 26.1–32.9)
MCHC RBC AUTO-ENTMCNC: 34 G/DL (ref 31.4–35)
MCV RBC AUTO: 95.5 FL (ref 79.6–97.8)
NITRITE UR QL STRIP.AUTO: NEGATIVE
PH UR STRIP: 5.5 [PH] (ref 5–9)
PHOSPHATE SERPL-MCNC: 2.9 MG/DL (ref 2.3–3.7)
PLATELET # BLD AUTO: 221 K/UL (ref 150–450)
PMV BLD AUTO: 9.6 FL (ref 10.8–14.1)
POTASSIUM SERPL-SCNC: 4.8 MMOL/L (ref 3.5–5.1)
PREALB SERPL-MCNC: 33 MG/DL (ref 18–35.7)
PROT UR STRIP-MCNC: ABNORMAL MG/DL
PROTHROMBIN TIME: 11 SEC (ref 9.6–12)
PTH-INTACT SERPL-MCNC: 203.6 PG/ML (ref 14–72)
RBC # BLD AUTO: 3.36 M/UL (ref 4.23–5.67)
RBC #/AREA URNS HPF: ABNORMAL /HPF
SERVICE CMNT-IMP: NORMAL
SODIUM SERPL-SCNC: 137 MMOL/L (ref 136–145)
SP GR UR REFRACTOMETRY: 1.01 (ref 1–1.02)
SPECIMEN EXP DATE BLD: NORMAL
UROBILINOGEN UR QL STRIP.AUTO: 0.2 EU/DL (ref 0.2–1)
WBC # BLD AUTO: 11.9 K/UL (ref 4.3–11.1)
WBC URNS QL MICRO: ABNORMAL /HPF

## 2017-11-05 PROCEDURE — 65270000029 HC RM PRIVATE

## 2017-11-05 PROCEDURE — C1776 JOINT DEVICE (IMPLANTABLE): HCPCS | Performed by: ORTHOPAEDIC SURGERY

## 2017-11-05 PROCEDURE — 36415 COLL VENOUS BLD VENIPUNCTURE: CPT | Performed by: HOSPITALIST

## 2017-11-05 PROCEDURE — 76010000161 HC OR TIME 1 TO 1.5 HR INTENSV-TIER 1: Performed by: ORTHOPAEDIC SURGERY

## 2017-11-05 PROCEDURE — 77030020143 HC AIRWY LARYN INTUB CGAS -A: Performed by: ANESTHESIOLOGY

## 2017-11-05 PROCEDURE — 77030011640 HC PAD GRND REM COVD -A: Performed by: ORTHOPAEDIC SURGERY

## 2017-11-05 PROCEDURE — 73502 X-RAY EXAM HIP UNI 2-3 VIEWS: CPT

## 2017-11-05 PROCEDURE — 86580 TB INTRADERMAL TEST: CPT | Performed by: HOSPITALIST

## 2017-11-05 PROCEDURE — 97162 PT EVAL MOD COMPLEX 30 MIN: CPT

## 2017-11-05 PROCEDURE — 77030029883 HC RETRV SUT ARTHSCP HOFFE BEAT -B: Performed by: ORTHOPAEDIC SURGERY

## 2017-11-05 PROCEDURE — 87641 MR-STAPH DNA AMP PROBE: CPT | Performed by: HOSPITALIST

## 2017-11-05 PROCEDURE — 77030013727 HC IRR FAN PULSVC ZIMM -B: Performed by: ORTHOPAEDIC SURGERY

## 2017-11-05 PROCEDURE — 77030008467 HC STPLR SKN COVD -B: Performed by: ORTHOPAEDIC SURGERY

## 2017-11-05 PROCEDURE — 0SRS0JZ REPLACEMENT OF LEFT HIP JOINT, FEMORAL SURFACE WITH SYNTHETIC SUBSTITUTE, OPEN APPROACH: ICD-10-PCS | Performed by: ORTHOPAEDIC SURGERY

## 2017-11-05 PROCEDURE — 74011250637 HC RX REV CODE- 250/637: Performed by: ORTHOPAEDIC SURGERY

## 2017-11-05 PROCEDURE — 74011000258 HC RX REV CODE- 258: Performed by: ORTHOPAEDIC SURGERY

## 2017-11-05 PROCEDURE — 76060000034 HC ANESTHESIA 1.5 TO 2 HR: Performed by: ORTHOPAEDIC SURGERY

## 2017-11-05 PROCEDURE — 83970 ASSAY OF PARATHORMONE: CPT | Performed by: ORTHOPAEDIC SURGERY

## 2017-11-05 PROCEDURE — 74011250636 HC RX REV CODE- 250/636: Performed by: ANESTHESIOLOGY

## 2017-11-05 PROCEDURE — 83735 ASSAY OF MAGNESIUM: CPT | Performed by: HOSPITALIST

## 2017-11-05 PROCEDURE — 74011250636 HC RX REV CODE- 250/636

## 2017-11-05 PROCEDURE — 74011000302 HC RX REV CODE- 302: Performed by: HOSPITALIST

## 2017-11-05 PROCEDURE — 74011250636 HC RX REV CODE- 250/636: Performed by: ORTHOPAEDIC SURGERY

## 2017-11-05 PROCEDURE — 86900 BLOOD TYPING SEROLOGIC ABO: CPT | Performed by: HOSPITALIST

## 2017-11-05 PROCEDURE — 82306 VITAMIN D 25 HYDROXY: CPT | Performed by: HOSPITALIST

## 2017-11-05 PROCEDURE — 77030010507 HC ADH SKN DERMBND J&J -B: Performed by: ORTHOPAEDIC SURGERY

## 2017-11-05 PROCEDURE — 77030002933 HC SUT MCRYL J&J -A: Performed by: ORTHOPAEDIC SURGERY

## 2017-11-05 PROCEDURE — 77030002937 HC SUT MERS J&J -B: Performed by: ORTHOPAEDIC SURGERY

## 2017-11-05 PROCEDURE — 85730 THROMBOPLASTIN TIME PARTIAL: CPT | Performed by: HOSPITALIST

## 2017-11-05 PROCEDURE — 77030002966 HC SUT PDS J&J -A: Performed by: ORTHOPAEDIC SURGERY

## 2017-11-05 PROCEDURE — 74011000250 HC RX REV CODE- 250

## 2017-11-05 PROCEDURE — L1830 KO IMMOB CANVAS LONG PRE OTS: HCPCS | Performed by: ORTHOPAEDIC SURGERY

## 2017-11-05 PROCEDURE — 84134 ASSAY OF PREALBUMIN: CPT | Performed by: HOSPITALIST

## 2017-11-05 PROCEDURE — 76210000017 HC OR PH I REC 1.5 TO 2 HR: Performed by: ORTHOPAEDIC SURGERY

## 2017-11-05 PROCEDURE — 81001 URINALYSIS AUTO W/SCOPE: CPT | Performed by: ORTHOPAEDIC SURGERY

## 2017-11-05 PROCEDURE — 77030018836 HC SOL IRR NACL ICUM -A: Performed by: ORTHOPAEDIC SURGERY

## 2017-11-05 PROCEDURE — 77030018673: Performed by: ORTHOPAEDIC SURGERY

## 2017-11-05 PROCEDURE — 84100 ASSAY OF PHOSPHORUS: CPT | Performed by: HOSPITALIST

## 2017-11-05 PROCEDURE — 99221 1ST HOSP IP/OBS SF/LOW 40: CPT | Performed by: PHYSICAL MEDICINE & REHABILITATION

## 2017-11-05 PROCEDURE — 77030021907 HC KT URIN FOL O&M -A

## 2017-11-05 PROCEDURE — 77030006835 HC BLD SAW SAG STRY -B: Performed by: ORTHOPAEDIC SURGERY

## 2017-11-05 PROCEDURE — 74011250637 HC RX REV CODE- 250/637: Performed by: HOSPITALIST

## 2017-11-05 DEVICE — STEM FEM SZ 14 L140MM NK L38.5MM 42MM STD OFFSET 135DEG HIP: Type: IMPLANTABLE DEVICE | Site: HIP | Status: FUNCTIONAL

## 2017-11-05 DEVICE — HEAD FEM SLF-CENTER 52X28 BLU --: Type: IMPLANTABLE DEVICE | Site: HIP | Status: FUNCTIONAL

## 2017-11-05 DEVICE — HEAD FEM DIA28MM +5MM OFFSET STD 12/14 TAPR ARTC EZ HIP MTL: Type: IMPLANTABLE DEVICE | Site: HIP | Status: FUNCTIONAL

## 2017-11-05 RX ORDER — SODIUM CHLORIDE, SODIUM LACTATE, POTASSIUM CHLORIDE, CALCIUM CHLORIDE 600; 310; 30; 20 MG/100ML; MG/100ML; MG/100ML; MG/100ML
75 INJECTION, SOLUTION INTRAVENOUS CONTINUOUS
Status: DISCONTINUED | OUTPATIENT
Start: 2017-11-05 | End: 2017-11-08 | Stop reason: HOSPADM

## 2017-11-05 RX ORDER — SODIUM CHLORIDE 0.9 % (FLUSH) 0.9 %
5-10 SYRINGE (ML) INJECTION AS NEEDED
Status: DISCONTINUED | OUTPATIENT
Start: 2017-11-05 | End: 2017-11-05 | Stop reason: HOSPADM

## 2017-11-05 RX ORDER — OXYCODONE HYDROCHLORIDE 5 MG/1
5 TABLET ORAL
Status: DISCONTINUED | OUTPATIENT
Start: 2017-11-05 | End: 2017-11-08 | Stop reason: HOSPADM

## 2017-11-05 RX ORDER — LIDOCAINE HYDROCHLORIDE 10 MG/ML
0.1 INJECTION INFILTRATION; PERINEURAL AS NEEDED
Status: DISCONTINUED | OUTPATIENT
Start: 2017-11-05 | End: 2017-11-06 | Stop reason: HOSPADM

## 2017-11-05 RX ORDER — SODIUM CHLORIDE, SODIUM LACTATE, POTASSIUM CHLORIDE, CALCIUM CHLORIDE 600; 310; 30; 20 MG/100ML; MG/100ML; MG/100ML; MG/100ML
75 INJECTION, SOLUTION INTRAVENOUS
Status: COMPLETED | OUTPATIENT
Start: 2017-11-05 | End: 2017-11-05

## 2017-11-05 RX ORDER — ONDANSETRON 2 MG/ML
4 INJECTION INTRAMUSCULAR; INTRAVENOUS
Status: DISCONTINUED | OUTPATIENT
Start: 2017-11-05 | End: 2017-11-05 | Stop reason: HOSPADM

## 2017-11-05 RX ORDER — SODIUM CHLORIDE, SODIUM LACTATE, POTASSIUM CHLORIDE, CALCIUM CHLORIDE 600; 310; 30; 20 MG/100ML; MG/100ML; MG/100ML; MG/100ML
50 INJECTION, SOLUTION INTRAVENOUS CONTINUOUS
Status: DISCONTINUED | OUTPATIENT
Start: 2017-11-05 | End: 2017-11-05 | Stop reason: HOSPADM

## 2017-11-05 RX ORDER — TRAMADOL HYDROCHLORIDE 50 MG/1
50 TABLET ORAL
Status: DISCONTINUED | OUTPATIENT
Start: 2017-11-05 | End: 2017-11-08 | Stop reason: HOSPADM

## 2017-11-05 RX ORDER — OXYCODONE HYDROCHLORIDE 5 MG/1
5 TABLET ORAL
Status: DISCONTINUED | OUTPATIENT
Start: 2017-11-05 | End: 2017-11-05 | Stop reason: HOSPADM

## 2017-11-05 RX ORDER — ONDANSETRON 2 MG/ML
4 INJECTION INTRAMUSCULAR; INTRAVENOUS
Status: COMPLETED | OUTPATIENT
Start: 2017-11-05 | End: 2017-11-05

## 2017-11-05 RX ORDER — SODIUM CHLORIDE 0.9 % (FLUSH) 0.9 %
5-10 SYRINGE (ML) INJECTION EVERY 8 HOURS
Status: DISCONTINUED | OUTPATIENT
Start: 2017-11-05 | End: 2017-11-05

## 2017-11-05 RX ORDER — SODIUM CHLORIDE 0.9 % (FLUSH) 0.9 %
5-10 SYRINGE (ML) INJECTION AS NEEDED
Status: DISCONTINUED | OUTPATIENT
Start: 2017-11-05 | End: 2017-11-08 | Stop reason: HOSPADM

## 2017-11-05 RX ORDER — FENTANYL CITRATE 50 UG/ML
100 INJECTION, SOLUTION INTRAMUSCULAR; INTRAVENOUS ONCE
Status: ACTIVE | OUTPATIENT
Start: 2017-11-05 | End: 2017-11-06

## 2017-11-05 RX ORDER — MIDAZOLAM HYDROCHLORIDE 1 MG/ML
2 INJECTION, SOLUTION INTRAMUSCULAR; INTRAVENOUS
Status: DISCONTINUED | OUTPATIENT
Start: 2017-11-05 | End: 2017-11-06 | Stop reason: HOSPADM

## 2017-11-05 RX ORDER — LIDOCAINE HYDROCHLORIDE 20 MG/ML
INJECTION, SOLUTION EPIDURAL; INFILTRATION; INTRACAUDAL; PERINEURAL AS NEEDED
Status: DISCONTINUED | OUTPATIENT
Start: 2017-11-05 | End: 2017-11-05 | Stop reason: HOSPADM

## 2017-11-05 RX ORDER — SODIUM CHLORIDE, SODIUM LACTATE, POTASSIUM CHLORIDE, CALCIUM CHLORIDE 600; 310; 30; 20 MG/100ML; MG/100ML; MG/100ML; MG/100ML
INJECTION, SOLUTION INTRAVENOUS
Status: DISCONTINUED | OUTPATIENT
Start: 2017-11-05 | End: 2017-11-05 | Stop reason: HOSPADM

## 2017-11-05 RX ORDER — CEFAZOLIN SODIUM IN 0.9 % NACL 2 G/50 ML
INTRAVENOUS SOLUTION, PIGGYBACK (ML) INTRAVENOUS AS NEEDED
Status: DISCONTINUED | OUTPATIENT
Start: 2017-11-05 | End: 2017-11-05 | Stop reason: HOSPADM

## 2017-11-05 RX ORDER — MAG HYDROX/ALUMINUM HYD/SIMETH 200-200-20
30 SUSPENSION, ORAL (FINAL DOSE FORM) ORAL
Status: DISCONTINUED | OUTPATIENT
Start: 2017-11-05 | End: 2017-11-08 | Stop reason: HOSPADM

## 2017-11-05 RX ORDER — SODIUM CHLORIDE 0.9 % (FLUSH) 0.9 %
5-10 SYRINGE (ML) INJECTION AS NEEDED
Status: DISCONTINUED | OUTPATIENT
Start: 2017-11-05 | End: 2017-11-06 | Stop reason: HOSPADM

## 2017-11-05 RX ORDER — FERROUS SULFATE, DRIED 160(50) MG
1 TABLET, EXTENDED RELEASE ORAL
Status: DISCONTINUED | OUTPATIENT
Start: 2017-11-05 | End: 2017-11-08 | Stop reason: HOSPADM

## 2017-11-05 RX ORDER — SODIUM CHLORIDE 9 MG/ML
100 INJECTION, SOLUTION INTRAVENOUS CONTINUOUS
Status: DISCONTINUED | OUTPATIENT
Start: 2017-11-05 | End: 2017-11-05 | Stop reason: HOSPADM

## 2017-11-05 RX ORDER — CEFAZOLIN SODIUM IN 0.9 % NACL 2 G/50 ML
2 INTRAVENOUS SOLUTION, PIGGYBACK (ML) INTRAVENOUS
Status: DISCONTINUED | OUTPATIENT
Start: 2017-11-05 | End: 2017-11-05 | Stop reason: HOSPADM

## 2017-11-05 RX ORDER — MESALAMINE 800 MG/1
800 TABLET, DELAYED RELEASE ORAL 2 TIMES DAILY
Status: DISCONTINUED | OUTPATIENT
Start: 2017-11-05 | End: 2017-11-08 | Stop reason: HOSPADM

## 2017-11-05 RX ORDER — PREDNISONE 5 MG/1
5 TABLET ORAL
Status: DISCONTINUED | OUTPATIENT
Start: 2017-11-05 | End: 2017-11-08 | Stop reason: HOSPADM

## 2017-11-05 RX ORDER — ENOXAPARIN SODIUM 100 MG/ML
30 INJECTION SUBCUTANEOUS EVERY 24 HOURS
Status: DISCONTINUED | OUTPATIENT
Start: 2017-11-06 | End: 2017-11-08 | Stop reason: HOSPADM

## 2017-11-05 RX ORDER — CELECOXIB 200 MG/1
200 CAPSULE ORAL
Status: DISCONTINUED | OUTPATIENT
Start: 2017-11-05 | End: 2017-11-08 | Stop reason: HOSPADM

## 2017-11-05 RX ORDER — SODIUM CHLORIDE 9 MG/ML
2000 INJECTION, SOLUTION INTRAVENOUS CONTINUOUS
Status: DISCONTINUED | OUTPATIENT
Start: 2017-11-05 | End: 2017-11-08 | Stop reason: HOSPADM

## 2017-11-05 RX ORDER — HYDROMORPHONE HYDROCHLORIDE 1 MG/ML
0.5 INJECTION, SOLUTION INTRAMUSCULAR; INTRAVENOUS; SUBCUTANEOUS
Status: DISCONTINUED | OUTPATIENT
Start: 2017-11-05 | End: 2017-11-08 | Stop reason: HOSPADM

## 2017-11-05 RX ORDER — DULOXETIN HYDROCHLORIDE 30 MG/1
30 CAPSULE, DELAYED RELEASE ORAL
Status: DISCONTINUED | OUTPATIENT
Start: 2017-11-05 | End: 2017-11-08 | Stop reason: HOSPADM

## 2017-11-05 RX ORDER — FENTANYL CITRATE 50 UG/ML
INJECTION, SOLUTION INTRAMUSCULAR; INTRAVENOUS AS NEEDED
Status: DISCONTINUED | OUTPATIENT
Start: 2017-11-05 | End: 2017-11-05 | Stop reason: HOSPADM

## 2017-11-05 RX ORDER — SODIUM CHLORIDE, SODIUM LACTATE, POTASSIUM CHLORIDE, CALCIUM CHLORIDE 600; 310; 30; 20 MG/100ML; MG/100ML; MG/100ML; MG/100ML
75 INJECTION, SOLUTION INTRAVENOUS CONTINUOUS
Status: DISCONTINUED | OUTPATIENT
Start: 2017-11-05 | End: 2017-11-06 | Stop reason: HOSPADM

## 2017-11-05 RX ORDER — FLECAINIDE ACETATE 100 MG/1
50 TABLET ORAL EVERY 12 HOURS
Status: DISCONTINUED | OUTPATIENT
Start: 2017-11-05 | End: 2017-11-08 | Stop reason: HOSPADM

## 2017-11-05 RX ORDER — SODIUM CHLORIDE 0.9 % (FLUSH) 0.9 %
5-10 SYRINGE (ML) INJECTION EVERY 8 HOURS
Status: DISCONTINUED | OUTPATIENT
Start: 2017-11-05 | End: 2017-11-08 | Stop reason: HOSPADM

## 2017-11-05 RX ORDER — HYDROMORPHONE HYDROCHLORIDE 2 MG/ML
0.5 INJECTION, SOLUTION INTRAMUSCULAR; INTRAVENOUS; SUBCUTANEOUS
Status: DISCONTINUED | OUTPATIENT
Start: 2017-11-05 | End: 2017-11-05 | Stop reason: HOSPADM

## 2017-11-05 RX ORDER — ONDANSETRON 8 MG/1
8 TABLET, ORALLY DISINTEGRATING ORAL
Status: COMPLETED | OUTPATIENT
Start: 2017-11-05 | End: 2017-11-05

## 2017-11-05 RX ORDER — HYDRALAZINE HYDROCHLORIDE 20 MG/ML
10 INJECTION INTRAMUSCULAR; INTRAVENOUS
Status: DISCONTINUED | OUTPATIENT
Start: 2017-11-05 | End: 2017-11-08 | Stop reason: HOSPADM

## 2017-11-05 RX ORDER — DILTIAZEM HYDROCHLORIDE 180 MG/1
180 CAPSULE, COATED, EXTENDED RELEASE ORAL
Status: DISCONTINUED | OUTPATIENT
Start: 2017-11-05 | End: 2017-11-08 | Stop reason: HOSPADM

## 2017-11-05 RX ORDER — ERGOCALCIFEROL 1.25 MG/1
50000 CAPSULE ORAL
Status: DISCONTINUED | OUTPATIENT
Start: 2017-11-06 | End: 2017-11-08 | Stop reason: HOSPADM

## 2017-11-05 RX ORDER — HYDROCODONE BITARTRATE AND ACETAMINOPHEN 7.5; 325 MG/1; MG/1
2 TABLET ORAL
Status: DISCONTINUED | OUTPATIENT
Start: 2017-11-05 | End: 2017-11-08 | Stop reason: HOSPADM

## 2017-11-05 RX ORDER — HYDROMORPHONE HYDROCHLORIDE 1 MG/ML
1 INJECTION, SOLUTION INTRAMUSCULAR; INTRAVENOUS; SUBCUTANEOUS
Status: COMPLETED | OUTPATIENT
Start: 2017-11-05 | End: 2017-11-05

## 2017-11-05 RX ORDER — OXYCODONE HYDROCHLORIDE 5 MG/1
5 TABLET ORAL
Status: DISCONTINUED | OUTPATIENT
Start: 2017-11-05 | End: 2017-11-06 | Stop reason: HOSPADM

## 2017-11-05 RX ORDER — SODIUM CHLORIDE 0.9 % (FLUSH) 0.9 %
5-10 SYRINGE (ML) INJECTION EVERY 8 HOURS
Status: DISCONTINUED | OUTPATIENT
Start: 2017-11-05 | End: 2017-11-05 | Stop reason: HOSPADM

## 2017-11-05 RX ORDER — ALBUTEROL SULFATE 0.83 MG/ML
2.5 SOLUTION RESPIRATORY (INHALATION) AS NEEDED
Status: DISCONTINUED | OUTPATIENT
Start: 2017-11-05 | End: 2017-11-05 | Stop reason: HOSPADM

## 2017-11-05 RX ORDER — ACETAMINOPHEN 325 MG/1
650 TABLET ORAL EVERY 8 HOURS
Status: DISCONTINUED | OUTPATIENT
Start: 2017-11-05 | End: 2017-11-05 | Stop reason: HOSPADM

## 2017-11-05 RX ORDER — ONDANSETRON 2 MG/ML
4 INJECTION INTRAMUSCULAR; INTRAVENOUS
Status: DISCONTINUED | OUTPATIENT
Start: 2017-11-05 | End: 2017-11-08 | Stop reason: HOSPADM

## 2017-11-05 RX ORDER — PROPOFOL 10 MG/ML
INJECTION, EMULSION INTRAVENOUS AS NEEDED
Status: DISCONTINUED | OUTPATIENT
Start: 2017-11-05 | End: 2017-11-05 | Stop reason: HOSPADM

## 2017-11-05 RX ORDER — ATORVASTATIN CALCIUM 40 MG/1
40 TABLET, FILM COATED ORAL DAILY
Status: DISCONTINUED | OUTPATIENT
Start: 2017-11-05 | End: 2017-11-08 | Stop reason: HOSPADM

## 2017-11-05 RX ORDER — MIDAZOLAM HYDROCHLORIDE 1 MG/ML
2 INJECTION, SOLUTION INTRAMUSCULAR; INTRAVENOUS ONCE
Status: ACTIVE | OUTPATIENT
Start: 2017-11-05 | End: 2017-11-06

## 2017-11-05 RX ORDER — ACETAMINOPHEN 325 MG/1
650 TABLET ORAL EVERY 8 HOURS
Status: DISCONTINUED | OUTPATIENT
Start: 2017-11-05 | End: 2017-11-08 | Stop reason: HOSPADM

## 2017-11-05 RX ADMIN — HYDROCHLOROTHIAZIDE: 25 TABLET ORAL at 20:28

## 2017-11-05 RX ADMIN — FENTANYL CITRATE 50 MCG: 50 INJECTION, SOLUTION INTRAMUSCULAR; INTRAVENOUS at 10:58

## 2017-11-05 RX ADMIN — OXYCODONE HYDROCHLORIDE 5 MG: 5 TABLET ORAL at 15:36

## 2017-11-05 RX ADMIN — HYDROMORPHONE HYDROCHLORIDE 1 MG: 1 INJECTION, SOLUTION INTRAMUSCULAR; INTRAVENOUS; SUBCUTANEOUS at 02:24

## 2017-11-05 RX ADMIN — ACETAMINOPHEN 650 MG: 325 TABLET ORAL at 15:37

## 2017-11-05 RX ADMIN — SODIUM CHLORIDE, SODIUM LACTATE, POTASSIUM CHLORIDE, AND CALCIUM CHLORIDE 75 ML/HR: 600; 310; 30; 20 INJECTION, SOLUTION INTRAVENOUS at 09:03

## 2017-11-05 RX ADMIN — SODIUM CHLORIDE, SODIUM LACTATE, POTASSIUM CHLORIDE, CALCIUM CHLORIDE: 600; 310; 30; 20 INJECTION, SOLUTION INTRAVENOUS at 11:45

## 2017-11-05 RX ADMIN — HYDROMORPHONE HYDROCHLORIDE 1 MG: 1 INJECTION, SOLUTION INTRAMUSCULAR; INTRAVENOUS; SUBCUTANEOUS at 01:50

## 2017-11-05 RX ADMIN — OXYCODONE HYDROCHLORIDE 5 MG: 5 TABLET ORAL at 05:36

## 2017-11-05 RX ADMIN — HYDROMORPHONE HYDROCHLORIDE 0.5 MG: 2 INJECTION, SOLUTION INTRAMUSCULAR; INTRAVENOUS; SUBCUTANEOUS at 12:17

## 2017-11-05 RX ADMIN — ONDANSETRON 4 MG: 2 INJECTION INTRAMUSCULAR; INTRAVENOUS at 02:25

## 2017-11-05 RX ADMIN — DILTIAZEM HYDROCHLORIDE 180 MG: 180 CAPSULE, COATED, EXTENDED RELEASE ORAL at 17:42

## 2017-11-05 RX ADMIN — LIDOCAINE HYDROCHLORIDE 80 MG: 20 INJECTION, SOLUTION EPIDURAL; INFILTRATION; INTRACAUDAL; PERINEURAL at 10:41

## 2017-11-05 RX ADMIN — OYSTER SHELL CALCIUM WITH VITAMIN D 1 TABLET: 500; 200 TABLET, FILM COATED ORAL at 17:42

## 2017-11-05 RX ADMIN — DULOXETINE HYDROCHLORIDE 30 MG: 30 CAPSULE, DELAYED RELEASE ORAL at 17:42

## 2017-11-05 RX ADMIN — SODIUM CHLORIDE 2000 ML: 900 INJECTION, SOLUTION INTRAVENOUS at 05:39

## 2017-11-05 RX ADMIN — SODIUM CHLORIDE, SODIUM LACTATE, POTASSIUM CHLORIDE, AND CALCIUM CHLORIDE 75 ML/HR: 600; 310; 30; 20 INJECTION, SOLUTION INTRAVENOUS at 15:38

## 2017-11-05 RX ADMIN — FLECAINIDE ACETATE 50 MG: 100 TABLET ORAL at 07:17

## 2017-11-05 RX ADMIN — FENTANYL CITRATE 25 MCG: 50 INJECTION, SOLUTION INTRAMUSCULAR; INTRAVENOUS at 11:13

## 2017-11-05 RX ADMIN — MESALAMINE 800 MG: 800 TABLET, DELAYED RELEASE ORAL at 17:42

## 2017-11-05 RX ADMIN — PROPOFOL 150 MG: 10 INJECTION, EMULSION INTRAVENOUS at 10:41

## 2017-11-05 RX ADMIN — TUBERCULIN PURIFIED PROTEIN DERIVATIVE 5 UNITS: 5 INJECTION, SOLUTION INTRADERMAL at 05:37

## 2017-11-05 RX ADMIN — ONDANSETRON 8 MG: 8 TABLET, ORALLY DISINTEGRATING ORAL at 01:50

## 2017-11-05 RX ADMIN — SODIUM CHLORIDE, SODIUM LACTATE, POTASSIUM CHLORIDE, CALCIUM CHLORIDE: 600; 310; 30; 20 INJECTION, SOLUTION INTRAVENOUS at 10:32

## 2017-11-05 RX ADMIN — CEFAZOLIN SODIUM 1 G: 1 INJECTION, POWDER, FOR SOLUTION INTRAMUSCULAR; INTRAVENOUS at 17:41

## 2017-11-05 RX ADMIN — HYDROCODONE BITARTRATE AND ACETAMINOPHEN 2 TABLET: 7.5; 325 TABLET ORAL at 20:18

## 2017-11-05 RX ADMIN — HYDROMORPHONE HYDROCHLORIDE 0.5 MG: 2 INJECTION, SOLUTION INTRAMUSCULAR; INTRAVENOUS; SUBCUTANEOUS at 12:27

## 2017-11-05 RX ADMIN — ACETAMINOPHEN 650 MG: 325 TABLET ORAL at 20:16

## 2017-11-05 RX ADMIN — FLECAINIDE ACETATE 50 MG: 100 TABLET ORAL at 20:24

## 2017-11-05 RX ADMIN — Medication 10 ML: at 05:39

## 2017-11-05 RX ADMIN — ACETAMINOPHEN 650 MG: 325 TABLET ORAL at 05:36

## 2017-11-05 RX ADMIN — FENTANYL CITRATE 25 MCG: 50 INJECTION, SOLUTION INTRAMUSCULAR; INTRAVENOUS at 11:15

## 2017-11-05 RX ADMIN — Medication 2 G: at 10:32

## 2017-11-05 NOTE — CONSULTS
PM&R Rehab Consult    Subjective:     Date of Consultation:  November 5, 2017    Referring Physician: Dr. Brenda Holland    Patient is a 80 y.o. male who is being seen for rehab recommendations s/p subcapital femoral fx s/p fall    Principal Problem:    Closed left hip fracture ([facility] (11/5/2017)    Active Problems:    BPH (benign prostatic hyperplasia) (7/23/2015)      Paroxysmal atrial fibrillation ([facility] (4/13/2017)      High blood pressure (10/26/2017)      Chronic kidney disease (11/5/2017)      Closed right hip fracture ([facility] (11/5/2017)    HPI;this is a previously functionally independent 79 y/o gentleman with prior hx CAD, PAF (last in 2016, on asa), HTN, renal cell carcinoma, s/p left nephrectomy March 2017, prostate cancer, s/p RALP 1/2017 presents to the ED tonight with persistent left hip pain and inability to walk or weight bear. Friday morning, 11/3, he was trying to get over a dog gate when he slipped and fell onto his left hip and shoulder . He first went to [address] [place] [address] were negative for fracture, thus he was dc home. When her attempted to weight-bear or ambulate with a walker he could not so he presented to [facility] ED. xrays there revealed a subcapital left femoral neck fracture. He will be transferred to [facility] for definitive surgical repair. He is scheduled for surgery this a.m by Dr. Lauryn Nash. Currently, he is NWB and on bedrest. Pt has CKD with a creatinine of 3.10 on admit.     Patient had just been regaining his endurance from a tough Spring and Summer s/p prostate surgery and nephrectomty with recent bout of shingles    Past Medical History:   Diagnosis Date    Anxiety     Arthritis     Atrial fibrillation (HCC)     paroxysmal Afib, last bout 3/2016    BPH (benign prostatic hypertrophy)     CAD (coronary artery disease)     Cancer ([facility]     prostate dx 01/2017 had a prostectomy    Chronic kidney disease     renal insufficiency , also has renal mass    Chronic pain joint    Elevated PSA     Erectile dysfunction     GERD (gastroesophageal reflux disease)     controlled with meds     High blood pressure 10/26/2017    High cholesterol 10/26/2017    Hypercholesteremia     Hypertension     managed with medication    Macular degeneration     Personal history of prostate cancer     Psychiatric disorder     anxiety    Spondylitis ([facility]     last infusion 9-2015    Ulcerative colitis ([facility]     Ulcerative colitis ([facility]     Vitamin D deficiency     Wears dentures     uppers       Family History   Problem Relation Age of Onset    Heart Disease Father     Hypertension Father       Social History   Substance Use Topics    Smoking status: Former Smoker     Packs/day: 1.00     Years: 40.00     Quit date: 10/29/2005    Smokeless tobacco: Never Used    Alcohol use 1.8 oz/week     1 Glasses of wine, 2 Cans of beer per week   ambulates without AD, one small step in home. Wife works but can do so from home. Doesn't drive due to macular degeneration  Past Surgical History:   Procedure Laterality Date    HX APPENDECTOMY  age 30's   Adan Lout CATARACT REMOVAL Bilateral     IOL implants    HX COLONOSCOPY      HX HERNIA REPAIR Bilateral 2013    HX HERNIA REPAIR      umbilical    HX LAP CHOLECYSTECTOMY  2013    HX NEPHRECTOMY Left 2017    HX ORTHOPAEDIC Right     achilles tendon repair    HX RETINAL DETACHMENT REPAIR        Prior to Admission medications    Medication Sig Start Date End Date Taking? Authorizing Provider   aspirin delayed-release 81 mg tablet Take  by mouth daily. Yes Historical Provider   HYDROcodone-acetaminophen (NORCO) 7.5-325 mg per tablet Take 2 Tabs by mouth every four (4) hours as needed. Max Daily Amount: 12 Tabs. 1/14/17  Yes MD christopher Fernandezalampeter ALCAZAR (ASACOL HD) 800 mg DR tablet Take 800 mg by mouth two (2) times a day. 2/12/15  Yes Historical Provider   dilTIAZem XR (DILACOR XR) 180 mg XR capsule Take 180 mg by mouth daily (with dinner).    Yes Historical Provider   flecainide (TAMBOCOR) 50 mg tablet Take 50 mg by mouth every twelve (12) hours. Yes Historical Provider   losartan-hydrochlorothiazide (HYZAAR) 100-25 mg per tablet TAKE 1/2 TO 1 TABLET DAILY AS DIRECTED FOR HIGH BLOOD PRESSURE , at bedtime 16  Yes Historical Provider   DULoxetine (CYMBALTA) 30 mg capsule Take 30 mg by mouth daily (with dinner). Yes Historical Provider   ergocalciferol (VITAMIN D2) 50,000 unit capsule Take 50,000 Units by mouth every Monday. Yes Historical Provider   VIT A,C & E/B3/B2/LUT/MIN/GLUT (EYE-EMILY EXTRA + LUTEIN PO) Take  by mouth. Yes Historical Provider   atorvastatin (LIPITOR) 40 mg tablet Take 40 mg by mouth every morning. Take day of surgery per anesthesia protocol. Indications: HYPERCHOLESTEROLEMIA   Yes Historical Provider   predniSONE (DELTASONE) 5 mg tablet Take 5 mg by mouth daily. Indications: Ulcerative Colitis   Yes Historical Provider   tadalafil (CIALIS) 20 mg tablet Take 1 Tab by mouth as needed. 6/11/15  Yes HENNA Aburto MD   IRBESARTAN/HYDROCHLOROTHIAZIDE (AVALIDE PO) Take  by mouth. Historical Provider     Allergies   Allergen Reactions    Ambien [Zolpidem] Other (comments)     Confusion      Codeine Other (comments)     \"jittery\"         Review of Systems:  A comprehensive review of systems was negative except for: Constitutional: positive for weight loss  Cardiovascular: positive for irregular heart beats  Gastrointestinal: positive for reflux symptoms  Genitourinary: positive for sexual problems  Musculoskeletal: positive for bone pain  Hx of partial blindness due to Macular degeneration  Objective:     Vitals:  Blood pressure 167/84, pulse 77, temperature 98.2 °F (36.8 °C), resp. rate 17, SpO2 95 %. Temp (24hrs), Av.1 °F (36.7 °C), Min:98 °F (36.7 °C), Max:98.2 °F (36.8 °C)    Physical Exam:  General:  Alert, oriented and mood affect appropriate   Lungs:   Clear to auscultation bilaterally.    Heart:  Regular rate and rhythm, S1, S2 stable, no murmur, click, rub or gallop. Abdomen:   Soft, non-tender. Bowel sounds present. No masses,  No organomegaly. obese   Genitourinary: danielle   Neuro Muscular: LLE externally rotated, shortened, in foam splint. NVI. Wiggles toes, pulses 2+ , cannot tolerate ROM of LLE due to pain  UEs 5/5, RLE 4+ prox, 5/5 distally  Arthritic changes bilateral feet   Skin:  No rashes, lesions, or signs/symptoms or infection. Labs/Studies:Recent Results (from the past 72 hour(s))   CBC W/O DIFF    Collection Time: 11/05/17  2:10 AM   Result Value Ref Range    WBC 11.9 (H) 4.3 - 11.1 K/uL    RBC 3.36 (L) 4.23 - 5.67 M/uL    HGB 10.9 (L) 13.6 - 17.2 g/dL    HCT 32.1 (L) 41.1 - 50.3 %    MCV 95.5 79.6 - 97.8 FL    MCH 32.4 26.1 - 32.9 PG    MCHC 34.0 31.4 - 35.0 g/dL    RDW 14.2 11.9 - 14.6 %    PLATELET 781 281 - 430 K/uL    MPV 9.6 (L) 10.8 - 03.4 FL   METABOLIC PANEL, BASIC    Collection Time: 11/05/17  2:10 AM   Result Value Ref Range    Sodium 137 136 - 145 mmol/L    Potassium 4.8 3.5 - 5.1 mmol/L    Chloride 104 98 - 107 mmol/L    CO2 23 21 - 32 mmol/L    Anion gap 10 7 - 16 mmol/L    Glucose 104 (H) 65 - 100 mg/dL    BUN 51 (H) 8 - 23 MG/DL    Creatinine 3.10 (H) 0.8 - 1.5 MG/DL    GFR est AA 26 (L) >60 ml/min/1.73m2    GFR est non-AA 21 (L) >60 ml/min/1.73m2    Calcium 8.9 8.3 - 10.4 MG/DL   PROTHROMBIN TIME + INR    Collection Time: 11/05/17  2:10 AM   Result Value Ref Range    Prothrombin time 11.0 9.6 - 12.0 sec    INR 1.0 0.9 - 1.2     MSSA/MRSA SC BY PCR, NASAL SWAB    Collection Time: 11/05/17  4:40 AM   Result Value Ref Range    Special Requests: NO SPECIAL REQUESTS      Culture result:        SA target not detected. A MRSA NEGATIVE, SA NEGATIVE test result does not preclude MRSA or SA nasal colonization.          Ambulation:  Activity and Safety  Activity Level: Bed Rest  Ambulate: No (Comment)  Activity: Family/Visitors present, In bed  Weight Bearing Status: NWB (Non Weight Bearing)  NWB (Non Weight Bearing extremities: LLE (Left Lower Extremity)  Assistive Device: Fall prevention device  Safety Measures: Bed/Chair alarm on, Bed/Chair-Wheels locked, Bed in low position, Call light within reach     Impression/Plan:     Principal Problem:    Closed left hip fracture ([facility] (11/5/2017)    Active Problems:    BPH (benign prostatic hyperplasia) (7/23/2015)      Paroxysmal atrial fibrillation (HCC) (4/13/2017)      High blood pressure (10/26/2017)      Chronic kidney disease (11/5/2017)      Closed right hip fracture ([facility] (11/5/2017)     s/p fall sustaining a Left femoral subcapital fx    Recommendations: Continue Acute Rehab Program  Coordination of rehab/medical care  Counseling of PM & R care issues management  Monitoring and management of medical conditions per plan of care/orders   -pt currently preop. Unlikely that pt will require IRC level of care. Currently there is no medical necessity to require an acute level of hospitalization with a doctor and nurse available 24/7. May be approp for skilled rehab. Will await PT evals post op.   -pt with multiple medical comorbidities. If he experiences unforeseen complications post op, could consider for IRC   -dvt prev, pain mgt per primary team  Discussed with wife and pt. They would like to avoid need for a SNF. Prefer  if able.   Reviewed Therapies/Labs/Meds/Records    Signed By:  Beckie Holland MD     November 5, 2017

## 2017-11-05 NOTE — PROGRESS NOTES
Hospitalist Progress Note    2017  Admit Date: 2017  4:57 AM   NAME: Salas Vences   :  1945   MRN:  [id number]   Attending: Eusebio Milton MD  PCP:  Sherri Stewart MD    SUBJECTIVE:   Adiel Rhoades is a 79 yo M directly admitted  from [facility] for L hip fracture after falling on Friday. He is seen in pre-op with Luanne Miles, his wife, at bedside. He complains of some L hip pain but otherwise denies concerns. Review of Systems negative with exception of pertinent positives noted above  PHYSICAL EXAM     Visit Vitals    /81 (BP 1 Location: Right arm, BP Patient Position: Supine)    Pulse 70    Temp 98.2 °F (36.8 °C)    Resp 18    SpO2 92%      Temp (24hrs), Av.2 °F (36.8 °C), Min:98 °F (36.7 °C), Max:98.4 °F (36.9 °C)    Oxygen Therapy  O2 Sat (%): 92 % (17 0854)  Pulse via Oximetry: 69 beats per minute (17 0854)  O2 Device: Room air (17 0854)  No intake or output data in the 24 hours ending 17 1009   General: No acute distress    Lungs:  CTA Bilaterally. Heart:  Regular rate and rhythm,  No murmur, rub, or gallop  Abdomen: Soft, Non distended, Non tender, Positive bowel sounds  Extremities: No cyanosis, clubbing or edema, L leg in protective boot  Neurologic:  No focal deficits    ASSESSMENT      Active Hospital Problems    Diagnosis Date Noted    Closed left hip fracture ([address] 2017    Chronic kidney disease 2017    Closed right hip fracture (HCC) 2017    High blood pressure 10/26/2017    Paroxysmal atrial fibrillation (HCC) 2017    BPH (benign prostatic hyperplasia) 2015     Plan:  · Pending L hip surgery. · Continue current treatments. · Add back anti-hypertensive therapy as his blood pressure tolerates post-op. · Add back aspirin post-op. · As patient admitted after midnight, no charge for this note. DVT Prophylaxis: Lovenox post-op    Signed By: Sasha Smith.  Geraldine Haynes MD     2017

## 2017-11-05 NOTE — PROGRESS NOTES
TRANSFER - IN REPORT:    Verbal report received from Fazal(name) on Anish Lacks  being received from [facility]) for routine progression of care      Report consisted of patients Situation, Background, Assessment and   Recommendations(SBAR). Information from the following report(s) SBAR, Kardex, OR Summary, Intake/Output, MAR and Recent Results was reviewed with the receiving nurse. Opportunity for questions and clarification was provided. Assessment completed upon patients arrival to unit and care assumed.

## 2017-11-05 NOTE — PROGRESS NOTES
Problem: Falls - Risk of  Goal: *Absence of Falls  Document Cedric Fall Risk and appropriate interventions in the flowsheet.    Outcome: Progressing Towards Goal  Fall Risk Interventions:            Medication Interventions: Bed/chair exit alarm, Patient to call before getting OOB, Assess postural VS orthostatic hypotension    Elimination Interventions: Bed/chair exit alarm, Call light in reach, Patient to call for help with toileting needs    History of Falls Interventions: Bed/chair exit alarm, Door open when patient unattended, Investigate reason for fall

## 2017-11-05 NOTE — PROGRESS NOTES
Dual Skin Assessment    Skin assessment completed with Parminder Bro RN. See below. Scattered bruises. Abdomen distended. Patient reports this is normal.     No other abnormalities noted.           Rob Alves RN    11/5/2017 6:27 AM

## 2017-11-05 NOTE — ED TRIAGE NOTES
Pt states he fell Friday while trying to g over a doggie gate, seen at [facility] states xray were negative but pain meds not working. Using a walker at home.

## 2017-11-05 NOTE — PROGRESS NOTES
Problem: Mobility Impaired (Adult and Pediatric)  Goal: *Acute Goals and Plan of Care (Insert Text)  1. Mr. Chhaya Acuña will perform supine to sit and sit to supine independently in 5 days. 2.  Mr. Chhaya Acuña will perform sit to stand and bed to chair independently with least restrictive device in 5 days. 3.  Mr. Chhaya Acuña will perform gait with least restrictive device 200 ft independently in 5 days. 4.  Mr. Chhaya Acuña will maintain hip precautions in 5 days. 5.  Mr. Chhaya Acuña will go up and down 1 step independently with rolling walker in 5 days. 6.  Mr. Chhaya Acuña will perform therex to left lower extremity x 10 reps active range of motion in 5 days. PHYSICAL THERAPY: Initial Assessment 11/5/2017  INPATIENT: Hospital Day: 1  [place] / Product Type: Medicare /      NAME/AGE/GENDER: Audrey Yepez is a 80 y.o. male   PRIMARY DIAGNOSIS: left hip fracture  Closed right hip fracture (HCC)  Left femoral neck fracture Closed left hip fracture (HCC) Closed left hip fracture (HCC)  Procedure(s) (LRB):  HIP HEMIARTHROPLASTY LEFT (Left)  Day of Surgery  ICD-10: Treatment Diagnosis:   · Generalized Muscle Weakness (M62.81)  · Difficulty in walking, Not elsewhere classified (R26.2)  · History of falling (Z91.81)   Precaution/Allergies:  Ambien [zolpidem] and Codeine      ASSESSMENT:     Mr. Chhaya Acuña presents doing remarkably well post op from hemiarthroplasty for hip fracture. He required min assist for bed mobility and contact guard for transfers and gait. Mr. Chhaya Acuña tells me he wants to go home and not to rehab. His wife is present and states she can work from home and her sister is coming into town to help. All agree to see how the next day or so goes. Mr. Chhaya Acuña is appropriate for skilled PT to maximize his rehab potential which is good at this point. This section established at most recent assessment   PROBLEM LIST (Impairments causing functional limitations):  1. Decreased Strength  2.  Decreased Transfer Abilities  3. Decreased Ambulation Ability/Technique  4. Decreased Activity Tolerance   INTERVENTIONS PLANNED: (Benefits and precautions of physical therapy have been discussed with the patient.)  1. Bed Mobility  2. Gait Training  3. Therapeutic Activites  4. Transfer Training  5. Group Therapy     TREATMENT PLAN: Frequency/Duration: twice daily for duration of hospital stay  Rehabilitation Potential For Stated Goals: Good     RECOMMENDED REHABILITATION/EQUIPMENT: (at time of discharge pending progress): Due to the probability of continued deficits (see above) this patient will likely need continued skilled physical therapy after discharge. Equipment:   [place], Type: Rolling Walker              HISTORY:   History of Present Injury/Illness (Reason for Referral):  79 y/o gentleman with prior hx CAD, PAF (last in 2016, on asa), HTN, renal cell carcinoma, s/p left nephrectomy March 2017, prostate cancer, s/p RALP 1/2017 presents to the ED tonight with persistent left hip pain and inability to walk or weight bear. Friday morning he was trying to get over a dog gate when he slipped and fell onto his left hip and shoulder. Went to [facility] and xrays were negative for fracture. He did not sustain any head injury or LOC. Tried to weight-bear or ambulate tonight with a walker but could not so came to [facility] ED. xrays tonight reveal subcapital left femoral neck fracture. He will be transferred to [facility] for definitive surgical repair. He denies any recent cardiac complaints, no chest pain, palpitations, shortness of breath, fever or chills. No bleeding tendencies. He is hemodynamically stable for transfer tonight via EMS. Past Medical History/Comorbidities:   Mr. Cordell Johnson  has a past medical history of Anxiety; Arthritis; Atrial fibrillation ([address] BPH (benign prostatic hypertrophy); CAD (coronary artery disease); Cancer ([address] Chronic kidney disease; Chronic pain; Elevated PSA;  Erectile dysfunction; GERD (gastroesophageal reflux disease); High blood pressure (10/26/2017); High cholesterol (10/26/2017); Hypercholesteremia; Hypertension; Macular degeneration; Personal history of prostate cancer; Psychiatric disorder; Spondylitis ([address] Ulcerative colitis ([address] Ulcerative colitis ([address] Vitamin D deficiency; and Wears dentures. Mr. Leola Babinski  has a past surgical history that includes colonoscopy; appendectomy (age 30's); orthopaedic (Right); hernia repair (Bilateral, 2013); lap cholecystectomy (2013); hernia repair; cataract removal (Bilateral); retinal detachment repair; and nephrectomy (Left, 2017). Social History/Living Environment:   Home Environment: Private residence  # Steps to Enter: 1  One/Two Story Residence: One story  Living Alone: No  Support Systems: Spouse/Significant Other/Partner  Patient Expects to be Discharged to[de-identified] Private residence  Current DME Used/Available at Home: None  Prior Level of Function/Work/Activity:  independent  age   Number of Personal Factors/Comorbidities that affect the Plan of Care: 1-2: MODERATE COMPLEXITY   EXAMINATION:   Most Recent Physical Functioning:   Gross Assessment:                  Posture:     Balance:  Sitting: Impaired  Sitting - Static: Good (unsupported)  Sitting - Dynamic: Fair (occasional)  Standing: Impaired  Standing - Static: Fair  Standing - Dynamic : Fair Bed Mobility:  Supine to Sit: Minimum assistance  Wheelchair Mobility:     Transfers:  Sit to Stand: Contact guard assistance  Stand to Sit: Contact guard assistance  Bed to Chair: Contact guard assistance  Gait:  Left Side Weight Bearing: As tolerated  Step Length: Left shortened;Right shortened  Distance (ft): 5 Feet (ft)  Assistive Device: Walker, rolling      Body Structures Involved:  1. Muscles Body Functions Affected:  1. Movement Related Activities and Participation Affected:  1.  Mobility   Number of elements that affect the Plan of Care: 3: MODERATE COMPLEXITY   CLINICAL PRESENTATION: Presentation: Evolving clinical presentation with changing clinical characteristics: MODERATE COMPLEXITY   CLINICAL DECISION MAKIN [facility] Mobility Inpatient Short Form  How much difficulty does the patient currently have. .. Unable A Lot A Little None   1. Turning over in bed (including adjusting bedclothes, sheets and blankets)? [] 1   [] 2   [x] 3   [] 4   2. Sitting down on and standing up from a chair with arms ( e.g., wheelchair, bedside commode, etc.)   [] 1   [] 2   [x] 3   [] 4   3. Moving from lying on back to sitting on the side of the bed? [] 1   [x] 2   [] 3   [] 4   How much help from another person does the patient currently need. .. Total A Lot A Little None   4. Moving to and from a bed to a chair (including a wheelchair)? [] 1   [] 2   [x] 3   [] 4   5. Need to walk in hospital room? [] 1   [] 2   [x] 3   [] 4   6. Climbing 3-5 steps with a railing? [] 1   [x] 2   [] 3   [] 4   © , Trustees of [address] [place] [address], under license to [employer]. All rights reserved      Score:  Initial: 16 Most Recent: X (Date: -- )    Interpretation of Tool:  Represents activities that are increasingly more difficult (i.e. Bed mobility, Transfers, Gait). Score 24 23 22-20 19-15 14-10 9-7 6     Modifier CH CI CJ CK CL CM CN      ? Mobility - Walking and Moving Around:     - CURRENT STATUS: CK - 40%-59% impaired, limited or restricted    - GOAL STATUS: CK - 40%-59% impaired, limited or restricted    - D/C STATUS:  ---------------To be determined---------------  [place] / [place] / Product Type: Medicare /      Medical Necessity:     · Patient is expected to demonstrate progress in functional technique to increase independence with mobility and gait. Reason for Services/Other Comments:  · Patient continues to require present interventions due to patient's inability to function at baseline. .   Use of outcome tool(s) and clinical judgement create a POC that gives a: Questionable prediction of patient's progress: MODERATE COMPLEXITY            TREATMENT:   (In addition to Assessment/Re-Assessment sessions the following treatments were rendered)   Pre-treatment Symptoms/Complaints:  none  Pain: Initial:   Pain Intensity 1: 4  Pain Location 1: Hip  Pain Intervention(s) 1: Repositioned, Emotional support  Post Session:  none     Assessment/Reassessment only, no treatment provided today    Braces/Orthotics/Lines/Etc:   · IV  · O2 Device: Nasal cannula  Treatment/Session Assessment:    · Response to Treatment:  good  · Interdisciplinary Collaboration:   o Registered Nurse  · After treatment position/precautions:   o Up in chair  o Call light within reach  o RN notified   · Compliance with Program/Exercises: Will assess as treatment progresses. · Recommendations/Intent for next treatment session: \"Next visit will focus on advancements to more challenging activities and reduction in assistance provided\".   Total Treatment Duration:  PT Patient Time In/Time Out  Time In: 1600  Time Out: 1630    Eva Lamas, PT

## 2017-11-05 NOTE — PROGRESS NOTES
TRANSFER - IN REPORT:    Verbal report received from Eulalia Koch RN(name) on Sury Alfonso  being received from HOSPITAL [place] [id number] OF [facility] ER(unit) for routine progression of care      Report consisted of patients Situation, Background, Assessment and   Recommendations(SBAR). Information from the following report(s) SBAR, Kardex, ED Summary, Intake/Output, MAR and Recent Results was reviewed with the receiving nurse. Opportunity for questions and clarification was provided. Assessment completed upon patients arrival to unit and care assumed.

## 2017-11-05 NOTE — OP NOTES
Operative Report     Patient: Anderson Phalen MRN: [id number]  SSN: [id number]    YOB: 1945  Age: 80 y.o. Sex: male      Indications: Anderson Phalen was admitted to the hospital with a brief history of left femoral neck fracture. The patient now presents for left Hip Hemiarthroplasty. Date of Surgery: 11/5/2017     Preoperative diagnosis:  Left femoral neck fracture    Postoperative diagnosis: Left femoral neck fracture    Surgeon(s) and Role:     * Kade Murillo MD - Primary    Anesthesia: General    Procedure:  Procedure(s):  HIP HEMIARTHROPLASTY LEFT       Procedure in Detail:   The patient is brought to the operative suite and placed in the supine position. After adequate anesthesia is achieved in the form of spinal, the patient is placed in the lateral decubitus position with the left side up. Down leg is well padded and axillary roll is placed. The lefthip is then prepped and draped in the usual sterile fashion. Standard posterior approach to the hip is performed. Hemostasis is achieved with Bovie cautery. The fascia is sharply incised along the line of the skin incision and the gluteus fibers are split along their length. The sciatic nerve is identified and protected. Steinmann pin is inserted above the pyriformis tendon and tapped into the bone in order to aid as a retractor. The [facility] retractor is placed beneath the lesser trochanter and then Bovie cautery is used to take down the capsule and short external rotators and reflect them posteriorly. A second Steinmann pin is inserted in the rim of the acetabulum posteriorly, also to aid in retraction. The underlying subcapital fracture is identified. The head is removed with a corkscrew. The acetabulum is examined and noted to be in good condition. Trial bipolar heads are utilized. After an appropriate suction fit is attained, we turned our attention to the femur.   Biplanar cut is made 10 millimeters proximal to the lesser trochanter. The canal finder is used to identify the canal.  Single reaming and sequential broachings are performed until the broach has an excellent, secure fit. Calcar reamers are used to level the calcar. The hip is then trialed with bipolar head and neck. After the hip is reduced it is taken through range of motion. When the hip is deemed flexion to 90 degrees, internal rotation to 30 degrees, then adduction to 30 degrees is obtained without the hip dislocating. Bone hook is then used to dislocate the hip and the trials are removed. The hip is irrigated with three liters of pulse lavage. The [employer] [place] Hip System has been utilized. The appropriate size femoral prosthesis is then inserted with excellent purchase. The appropriate size neck and bipolar head are then tapped onto the [facility] FOR CONTINUING [facility] CARE [facility]. The hip is then reduced, taken through range of motion with the previously mentioned results. Capsule and short external rotators are repaired to the greater trochanter through drill holes. The remainder of the wound is closed in layers without a drain. Sterile compressive dressings are applied. The patient is then converted to a supine position. Knee immobilizer is applied. Then removed from the table and transferred to the postanesthesia care unit, alert and oriented, under the care of Anesthesia. Estimated Blood Loss: 400 cc    Specimens: * No specimens in log *     Implant:   Implant Name Type Inv.  Item Serial No.  Lot No. LRB No. Used Action   HEAD FEM [employer] [id number] WALE --  - [id number]  HEAD FEM [facility] [id number] WALE --   [facility] [id number] Left 1 Implanted   STEM FEM CORAIL STD COL SZ 14 --  - [id number]  STEM FEM CORAIL STD COL SZ 14 --   [facility] [id number] Left 1 Implanted   HEAD FEM 28MM +5MM NK --  - [id number]   HEAD FEM 28MM +5MM NK --    [facility] [id number] Left 1 Implanted       Closure: Primary    Complications: None    Signed By: Francis Freed Suyapa Mercedes MD     November 5, 2017

## 2017-11-05 NOTE — PERIOP NOTES
TRANSFER - IN REPORT:    Verbal report received from TAMIKO Patel on Oleg Dole  being received from 7th floor, [phone] for ordered procedure      Report consisted of patients Situation, Background, Assessment and   Recommendations(SBAR). Information from the following report(s) SBAR, Kardex and MAR was reviewed with the receiving nurse. Opportunity for questions and clarification was provided. Assessment completed upon patients arrival to unit and care assumed.

## 2017-11-05 NOTE — PROGRESS NOTES
ORTHO:    PATIENT TRANSFERRED FROM [facility] ER FOR LEFT HIP FRACTURE. SURGERY SCHEDULED FOR LEFT HIP HEMIARTHROPLASTY AT 1030 TODAY WITH DR. PATEL. PLEASE KEEP NPO.

## 2017-11-05 NOTE — ANESTHESIA PREPROCEDURE EVALUATION
Anesthetic History   No history of anesthetic complications            Review of Systems / Medical History  Patient summary reviewed and pertinent labs reviewed    Pulmonary          Undiagnosed apnea         Neuro/Psych              Cardiovascular    Hypertension: well controlled        Dysrhythmias : atrial fibrillation  Hyperlipidemia    Exercise tolerance: >4 METS     GI/Hepatic/Renal     GERD: well controlled    Renal disease: CRI      Comments: [facility]/Other        Arthritis and cancer (Prostate)     Other Findings            Physical Exam    Airway  Mallampati: II  TM Distance: 4 - 6 cm  Neck ROM: normal range of motion   Mouth opening: Normal     Cardiovascular  Regular rate and rhythm,  S1 and S2 normal,  no murmur, click, rub, or gallop  Rhythm: regular           Dental    Dentition: Full upper dentures     Pulmonary  Breath sounds clear to auscultation               Abdominal  GI exam deferred       Other Findings            Anesthetic Plan    ASA: 3  Anesthesia type: general and spinal          Induction: Intravenous  Anesthetic plan and risks discussed with: Patient and Spouse      Patient requests general

## 2017-11-05 NOTE — ED NOTES
TRANSFER - OUT REPORT:    Verbal report given to tiesha rn(name) on Gianluca Ice  being transferred to 734(unit) for routine progression of care       Report consisted of patients Situation, Background, Assessment and   Recommendations(SBAR). Information from the following report(s) ED Summary, Procedure Summary and Intake/Output was reviewed with the receiving nurse. Lines:   Peripheral IV 11/05/17 Right Wrist (Active)   Site Assessment Clean, dry, & intact 11/5/2017  2:19 AM   Phlebitis Assessment 0 11/5/2017  2:19 AM   Infiltration Assessment 0 11/5/2017  2:19 AM   Dressing Status Clean, dry, & intact 11/5/2017  2:19 AM   Dressing Type Transparent 11/5/2017  2:19 AM   Hub Color/Line Status Green 11/5/2017  2:19 AM        Opportunity for questions and clarification was provided.       Patient transported with:   [facility] ambulance

## 2017-11-05 NOTE — ANESTHESIA POSTPROCEDURE EVALUATION
Post-Anesthesia Evaluation and Assessment    Patient: Edenilson Argueta MRN: [id number]  SSN: [id number]    YOB: 1945  Age: 80 y.o. Sex: male       Cardiovascular Function/Vital Signs  Visit Vitals    /84    Pulse 68    Temp 36.6 °C (97.8 °F)    Resp 15    SpO2 96%       Patient is status post general, spinal anesthesia for Procedure(s):  HIP HEMIARTHROPLASTY LEFT. Nausea/Vomiting: None    Postoperative hydration reviewed and adequate. Pain:  Pain Scale 1: Numeric (0 - 10) (11/05/17 1217)  Pain Intensity 1: 4 (11/05/17 1217)   Managed    Neurological Status: At baseline    Mental Status and Level of Consciousness: Alert and oriented     Pulmonary Status:   O2 Device: Nasal cannula (11/05/17 1207)   Adequate oxygenation and airway patent    Complications related to anesthesia: None    Post-anesthesia assessment completed.  No concerns    Signed By: Guera Hernandez MD     November 5, 2017

## 2017-11-05 NOTE — H&P
Hospitalist H&P/Consult Note     Admit Date:  No admission date for patient encounter. Name:  Rosanna Tijerina   Age:  80 y.o.  :  1945   MRN:  [id number]   PCP:  Lauren Looney MD  Treatment Team: Attending Provider: Lesly Valdez MD    HPI:   79 y/o gentleman with prior hx CAD, PAF (last in , on asa), HTN, renal cell carcinoma, s/p left nephrectomy 2017, prostate cancer, s/p RALP 2017 presents to the ED tonight with persistent left hip pain and inability to walk or weight bear. Friday morning he was trying to get over a dog gate when he slipped and fell onto his left hip and shoulder. Went to [facility] and xrays were negative for fracture. He did not sustain any head injury or LOC. Tried to weight-bear or ambulate tonight with a walker but could not so came to [facility] ED. xrays tonight reveal subcapital left femoral neck fracture. He will be transferred to [facility] for definitive surgical repair. He denies any recent cardiac complaints, no chest pain, palpitations, shortness of breath, fever or chills. No bleeding tendencies. He is hemodynamically stable for transfer tonight via EMS. 10 systems reviewed and negative except as noted in HPI.   Past Medical History:   Diagnosis Date    Anxiety     Arthritis     Atrial fibrillation (HCC)     paroxysmal Afib, last bout 3/2016    BPH (benign prostatic hypertrophy)     CAD (coronary artery disease)     Cancer (HCC)     prostate dx 2017 had a prostectomy    Chronic kidney disease     renal insufficiency , also has renal mass    Chronic pain     joint    Elevated PSA     Erectile dysfunction     GERD (gastroesophageal reflux disease)     controlled with meds     High blood pressure 10/26/2017    High cholesterol 10/26/2017    Hypercholesteremia     Hypertension     managed with medication    Macular degeneration     Personal history of prostate cancer     Psychiatric disorder     anxiety    Spondylitis ([facility] [address] last infusion 9-2015    Ulcerative colitis ([facility] [address]     Ulcerative colitis ([facility] [address]     Vitamin D deficiency     Wears dentures     uppers       Past Surgical History:   Procedure Laterality Date    HX APPENDECTOMY  age 30's   [de-identified] CATARACT REMOVAL Bilateral     IOL implants    HX COLONOSCOPY      HX HERNIA REPAIR Bilateral 2013    HX HERNIA REPAIR      umbilical    HX LAP CHOLECYSTECTOMY  2013    HX NEPHRECTOMY Left 2017    HX ORTHOPAEDIC Right     achilles tendon repair    HX RETINAL DETACHMENT REPAIR        Cannot display prior to admission medications because the patient has not been admitted in this contact. Allergies   Allergen Reactions    Ambien [Zolpidem] Other (comments)     Confusion      Codeine Other (comments)     \"jittery\"       Social History   Substance Use Topics    Smoking status: Former Smoker     Packs/day: 1.00     Years: 40.00     Quit date: 10/29/2005    Smokeless tobacco: Never Used    Alcohol use 1.8 oz/week     1 Glasses of wine, 2 Cans of beer per week      Family History   Problem Relation Age of Onset    Heart Disease Father     Hypertension Father       Immunization History   Administered Date(s) Administered    Influenza High Dose Vaccine PF 10/22/2015    Influenza Vaccine 11/22/2016    Pneumococcal Conjugate (PCV-13) 02/12/2015    Pneumococcal Polysaccharide (PPSV-23) 01/11/2013    TB Skin Test (PPD) Intradermal 03/30/2010    Tdap 10/29/2009       Objective:   No data found. No intake or output data in the 24 hours ending 11/05/17 0248    Physical Exam:  General:    Well nourished. Alert and oriented. Eyes:   Normal sclera. Extraocular movements intact. ENT:  Normocephalic, atraumatic. Moist mucous membranes  CV:   Regular rate and rhythm. No murmur, rub, or gallop. Lungs:  Clear to auscultation bilaterally. No wheezing, rhonchi, or rales. Abdomen: Soft, nontender, nondistended. Bowel sounds normal.   Extremities: Warm and dry.   No cyanosis or edema. patient laying on right side. Has left leg flexed. Tender to palpation. Sensation, motor and vasculature intact  Neurologic: CN II-XII grossly intact. Sensation intact. Skin:     No rashes or jaundice. No wounds. Psych:  Normal mood and affect. I reviewed the labs, imaging, EKGs, telemetry, and other studies done this admission. Data Review:   Recent Results (from the past 24 hour(s))   CBC W/O DIFF    Collection Time: 11/05/17  2:10 AM   Result Value Ref Range    WBC 11.9 (H) 4.3 - 11.1 K/uL    RBC 3.36 (L) 4.23 - 5.67 M/uL    HGB 10.9 (L) 13.6 - 17.2 g/dL    HCT 32.1 (L) 41.1 - 50.3 %    MCV 95.5 79.6 - 97.8 FL    MCH 32.4 26.1 - 32.9 PG    MCHC 34.0 31.4 - 35.0 g/dL    RDW 14.2 11.9 - 14.6 %    PLATELET 026 167 - 481 K/uL    MPV 9.6 (L) 10.8 - 99.4 FL   METABOLIC PANEL, BASIC    Collection Time: 11/05/17  2:10 AM   Result Value Ref Range    Sodium 137 136 - 145 mmol/L    Potassium 4.8 3.5 - 5.1 mmol/L    Chloride 104 98 - 107 mmol/L    CO2 23 21 - 32 mmol/L    Anion gap 10 7 - 16 mmol/L    Glucose 104 (H) 65 - 100 mg/dL    BUN 51 (H) 8 - 23 MG/DL    Creatinine 3.10 (H) 0.8 - 1.5 MG/DL    GFR est AA 26 (L) >60 ml/min/1.73m2    GFR est non-AA 21 (L) >60 ml/min/1.73m2    Calcium 8.9 8.3 - 10.4 MG/DL   PROTHROMBIN TIME + INR    Collection Time: 11/05/17  2:10 AM   Result Value Ref Range    Prothrombin time 11.0 9.6 - 12.0 sec    INR 1.0 0.9 - 1.2         Imaging Studies:  CXR Results  (Last 48 hours)               11/05/17 0113  XR CHEST SNGL V Final result    Impression:  IMPRESSION:       No evidence of acute pulmonary disease. Narrative:   Portable view of the chest        COMPARISON: December 29, 2016. CLINICAL HISTORY: Hip fracture. Fall. FINDINGS:       No focal pulmonary consolidation, pleural effusion or pneumothorax. No pulmonary   edema. Cardiac silhouette appears within normal limits. Increased right   paratracheal density, likely overlapping vascular structures.  Bones are   osteopenic. CT Results  (Last 48 hours)    None          Assessment and Plan:     Hospital Problems as of 11/5/2017  Date Reviewed: 7/21/2017          Codes Class Noted - Resolved POA    * (Principal)Closed left hip fracture ([facility] [address] ICD-10-CM: E14.488X  ICD-9-CM: 820.8  11/5/2017 - Present Yes        High blood pressure ICD-10-CM: I10  ICD-9-CM: 401.9  10/26/2017 - Present Yes        Paroxysmal atrial fibrillation (HCC) ICD-10-CM: I48.0  ICD-9-CM: 427.31  4/13/2017 - Present Yes        Chronic kidney disease ICD-10-CM: N18.9  ICD-9-CM: 585.9  11/5/2017 - Present Yes        BPH (benign prostatic hyperplasia) ICD-10-CM: N40.0  ICD-9-CM: 600.00  7/23/2015 - Present Yes                PLAN:  · Admit to ortho unit  · Hip fracture order set utilized  · Pain control--states he was unable to tolerate morphine before when given for pain but tolerated the dilaudid given in the ED tonight. · Bedrest  · Hold antiplatelets/AC  · Ortho consulted  · Continue other home meds as ordered  · Will check UA. Labs and EKG pending at time of admission  · Has chronic kidney disease. Will hydrate tonight. Check Mag and Phos. Avoid nephrotoxic medications, especially NSAIDS. Hold his ARB/HCTZ. · He is s/p left nephrectomy  · Add prn IV hydralazine    FEN:  NPO prior to surgery  DVT ppx:  SCDs until after surgery  Estimated LOS:  3 days  Anticipated DC needs:  Ppd ordered. CM consulted.   PT/OT evals after surgery  Risk:  high    Signed:  Amita Byrd MD

## 2017-11-05 NOTE — PERIOP NOTES
TRANSFER - OUT REPORT:    Verbal report given to Carmen Cai RN(name) on Sury Alfonso  being transferred to room 734  (unit) for routine post - op       Report consisted of patients Situation, Background, Assessment and   Recommendations(SBAR). Information from the following report(s) SBAR, Kardex, OR Summary, Intake/Output and MAR was reviewed with the receiving nurse. Lines:   Peripheral IV 11/05/17 Right Wrist (Active)   Site Assessment Clean, dry, & intact 11/5/2017 12:07 PM   Phlebitis Assessment 0 11/5/2017 12:07 PM   Infiltration Assessment 0 11/5/2017 12:07 PM   Dressing Status Clean, dry, & intact 11/5/2017 12:07 PM   Dressing Type Transparent;Tape 11/5/2017 12:07 PM   Hub Color/Line Status Juleen Fabben; Infusing 11/5/2017 12:07 PM        Opportunity for questions and clarification was provided. Patient transported with:   O2 @ 2 liters    VTE prophylaxis orders have been written for Sury Alfonso. Patient and family given floor number and nurses name. Family updated re: pt status after security code verified.

## 2017-11-05 NOTE — PROGRESS NOTES
0752: I was told in report that the [address] team got this patient's EKG and all the blood work done, and that the RN, Mary Rm, would attempt to place a danielle. The patient arrived to the floor with no danielle, no EKG, and multiple labs not done. 0430: I placed orders for the labs. I attempted to get the danielle, I met resistance and could not get urine back. 0500: I attempted another danielle with TAMIKO Gongora. Again we met resistance and determined the cath was coiling once inside the patient. 0530: I called to request someone to get the labs. Lab reported they would send someone up.    0610: I put in an order for a coudet catheter from [facility], since 6th floor did not have one.    0630: Marc Meadows, charge RN, called materials and house supervisor for the coudet catheter. 0954: Will pass this information on to the day RN.

## 2017-11-05 NOTE — ED PROVIDER NOTES
HPI Comments: Susan Venegas yesterday and landed on left side. Seen at [facility] with negative films-increased pain tonight and no longer able to ambulate. wAS USING WALKER. Patient is a 80 y.o. male presenting with hip pain. The history is provided by the patient. Hip Pain    This is a new problem. The current episode started yesterday. The problem occurs constantly. The problem has been gradually worsening. Pain location: left hip. The pain is severe. Pertinent negatives include no numbness, no tingling and no back pain. He has tried nothing for the symptoms. There has been a history of trauma.         Past Medical History:   Diagnosis Date    Anxiety     Arthritis     Atrial fibrillation (HCC)     paroxysmal Afib, last bout 3/2016    BPH (benign prostatic hypertrophy)     CAD (coronary artery disease)     Cancer (HCC)     prostate dx 01/2017 had a prostectomy    Chronic kidney disease     renal insufficiency , also has renal mass    Chronic pain     joint    Elevated PSA     Erectile dysfunction     GERD (gastroesophageal reflux disease)     controlled with meds     High blood pressure 10/26/2017    High cholesterol 10/26/2017    Hypercholesteremia     Hypertension     managed with medication    Macular degeneration     Personal history of prostate cancer     Psychiatric disorder     anxiety    Spondylitis ([address]     last infusion 9-2015    Ulcerative colitis ([address]     Ulcerative colitis ([address]     Vitamin D deficiency     Wears dentures     uppers        Past Surgical History:   Procedure Laterality Date    HX APPENDECTOMY  age 30's   [facility] CATARACT REMOVAL Bilateral     IOL implants    HX COLONOSCOPY      HX HERNIA REPAIR Bilateral 2013    HX HERNIA REPAIR      umbilical    HX LAP CHOLECYSTECTOMY  2013    HX NEPHRECTOMY Left 2017    HX ORTHOPAEDIC Right     achilles tendon repair    HX RETINAL DETACHMENT REPAIR           Family History:   Problem Relation Age of Onset    Heart Disease Father     Hypertension Father        Social History     Social History    Marital status:      Spouse name: N/A    Number of children: N/A    Years of education: N/A     Occupational History    Not on file. 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 ALLERGIES: Ambien [zolpidem] and Codeine    Review of Systems   HENT: Negative for facial swelling and rhinorrhea. Eyes: Negative for pain and visual disturbance. Respiratory: Negative for cough and shortness of breath. Cardiovascular: Negative for chest pain and leg swelling. Gastrointestinal: Negative for abdominal pain, nausea and vomiting. Genitourinary: Negative for dysuria and hematuria. Musculoskeletal: Negative for back pain. Neurological: Negative for tingling, weakness, numbness and headaches. Psychiatric/Behavioral: Negative for agitation and confusion. Vitals:    11/05/17 0103   BP: (!) 182/95   Pulse: 86   Resp: 20   Temp: 98 °F (36.7 °C)   SpO2: 97%   Weight: 83.9 kg (185 lb)   Height: 5' 10\" (1.778 m)            Physical Exam   Constitutional: He is oriented to person, place, and time. He appears well-developed and well-nourished. HENT:   Mouth/Throat: Oropharynx is clear and moist. No oropharyngeal exudate. Eyes: Pupils are equal, round, and reactive to light. Neck: Neck supple. Cardiovascular: Normal rate, regular rhythm, normal heart sounds and intact distal pulses. Pulmonary/Chest: Effort normal and breath sounds normal.   Abdominal: Soft. Bowel sounds are normal. He exhibits no distension. There is no tenderness.  There is no rebound and no guarding. Musculoskeletal: He exhibits no edema or tenderness. LEFT LEG HELD FLEXED, NOT SHORTENED, PAIN WITH FLEXION AND INT AND EXTERNAL ROTATION. nEUROVASCULARLY INTACT. Lymphadenopathy:     He has no cervical adenopathy. Neurological: He is alert and oriented to person, place, and time. Skin: Skin is warm and dry. Nursing note and vitals reviewed.        MDM  Number of Diagnoses or Management Options  Diagnosis management comments: REPEAT XRAYS TO EVALUATE FOR DELAYED COMPLETE FX.      1:32 AM  LEFT HIP FEMORAL NECK FX       Amount and/or Complexity of Data Reviewed  Clinical lab tests: ordered and reviewed  Tests in the radiology section of CPT®: ordered and reviewed  Discuss the patient with other providers: yes (D/W HISPITALIST FOR TRANSFER DOWNTOWN TO  N Jose Duran RN.)      ED Course       Procedures

## 2017-11-06 LAB
ATRIAL RATE: 78 BPM
BASOPHILS # BLD: 0 K/UL (ref 0–0.2)
BASOPHILS NFR BLD: 0 % (ref 0–2)
CALCULATED P AXIS, ECG09: 42 DEGREES
CALCULATED R AXIS, ECG10: 47 DEGREES
CALCULATED T AXIS, ECG11: 50 DEGREES
DIAGNOSIS, 93000: NORMAL
DIFFERENTIAL METHOD BLD: ABNORMAL
EOSINOPHIL # BLD: 0.1 K/UL (ref 0–0.8)
EOSINOPHIL NFR BLD: 1 % (ref 0.5–7.8)
ERYTHROCYTE [DISTWIDTH] IN BLOOD BY AUTOMATED COUNT: 14.3 % (ref 11.9–14.6)
HCT VFR BLD AUTO: 27.8 % (ref 41.1–50.3)
HGB BLD-MCNC: 9.1 G/DL (ref 13.6–17.2)
IMM GRANULOCYTES # BLD: 0 K/UL (ref 0–0.5)
IMM GRANULOCYTES NFR BLD: 0 % (ref 0–5)
LYMPHOCYTES # BLD: 1.4 K/UL (ref 0.5–4.6)
LYMPHOCYTES NFR BLD: 15 % (ref 13–44)
MCH RBC QN AUTO: 32 PG (ref 26.1–32.9)
MCHC RBC AUTO-ENTMCNC: 32.7 G/DL (ref 31.4–35)
MCV RBC AUTO: 97.9 FL (ref 79.6–97.8)
MM INDURATION POC: 0 MM (ref 0–5)
MONOCYTES # BLD: 0.7 K/UL (ref 0.1–1.3)
MONOCYTES NFR BLD: 7 % (ref 4–12)
NEUTS SEG # BLD: 7 K/UL (ref 1.7–8.2)
NEUTS SEG NFR BLD: 77 % (ref 43–78)
P-R INTERVAL, ECG05: 204 MS
PLATELET # BLD AUTO: 185 K/UL (ref 150–450)
PMV BLD AUTO: 9.1 FL (ref 10.8–14.1)
PPD POC: NEGATIVE NEGATIVE
Q-T INTERVAL, ECG07: 376 MS
QRS DURATION, ECG06: 84 MS
QTC CALCULATION (BEZET), ECG08: 428 MS
RBC # BLD AUTO: 2.84 M/UL (ref 4.23–5.67)
VENTRICULAR RATE, ECG03: 78 BPM
WBC # BLD AUTO: 9.2 K/UL (ref 4.3–11.1)

## 2017-11-06 PROCEDURE — 74011250637 HC RX REV CODE- 250/637: Performed by: ORTHOPAEDIC SURGERY

## 2017-11-06 PROCEDURE — 74011636637 HC RX REV CODE- 636/637: Performed by: HOSPITALIST

## 2017-11-06 PROCEDURE — 74011000258 HC RX REV CODE- 258: Performed by: ORTHOPAEDIC SURGERY

## 2017-11-06 PROCEDURE — 74011250636 HC RX REV CODE- 250/636: Performed by: ORTHOPAEDIC SURGERY

## 2017-11-06 PROCEDURE — 97165 OT EVAL LOW COMPLEX 30 MIN: CPT

## 2017-11-06 PROCEDURE — 97530 THERAPEUTIC ACTIVITIES: CPT

## 2017-11-06 PROCEDURE — 97535 SELF CARE MNGMENT TRAINING: CPT

## 2017-11-06 PROCEDURE — 36415 COLL VENOUS BLD VENIPUNCTURE: CPT | Performed by: ORTHOPAEDIC SURGERY

## 2017-11-06 PROCEDURE — 65270000029 HC RM PRIVATE

## 2017-11-06 PROCEDURE — 97150 GROUP THERAPEUTIC PROCEDURES: CPT

## 2017-11-06 PROCEDURE — 99232 SBSQ HOSP IP/OBS MODERATE 35: CPT | Performed by: PHYSICAL MEDICINE & REHABILITATION

## 2017-11-06 PROCEDURE — 85025 COMPLETE CBC W/AUTO DIFF WBC: CPT | Performed by: ORTHOPAEDIC SURGERY

## 2017-11-06 PROCEDURE — 74011250637 HC RX REV CODE- 250/637: Performed by: HOSPITALIST

## 2017-11-06 RX ORDER — ASPIRIN 81 MG/1
81 TABLET ORAL DAILY
Status: DISCONTINUED | OUTPATIENT
Start: 2017-11-07 | End: 2017-11-08 | Stop reason: HOSPADM

## 2017-11-06 RX ADMIN — Medication 10 ML: at 22:41

## 2017-11-06 RX ADMIN — FLECAINIDE ACETATE 50 MG: 100 TABLET ORAL at 08:03

## 2017-11-06 RX ADMIN — PREDNISONE 5 MG: 5 TABLET ORAL at 08:04

## 2017-11-06 RX ADMIN — OYSTER SHELL CALCIUM WITH VITAMIN D 1 TABLET: 500; 200 TABLET, FILM COATED ORAL at 11:31

## 2017-11-06 RX ADMIN — FLECAINIDE ACETATE 50 MG: 100 TABLET ORAL at 22:31

## 2017-11-06 RX ADMIN — OYSTER SHELL CALCIUM WITH VITAMIN D 1 TABLET: 500; 200 TABLET, FILM COATED ORAL at 16:20

## 2017-11-06 RX ADMIN — HYDROCHLOROTHIAZIDE: 25 TABLET ORAL at 22:32

## 2017-11-06 RX ADMIN — OXYCODONE HYDROCHLORIDE 5 MG: 5 TABLET ORAL at 08:03

## 2017-11-06 RX ADMIN — ENOXAPARIN SODIUM 30 MG: 30 INJECTION SUBCUTANEOUS at 11:30

## 2017-11-06 RX ADMIN — ATORVASTATIN CALCIUM 40 MG: 40 TABLET, FILM COATED ORAL at 08:03

## 2017-11-06 RX ADMIN — TRAMADOL HYDROCHLORIDE 50 MG: 50 TABLET, FILM COATED ORAL at 04:28

## 2017-11-06 RX ADMIN — Medication 5 ML: at 04:28

## 2017-11-06 RX ADMIN — ACETAMINOPHEN 650 MG: 325 TABLET ORAL at 22:32

## 2017-11-06 RX ADMIN — OXYCODONE HYDROCHLORIDE 5 MG: 5 TABLET ORAL at 02:00

## 2017-11-06 RX ADMIN — ACETAMINOPHEN 650 MG: 325 TABLET ORAL at 14:24

## 2017-11-06 RX ADMIN — TRAMADOL HYDROCHLORIDE 50 MG: 50 TABLET, FILM COATED ORAL at 19:11

## 2017-11-06 RX ADMIN — OXYCODONE HYDROCHLORIDE 5 MG: 5 TABLET ORAL at 22:32

## 2017-11-06 RX ADMIN — OYSTER SHELL CALCIUM WITH VITAMIN D 1 TABLET: 500; 200 TABLET, FILM COATED ORAL at 08:04

## 2017-11-06 RX ADMIN — MESALAMINE 800 MG: 800 TABLET, DELAYED RELEASE ORAL at 08:03

## 2017-11-06 RX ADMIN — ACETAMINOPHEN 650 MG: 325 TABLET ORAL at 04:28

## 2017-11-06 RX ADMIN — OXYCODONE HYDROCHLORIDE 5 MG: 5 TABLET ORAL at 16:21

## 2017-11-06 RX ADMIN — SODIUM CHLORIDE, SODIUM LACTATE, POTASSIUM CHLORIDE, AND CALCIUM CHLORIDE 75 ML/HR: 600; 310; 30; 20 INJECTION, SOLUTION INTRAVENOUS at 02:03

## 2017-11-06 RX ADMIN — DILTIAZEM HYDROCHLORIDE 180 MG: 180 CAPSULE, COATED, EXTENDED RELEASE ORAL at 16:21

## 2017-11-06 RX ADMIN — ERGOCALCIFEROL 50000 UNITS: 1.25 CAPSULE ORAL at 08:03

## 2017-11-06 RX ADMIN — MESALAMINE 800 MG: 800 TABLET, DELAYED RELEASE ORAL at 17:17

## 2017-11-06 RX ADMIN — CEFAZOLIN SODIUM 1 G: 1 INJECTION, POWDER, FOR SOLUTION INTRAMUSCULAR; INTRAVENOUS at 02:02

## 2017-11-06 RX ADMIN — DULOXETINE HYDROCHLORIDE 30 MG: 30 CAPSULE, DELAYED RELEASE ORAL at 16:20

## 2017-11-06 NOTE — PROGRESS NOTES
Problem: Self Care Deficits Care Plan (Adult)  Goal: *Acute Goals and Plan of Care (Insert Text)  1. Patient will verbalize and demonstrate understanding of hip precautions with 100% accuracy during ADL. 2. Patient will complete functional transfers with supervision and adaptive equipment as needed. 3. Patient will complete lower body bathing and dressing with setup and adaptive equipment as needed. 4. Patient will complete toileting with supervision. 5. Patient will tolerate at least 20 minutes of BUE therapeutic exercises to increase strength in BUE to aid in functional transfers. 6. Patient will tolerate at least 20 minutes of OT treatment with no rest breaks to increase activity tolerance for ADLs. Timeframe: 7 visits        OCCUPATIONAL THERAPY: Initial Assessment, Daily Note and Treatment Day: 1st 11/6/2017  INPATIENT: Hospital Day: 2  [place] / [place] / Product Type: Medicare /      NAME/AGE/GENDER: Salas Vences is a 80 y.o. male   PRIMARY DIAGNOSIS:  left hip fracture  Closed right hip fracture (HCC)  Left femoral neck fracture Closed left hip fracture (HCC) Closed left hip fracture (HCC)  Procedure(s) (LRB):  HIP HEMIARTHROPLASTY LEFT (Left)  1 Day Post-Op  ICD-10: Treatment Diagnosis:    · Pain in left hip (M25.552)  · Stiffness of Left Hip, Not elsewhere classified (M25.652)  · History of falling (Z91.81)   Precautions/Allergies:    WBAT LLE   Hip Precautions   Ambien [zolpidem] and Codeine      ASSESSMENT:     Mr. Sadia Elias is a 80year old male who is now s/p above procedure after fall and is WBAT in LLE. Patient lives with wife and is typically with all ADLs and IADLs (except driving). Patient supine in bed upon arrival, agreeable to OT evaluation. Reports pain 6/10 at rest, increases to 8/10 with activity.  RN already administered pain meds prior to therapist's arrival. Patient required moderate assistance with bed mobility, CGA to stand, and CGA to take steps from bed to chair. Patient was educated on hip precautions and adaptive equipment for lower body ADLs (reacher, sock aid, long handled sponge, long handled shoe horn). Patient practiced lower body dressing using equipment and adaptive technique. See below for assistance required. Patient is currently functioning below his independent baseline and would benefit from continued OT to increase independence and safety. Will follow. This section established at most recent assessment   PROBLEM LIST (Impairments causing functional limitations):  1. Decreased ADL/Functional Activities  2. Decreased Transfer Abilities  3. Decreased Ambulation Ability/Technique  4. Decreased Balance  5. Increased Pain  6. Decreased Flexibility/Joint Mobility  7. Decreased Knowledge of Precautions   INTERVENTIONS PLANNED: (Benefits and precautions of occupational therapy have been discussed with the patient.)  1. Activities of daily living training  2. Adaptive equipment training  3. Donning&doffing training  4. Group therapy  5. Therapeutic activity  6. Therapeutic exercise     TREATMENT PLAN: Frequency/Duration: Follow patient 6x/ week to address above goals. Rehabilitation Potential For Stated Goals: Good     RECOMMENDED REHABILITATION/EQUIPMENT: (at time of discharge pending progress): Due to the probability of continued deficits (see above) this patient will likely need continued skilled occupational therapy after discharge. Equipment:    Walkers, Type: Rolling Walker              OCCUPATIONAL PROFILE AND HISTORY:   History of Present Injury/Illness (Reason for Referral):  Per H&P, \"80 y/o gentleman with prior hx CAD, PAF (last in 2016, on asa), HTN, renal cell carcinoma, s/p left nephrectomy March 2017, prostate cancer, s/p RALP 1/2017 presents to the ED tonight with persistent left hip pain and inability to walk or weight bear.  Friday morning he was trying to get over a dog gate when he slipped and fell onto his left hip and shoulder. Went to [facility] and xrays were negative for fracture. He did not sustain any head injury or LOC. Tried to weight-bear or ambulate tonight with a walker but could not so came to [facility] ED. xrays tonight reveal subcapital left femoral neck fracture. He will be transferred to [facility] for definitive surgical repair. He denies any recent cardiac complaints, no chest pain, palpitations, shortness of breath, fever or chills. No bleeding tendencies. He is hemodynamically stable for transfer tonight via EMS. \"  Past Medical History/Comorbidities:   Mr. Chhaya Acuña  has a past medical history of Anxiety; Arthritis; Atrial fibrillation ([address] BPH (benign prostatic hypertrophy); CAD (coronary artery disease); Cancer ([address] Chronic kidney disease; Chronic pain; Elevated PSA; Erectile dysfunction; GERD (gastroesophageal reflux disease); High blood pressure (10/26/2017); High cholesterol (10/26/2017); Hypercholesteremia; Hypertension; Macular degeneration; Personal history of prostate cancer; Psychiatric disorder; Spondylitis ([address] Ulcerative colitis ([address] Ulcerative colitis ([address] Vitamin D deficiency; and Wears dentures. Mr. Chhaya Acuña  has a past surgical history that includes colonoscopy; appendectomy (age 30's); orthopaedic (Right); hernia repair (Bilateral, 2013); lap cholecystectomy (2013); hernia repair; cataract removal (Bilateral); retinal detachment repair; and nephrectomy (Left, 2017). Social History/Living Environment:   Home Environment: Private residence  # Steps to Enter: 1  One/Two Story Residence: One story  Living Alone: No  Support Systems: Spouse/Significant Other/Partner  Patient Expects to be Discharged to[de-identified] Unknown  Current DME Used/Available at Home: None  Tub or Shower Type: Shower  Prior Level of Function/Work/Activity:  Patient lives with wife and is typically independent with ADLs, IADLs, and ambulation. He does not drive due to vision.    Personal Factors:          Sex:  male        Age: 80 y.o. Profession:  Retired    Number of Personal Factors/Comorbidities that affect the Plan of Care: Brief history (0):  LOW COMPLEXITY   ASSESSMENT OF OCCUPATIONAL PERFORMANCE[de-identified]   Activities of Daily Living:           Basic ADLs (From Assessment) Complex ADLs (From Assessment)   Basic ADL  Feeding: Setup  Oral Facial Hygiene/Grooming: Setup  Bathing: Moderate assistance  Upper Body Dressing: Setup  Lower Body Dressing: Moderate assistance  Toileting: Minimum assistance Instrumental ADL  Meal Preparation: Maximum assistance  Homemaking: Maximum assistance  Medication Management: Independent  Financial Management: Independent   Grooming/Bathing/Dressing Activities of Daily Living     Cognitive Retraining  Safety/Judgement: Awareness of environment; Fall prevention; Insight into deficits                     Lower Body Dressing Assistance  Pants With Elastic Waist: Minimum assistance; Compensatory technique training  Socks: Minimum assistance; Compensatory technique training  Leg Crossed Method Used: No  Position Performed: Seated in chair;Standing  Cues: Don;Doff;Physical assistance;Verbal cues provided;Visual cues provided  Adaptive Equipment Used: Reacher;Sock aid; Walker Bed/Mat Mobility  Supine to Sit: Moderate assistance  Sit to Stand: Contact guard assistance  Bed to Chair: Contact guard assistance  Scooting: Contact guard assistance       Most Recent Physical Functioning:   Gross Assessment:  AROM: Within functional limits (BUE)  Strength:  Within functional limits (BUE)  Coordination: Within functional limits (BUE)               Posture:     Balance:  Sitting: Intact  Standing: Impaired  Standing - Static: Fair (+)  Standing - Dynamic : Fair Bed Mobility:  Supine to Sit: Moderate assistance  Scooting: Contact guard assistance  Wheelchair Mobility:     Transfers:  Sit to Stand: Contact guard assistance  Stand to Sit: Contact guard assistance  Bed to Chair: Contact guard assistance                Patient Vitals for the past 6 hrs:   BP BP Patient Position SpO2 Pulse   17 0416 149/69 At rest 92 % 82   17 0804 159/76 At rest 92 % 80       Mental Status  Neurologic State: Alert  Orientation Level: Oriented X4  Cognition: Follows commands  Perception: Appears intact  Perseveration: No perseveration noted  Safety/Judgement: Awareness of environment, Fall prevention, Insight into deficits                          Physical Skills Involved:  1. Range of Motion  2. Balance  3. Strength  4. Activity Tolerance  5. Pain (acute) Cognitive Skills Affected (resulting in the inability to perform in a timely and safe manner):  1. None Psychosocial Skills Affected:  1. Habits/Routines  2. Environmental Adaptation   Number of elements that affect the Plan of Care: 5+:  HIGH COMPLEXITY   CLINICAL DECISION MAKIN[address] [place] [address] AM-PAC 6 Clicks   Daily Activity Inpatient Short Form  How much help from another person does the patient currently need. .. Total A Lot A Little None   1. Putting on and taking off regular lower body clothing? [] 1   [x] 2   [] 3   [] 4   2. Bathing (including washing, rinsing, drying)? [] 1   [x] 2   [] 3   [] 4   3. Toileting, which includes using toilet, bedpan or urinal?   [] 1   [x] 2   [] 3   [] 4   4. Putting on and taking off regular upper body clothing? [] 1   [] 2   [x] 3   [] 4   5. Taking care of personal grooming such as brushing teeth? [] 1   [] 2   [x] 3   [] 4   6. Eating meals? [] 1   [] 2   [x] 3   [] 4   © , Trustees of [address] [place] [address], under license to [employer]. All rights reserved      Score:  Initial: 15 Most Recent: X (Date: -- )    Interpretation of Tool:  Represents activities that are increasingly more difficult (i.e. Bed mobility, Transfers, Gait). Score 24 23 22-20 19-15 14-10 9-7 6     Modifier CH CI CJ CK CL CM CN      ?  Self Care:     - CURRENT STATUS: CK - 40%-59% impaired, limited or restricted    - GOAL STATUS: CJ - 20%-39% impaired, limited or restricted    - D/C STATUS:  ---------------To be determined---------------  [place] / [place] / Product Type: Medicare /      Medical Necessity:     · Patient demonstrates good rehab potential due to higher previous functional level. Reason for Services/Other Comments:  · Patient continues to require present interventions due to patient's inability to care for self while maintaining hip precautions. Use of outcome tool(s) and clinical judgement create a POC that gives a: MODERATE COMPLEXITY         TREATMENT:   (In addition to Assessment/Re-Assessment sessions the following treatments were rendered)     Pre-treatment Symptoms/Complaints:  none  Pain: Initial:   Pain Intensity 1: 6 (6 before, 8 after therapy)  Pain Intervention(s) 1: Elevation, Nurse notified  Post Session:  8/10. Rn already gave pain meds prior to therapy session     Self Care: (8 minutes): Procedure(s) (per grid) utilized to improve and/or restore self-care/home management as related to lower body dressing. Required moderate visual and verbal cueing to facilitate activities of daily living skills, compensatory activities and use of adaptive equipment (reacher, sock aid) . Braces/Orthotics/Lines/Etc:   · IV  · LLE knee immobilizer  · O2 Device: Nasal cannula  Treatment/Session Assessment:    · Response to Treatment:  Tolerated well   · Interdisciplinary Collaboration:   o Occupational Therapist  o Registered Nurse  · After treatment position/precautions:   o Up in chair  o Bed alarm/tab alert on  o Bed/Chair-wheels locked  o Call light within reach  o RN notified  o Family at bedside   · Compliance with Program/Exercises: compliant all of the time. · Recommendations/Intent for next treatment session: \"Next visit will focus on advancements to more challenging activities and reduction in assistance provided\".   Total Treatment Duration:  OT Patient Time In/Time Out  Time In: 4182  Time Out: [address] [place], OTR/L

## 2017-11-06 NOTE — PROGRESS NOTES
Problem: Falls - Risk of  Goal: *Absence of Falls  Document Cedric Fall Risk and appropriate interventions in the flowsheet.    Outcome: Progressing Towards Goal  Fall Risk Interventions:  Mobility Interventions: Bed/chair exit alarm, OT consult for ADLs, PT Consult for mobility concerns, Utilize walker, cane, or other assitive device         Medication Interventions: Bed/chair exit alarm, Evaluate medications/consider consulting pharmacy, Patient to call before getting OOB    Elimination Interventions: Bed/chair exit alarm, Call light in reach, Patient to call for help with toileting needs    History of Falls Interventions: Consult care management for discharge planning

## 2017-11-06 NOTE — PROGRESS NOTES
PM&R Consult Progress Note      Patient: Sean Coburn  Admit Date: 11/5/2017  Admit Diagnosis: left hip fracture;Closed right hip fracture ([address] Left femo*  Recommendations: Continue Acute Rehab Program, Coordination of rehab/medical care, Counseling of PM & R care issues management, Home/Outpatient Rehab  -patient and wife still wanting HH over SNF. \"we will see how it goes. \"  He did surprisingly very well post op ; was CGA with STS and amb 5ft RW CGA. Today, he is having more pain affecting mobility  -cont PT/OT; overall, good potential  History/Subjective/Complaint:     Patient seen and examined. Records reviewed. States he is ok.     Pain 1  Pain Scale 1: Numeric (0 - 10) (11/06/17 0908)  Pain Intensity 1: 6 (6 before, 8 after therapy) (11/06/17 0908)  Patient Stated Pain Goal: 0 (11/05/17 1542)  Pain Reassessment 1: Patient sleeping (11/06/17 0526)  Pain Onset 1: post op (11/06/17 0428)  Pain Location 1: Hip (11/06/17 0428)  Pain Orientation 1: Left (11/06/17 0428)  Pain Description 1: Aching (11/06/17 0428)  Pain Intervention(s) 1: Elevation;Nurse notified (11/06/17 0908)     Objective:     Vitals:  Patient Vitals for the past 8 hrs:   BP Temp Pulse Resp SpO2   11/06/17 0804 159/76 98.7 °F (37.1 °C) 80 18 92 %   11/06/17 0416 149/69 98.6 °F (37 °C) 82 17 92 %      Intake and Output:  11/04 1901 - 11/06 0700  In: 5482 [P.O.:30; I.V.:1075]  Out: 825 [Urine:575]    Allergies   Allergen Reactions    Ambien [Zolpidem] Other (comments)     Confusion      Codeine Other (comments)     \"jittery\"      Current Facility-Administered Medications   Medication Dose Route Frequency    atorvastatin (LIPITOR) tablet 40 mg  40 mg Oral DAILY    dilTIAZem CD (CARDIZEM CD) capsule 180 mg  180 mg Oral DAILY WITH DINNER    ergocalciferol (ERGOCALCIFEROL) capsule 50,000 Units  50,000 Units Oral every Monday    flecainide (TAMBOCOR) tablet 50 mg  50 mg Oral Q12H    DULoxetine (CYMBALTA) capsule 30 mg  30 mg Oral DAILY WITH DINNER  HYDROcodone-acetaminophen (NORCO) 7.5-325 mg per tablet 2 Tab  2 Tab Oral Q4H PRN    mesalamine DR (ASACOL HD) tablet 800 mg  800 mg Oral BID    predniSONE (DELTASONE) tablet 5 mg  5 mg Oral DAILY WITH BREAKFAST    hydrALAZINE (APRESOLINE) 20 mg/mL injection 10 mg  10 mg IntraVENous Q6H PRN    losartan/hydroCHLOROthiazide (HYZAAR) 100/25 mg   Oral QHS    0.9% sodium chloride infusion 2,000 mL  2,000 mL IntraVENous CONTINUOUS    HYDROmorphone (PF) (DILAUDID) injection 0.5 mg  0.5 mg IntraVENous Q4H PRN    oxyCODONE IR (ROXICODONE) tablet 5 mg  5 mg Oral Q4H PRN    traMADol (ULTRAM) tablet 50 mg  50 mg Oral Q6H PRN    lactated Ringers infusion  75 mL/hr IntraVENous CONTINUOUS    sodium chloride (NS) flush 5-10 mL  5-10 mL IntraVENous Q8H    sodium chloride (NS) flush 5-10 mL  5-10 mL IntraVENous PRN    ondansetron (ZOFRAN) injection 4 mg  4 mg IntraVENous Q4H PRN    alum-mag hydroxide-simeth (MYLANTA) oral suspension 30 mL  30 mL Oral Q4H PRN    calcium-vitamin D (OS-DMITRIY) 500 mg-200 unit tablet  1 Tab Oral TID WITH MEALS    acetaminophen (TYLENOL) tablet 650 mg  650 mg Oral Q8H    oxyCODONE IR (ROXICODONE) tablet 5 mg  5 mg Oral Q4H PRN    enoxaparin (LOVENOX) injection 30 mg  30 mg SubCUTAneous Q24H    lidocaine (XYLOCAINE) 10 mg/mL (1 %) injection 0.1 mL  0.1 mL SubCUTAneous PRN    lactated Ringers infusion  75 mL/hr IntraVENous CONTINUOUS    sodium chloride (NS) flush 5-10 mL  5-10 mL IntraVENous PRN    celecoxib (CELEBREX) capsule 200 mg  200 mg Oral ONCE PRN    midazolam (VERSED) injection 2 mg  2 mg IntraVENous ONCE PRN       Physical Exam:  No significant changes    Incision(s)/Wound(s):    Wound Hip Left (Active)   DRESSING STATUS Clean, dry, and intact 11/6/2017 12:38 AM   DRESSING TYPE Elastic bandage 11/6/2017 12:38 AM   SPLINT TYPE/MATERIAL Knee immobilizer 11/6/2017 12:38 AM   Drainage Amount  None 11/5/2017  1:57 PM   Wound Odor None 11/5/2017  1:57 PM   Number of days:1 [REMOVED] Wound Hip Left (Removed)   Removed 11/05/17 1207   Number of days:0          Functional Assessment:  Gross Assessment  AROM: Within functional limits (BUE) (11/06/17 0908)  Strength:  Within functional limits (BUE) (11/06/17 0908)  Coordination: Within functional limits (BUE) (11/06/17 0908)     Gait  Step Length: Left shortened;Right shortened (11/05/17 1600)  Distance (ft): 5 Feet (ft) (11/05/17 1600)  Assistive Device: Walker, rolling (11/05/17 1600)     Bed Mobility  Supine to Sit: Moderate assistance (11/06/17 0908)  Scooting: Contact guard assistance (11/06/17 0908)     Balance  Sitting: Intact (11/06/17 0908)  Sitting - Static: Good (unsupported) (11/05/17 1600)  Sitting - Dynamic: Fair (occasional) (11/05/17 1600)  Standing: Impaired (11/06/17 0908)  Standing - Static: Fair (+) (11/06/17 0908)  Standing - Dynamic : Fair (11/06/17 0908)                       Bed/Mat Mobility  Supine to Sit: Moderate assistance (11/06/17 0908)  Sit to Stand: Contact guard assistance (11/06/17 0908)  Bed to Chair: Contact guard assistance (11/06/17 0908)  Scooting: Contact guard assistance (11/06/17 0908)     Labs/Studies:  Recent Results (from the past 72 hour(s))   CBC W/O DIFF    Collection Time: 11/05/17  2:10 AM   Result Value Ref Range    WBC 11.9 (H) 4.3 - 11.1 K/uL    RBC 3.36 (L) 4.23 - 5.67 M/uL    HGB 10.9 (L) 13.6 - 17.2 g/dL    HCT 32.1 (L) 41.1 - 50.3 %    MCV 95.5 79.6 - 97.8 FL    MCH 32.4 26.1 - 32.9 PG    MCHC 34.0 31.4 - 35.0 g/dL    RDW 14.2 11.9 - 14.6 %    PLATELET 381 991 - 130 K/uL    MPV 9.6 (L) 10.8 - 79.5 FL   METABOLIC PANEL, BASIC    Collection Time: 11/05/17  2:10 AM   Result Value Ref Range    Sodium 137 136 - 145 mmol/L    Potassium 4.8 3.5 - 5.1 mmol/L    Chloride 104 98 - 107 mmol/L    CO2 23 21 - 32 mmol/L    Anion gap 10 7 - 16 mmol/L    Glucose 104 (H) 65 - 100 mg/dL    BUN 51 (H) 8 - 23 MG/DL    Creatinine 3.10 (H) 0.8 - 1.5 MG/DL    GFR est AA 26 (L) >60 ml/min/1.73m2    GFR est non-AA 21 (L) >60 ml/min/1.73m2    Calcium 8.9 8.3 - 10.4 MG/DL   PROTHROMBIN TIME + INR    Collection Time: 11/05/17  2:10 AM   Result Value Ref Range    Prothrombin time 11.0 9.6 - 12.0 sec    INR 1.0 0.9 - 1.2     EKG, 12 LEAD, INITIAL    Collection Time: 11/05/17  4:04 AM   Result Value Ref Range    Ventricular Rate 78 BPM    Atrial Rate 78 BPM    P-R Interval 204 ms    QRS Duration 84 ms    Q-T Interval 376 ms    QTC Calculation (Bezet) 428 ms    Calculated P Axis 42 degrees    Calculated R Axis 47 degrees    Calculated T Axis 50 degrees    Diagnosis       Normal sinus rhythm  Normal ECG  When compared with ECG of 22-MAR-2017 13:27,  No significant change was found  Confirmed by Fox Aguillon ([id number]) on 11/6/2017 5:28:42 AM     MSSA/MRSA SC BY PCR, NASAL SWAB    Collection Time: 11/05/17  4:40 AM   Result Value Ref Range    Special Requests: NO SPECIAL REQUESTS      Culture result:        SA target not detected. A MRSA NEGATIVE, SA NEGATIVE test result does not preclude MRSA or SA nasal colonization.    URINALYSIS W/ RFLX MICROSCOPIC    Collection Time: 11/05/17  6:52 AM   Result Value Ref Range    Color YELLOW      Appearance CLOUDY      Specific gravity 1.014 1.001 - 1.023      pH (UA) 5.5 5.0 - 9.0      Protein TRACE (A) NEG mg/dL    Glucose NEGATIVE  mg/dL    Ketone NEGATIVE  NEG mg/dL    Bilirubin NEGATIVE  NEG      Blood TRACE (A) NEG      Urobilinogen 0.2 0.2 - 1.0 EU/dL    Nitrites NEGATIVE  NEG      Leukocyte Esterase NEGATIVE  NEG      WBC 0-3 0 /hpf    RBC 0-3 0 /hpf    Epithelial cells 0-3 0 /hpf    Bacteria 0 0 /hpf    Amorphous Crystals TRACE 0     MAGNESIUM    Collection Time: 11/05/17  9:23 AM   Result Value Ref Range    Magnesium 2.3 1.8 - 2.4 mg/dL   PHOSPHORUS    Collection Time: 11/05/17  9:23 AM   Result Value Ref Range    Phosphorus 2.9 2.3 - 3.7 MG/DL   TYPE & SCREEN    Collection Time: 11/05/17  9:23 AM   Result Value Ref Range    Crossmatch Expiration 11/08/2017     ABO/Rh(D) A POSITIVE     Antibody screen NEG    PTH INTACT    Collection Time: 11/05/17  9:23 AM   Result Value Ref Range    Calcium 8.2 (L) 8.3 - 10.4 MG/DL    PTH, Intact 203.6 (H) 14.0 - 72.0 pg/mL   PREALBUMIN    Collection Time: 11/05/17  9:23 AM   Result Value Ref Range    Prealbumin 33.0 18.0 - 35.7 MG/DL   PTT    Collection Time: 11/05/17  9:23 AM   Result Value Ref Range    aPTT 24.4 23.5 - 31.7 SEC   PLEASE READ & DOCUMENT PPD TEST IN 24 HRS    Collection Time: 11/06/17  5:25 AM   Result Value Ref Range    PPD Negative Negative    mm Induration 0 mm   CBC WITH AUTOMATED DIFF    Collection Time: 11/06/17  6:59 AM   Result Value Ref Range    WBC 9.2 4.3 - 11.1 K/uL    RBC 2.84 (L) 4.23 - 5.67 M/uL    HGB 9.1 (L) 13.6 - 17.2 g/dL    HCT 27.8 (L) 41.1 - 50.3 %    MCV 97.9 (H) 79.6 - 97.8 FL    MCH 32.0 26.1 - 32.9 PG    MCHC 32.7 31.4 - 35.0 g/dL    RDW 14.3 11.9 - 14.6 %    PLATELET 401 626 - 127 K/uL    MPV 9.1 (L) 10.8 - 14.1 FL    DF AUTOMATED      NEUTROPHILS 77 43 - 78 %    LYMPHOCYTES 15 13 - 44 %    MONOCYTES 7 4.0 - 12.0 %    EOSINOPHILS 1 0.5 - 7.8 %    BASOPHILS 0 0.0 - 2.0 %    IMMATURE GRANULOCYTES 0 0.0 - 5.0 %    ABS. NEUTROPHILS 7.0 1.7 - 8.2 K/UL    ABS. LYMPHOCYTES 1.4 0.5 - 4.6 K/UL    ABS. MONOCYTES 0.7 0.1 - 1.3 K/UL    ABS. EOSINOPHILS 0.1 0.0 - 0.8 K/UL    ABS. BASOPHILS 0.0 0.0 - 0.2 K/UL    ABS. IMM.  GRANS. 0.0 0.0 - 0.5 K/UL        Assessment:     Principal Problem:    Closed left hip fracture (HCC) (11/5/2017)    Active Problems:    BPH (benign prostatic hyperplasia) (7/23/2015)      Paroxysmal atrial fibrillation (HCC) (4/13/2017)      High blood pressure (10/26/2017)      Chronic kidney disease (11/5/2017)      Closed right hip fracture ([facility] (11/5/2017)        Plan:     Recommendations: Continue Acute Rehab Program  Coordination of rehab/medical care  Counseling of PM & R care issues management  Monitoring and management of medical conditions per plan of care/orders  Discussion with Family/Caregiver/Staff  Reviewed Therapies/Labs/Medications/Records    Signed By:  Alla Valdez MD     November 6, 2017

## 2017-11-06 NOTE — PROGRESS NOTES
Problem: Mobility Impaired (Adult and Pediatric)  Goal: *Acute Goals and Plan of Care (Insert Text)  1. Mr. Mario Lizama will perform supine to sit and sit to supine independently in 5 days. 2.  Mr. Mario Lizama will perform sit to stand and bed to chair independently with least restrictive device in 5 days. 3.  Mr. Mario Lizama will perform gait with least restrictive device 200 ft independently in 5 days. 4.  Mr. Mario Lizama will maintain hip precautions in 5 days. 5.  Mr. Mario Lizama will go up and down 1 step independently with rolling walker in 5 days. 6.  Mr. Mario Lizama will perform therex to left lower extremity x 10 reps active range of motion in 5 days. PHYSICAL THERAPY: Daily Note, Treatment Day: 1st, AM 11/6/2017  INPATIENT: Hospital Day: 2  [place] / Product Type: Medicare /      NAME/AGE/GENDER: Joe Smart is a 80 y.o. male   PRIMARY DIAGNOSIS: left hip fracture  Closed right hip fracture (HCC)  Left femoral neck fracture Closed left hip fracture (HCC) Closed left hip fracture (HCC)  Procedure(s) (LRB):  HIP HEMIARTHROPLASTY LEFT (Left)  1 Day Post-Op  ICD-10: Treatment Diagnosis:   · Generalized Muscle Weakness (M62.81)  · Difficulty in walking, Not elsewhere classified (R26.2)  · History of falling (Z91.81)   Precaution/Allergies:  Ambien [zolpidem] and Codeine      ASSESSMENT:     Mr. Mario Lizama presents doing remarkably well post op from hemiarthroplasty for hip fracture. He was sitting up in the chair on contact. Stood and ambulated ~15' WBAT L with CGA. Returned to chair. Pt was taken to group therapy in the gym on 7th floor where he was able to participate in group activities including LE ex as listed below to improve mobility, strength and balance . Returned to room via chair. This section established at most recent assessment   PROBLEM LIST (Impairments causing functional limitations):  1. Decreased Strength  2. Decreased Transfer Abilities  3.  Decreased Ambulation Ability/Technique  4. Decreased Activity Tolerance   INTERVENTIONS PLANNED: (Benefits and precautions of physical therapy have been discussed with the patient.)  1. Bed Mobility  2. Gait Training  3. Therapeutic Activites  4. Transfer Training  5. Group Therapy     TREATMENT PLAN: Frequency/Duration: twice daily for duration of hospital stay  Rehabilitation Potential For Stated Goals: Good     RECOMMENDED REHABILITATION/EQUIPMENT: (at time of discharge pending progress): Due to the probability of continued deficits (see above) this patient will likely need continued skilled physical therapy after discharge. Equipment:   [employer], Type: Rolling Walker              HISTORY:   History of Present Injury/Illness (Reason for Referral):  81 y/o gentleman with prior hx CAD, PAF (last in 2016, on asa), HTN, renal cell carcinoma, s/p left nephrectomy March 2017, prostate cancer, s/p RALP 1/2017 presents to the ED tonight with persistent left hip pain and inability to walk or weight bear. Friday morning he was trying to get over a dog gate when he slipped and fell onto his left hip and shoulder. Went to [facility] and xrays were negative for fracture. He did not sustain any head injury or LOC. Tried to weight-bear or ambulate tonight with a walker but could not so came to [facility] ED. xrays tonight reveal subcapital left femoral neck fracture. He will be transferred to [facility] for definitive surgical repair. He denies any recent cardiac complaints, no chest pain, palpitations, shortness of breath, fever or chills. No bleeding tendencies. He is hemodynamically stable for transfer tonight via EMS. Past Medical History/Comorbidities:   Mr. Sadia Elias  has a past medical history of Anxiety; Arthritis; Atrial fibrillation ([address] BPH (benign prostatic hypertrophy); CAD (coronary artery disease); Cancer ([address] Chronic kidney disease; Chronic pain; Elevated PSA; Erectile dysfunction; GERD (gastroesophageal reflux disease);  High blood pressure (10/26/2017); High cholesterol (10/26/2017); Hypercholesteremia; Hypertension; Macular degeneration; Personal history of prostate cancer; Psychiatric disorder; Spondylitis ([address] Ulcerative colitis ([address] Ulcerative colitis ([address] Vitamin D deficiency; and Wears dentures. Mr. Arthur Flores  has a past surgical history that includes colonoscopy; appendectomy (age 30's); orthopaedic (Right); hernia repair (Bilateral, 2013); lap cholecystectomy (2013); hernia repair; cataract removal (Bilateral); retinal detachment repair; and nephrectomy (Left, 2017). Social History/Living Environment:   Home Environment: Private residence  # Steps to Enter: 1  One/Two Story Residence: One story  Living Alone: No  Support Systems: Spouse/Significant Other/Partner  Patient Expects to be Discharged to[de-identified] Unknown  Current DME Used/Available at Home: None  Tub or Shower Type: Shower  Prior Level of Function/Work/Activity:  independent  age   Number of Personal Factors/Comorbidities that affect the Plan of Care: 1-2: MODERATE COMPLEXITY   EXAMINATION:   Most Recent Physical Functioning:   Gross Assessment:                  Posture:  Posture (WDL): Exceptions to WDL  Posture Assessment: Forward head  Balance:  Sitting: Intact  Standing: Impaired  Standing - Static: Fair (+)  Standing - Dynamic : Fair Bed Mobility:     Wheelchair Mobility:     Transfers:  Sit to Stand: Contact guard assistance  Stand to Sit: Contact guard assistance  Gait:  Left Side Weight Bearing: As tolerated  Step Length: Left shortened;Right shortened  Distance (ft): 15 Feet (ft)  Assistive Device: Walker, rolling  Ambulation - Level of Assistance: Contact guard assistance      Body Structures Involved:  1. Muscles Body Functions Affected:  1. Movement Related Activities and Participation Affected:  1.  Mobility   Number of elements that affect the Plan of Care: 3: MODERATE COMPLEXITY   CLINICAL PRESENTATION:   Presentation: Evolving clinical presentation with changing clinical characteristics: MODERATE COMPLEXITY   CLINICAL DECISION MAKIN[address] [place] [id number] AM-PAC 6 Clicks   Basic Mobility Inpatient Short Form  How much difficulty does the patient currently have. .. Unable A Lot A Little None   1. Turning over in bed (including adjusting bedclothes, sheets and blankets)? [] 1   [] 2   [x] 3   [] 4   2. Sitting down on and standing up from a chair with arms ( e.g., wheelchair, bedside commode, etc.)   [] 1   [] 2   [x] 3   [] 4   3. Moving from lying on back to sitting on the side of the bed? [] 1   [x] 2   [] 3   [] 4   How much help from another person does the patient currently need. .. Total A Lot A Little None   4. Moving to and from a bed to a chair (including a wheelchair)? [] 1   [] 2   [x] 3   [] 4   5. Need to walk in hospital room? [] 1   [] 2   [x] 3   [] 4   6. Climbing 3-5 steps with a railing? [] 1   [x] 2   [] 3   [] 4   © , Trustees of [address] [place] [address], under license to [employer]. All rights reserved      Score:  Initial: 16 Most Recent: X (Date: -- )    Interpretation of Tool:  Represents activities that are increasingly more difficult (i.e. Bed mobility, Transfers, Gait). Score 24 23 22-20 19-15 14-10 9-7 6     Modifier CH CI CJ CK CL CM CN      ? Mobility - Walking and Moving Around:     - CURRENT STATUS: CK - 40%-59% impaired, limited or restricted    - GOAL STATUS: CK - 40%-59% impaired, limited or restricted    - D/C STATUS:  ---------------To be determined---------------  [place] / Product Type: Medicare /      Medical Necessity:     · Patient is expected to demonstrate progress in functional technique to increase independence with mobility and gait. Reason for Services/Other Comments:  · Patient continues to require present interventions due to patient's inability to function at baseline. .   Use of outcome tool(s) and clinical judgement create a POC that gives a: Questionable prediction of patient's progress: MODERATE COMPLEXITY            TREATMENT:   (In addition to Assessment/Re-Assessment sessions the following treatments were rendered)   Pre-treatment Symptoms/Complaints:  none  Pain: Initial:     5 Post Session:  No change     Therapeutic Activity: (    10 min): Therapeutic activities including chair transfers, standing balance, gt on level surface to improve mobility, strength and balance. Required min   to promote dynamic balance in standing. Group Therapeutic Exercise: (  10 min )  Exercises per grid below to improve mobility, strength and balance. Required min visual and verbal cues to promote proper body alignment, promote proper body posture and promote proper body mechanics. Date:  11/6/17 AM Date:   Date:     Activity/Exercise Seated Parameters Parameters Parameters   Heel raises X 20 B     Toe raises X 20 B     LAQ's X 20 B     Hip Flex X 20 B     Hip ABD X 20 B                             Braces/Orthotics/Lines/Etc:   · IV  Treatment/Session Assessment:    · Response to Treatment:  good  · Interdisciplinary Collaboration:   o Registered Nurse  · After treatment position/precautions:   o Up in chair  o Call light within reach  o RN notified   · Compliance with Program/Exercises: Will assess as treatment progresses. · Recommendations/Intent for next treatment session: \"Next visit will focus on advancements to more challenging activities and reduction in assistance provided\".   Total Treatment Duration:PT Patient Time In/Time Out  Time In: 3021 (1120)  Time Out: 1040 (1130)    Ashley Valdes PTA

## 2017-11-06 NOTE — PROGRESS NOTES
Problem: Mobility Impaired (Adult and Pediatric)  Goal: *Acute Goals and Plan of Care (Insert Text)  1. Mr. Chantelle Jama will perform supine to sit and sit to supine independently in 5 days. 2.  Mr. Chantelle Jama will perform sit to stand and bed to chair independently with least restrictive device in 5 days. 3.  Mr. Chantelle Jama will perform gait with least restrictive device 200 ft independently in 5 days. 4.  Mr. Chantelle Jama will maintain hip precautions in 5 days. 5.  Mr. Chantelle Jama will go up and down 1 step independently with rolling walker in 5 days. 6.  Mr. Chantelle Jama will perform therex to left lower extremity x 10 reps active range of motion in 5 days. PHYSICAL THERAPY: Daily Note, Treatment Day: 1st, PM 11/6/2017  INPATIENT: Hospital Day: 2  [place] / Product Type: Medicare /      NAME/AGE/GENDER: Anish Jain is a 80 y.o. male   PRIMARY DIAGNOSIS: left hip fracture  Closed right hip fracture (HCC)  Left femoral neck fracture Closed left hip fracture (HCC) Closed left hip fracture (HCC)  Procedure(s) (LRB):  HIP HEMIARTHROPLASTY LEFT (Left)  1 Day Post-Op  ICD-10: Treatment Diagnosis:   · Generalized Muscle Weakness (M62.81)  · Difficulty in walking, Not elsewhere classified (R26.2)  · History of falling (Z91.81)   Precaution/Allergies:  Ambien [zolpidem] and Codeine      ASSESSMENT:     Mr. Chantelle Jama presents doing remarkably well post op from hemiarthroplasty for hip fracture. He was sitting up in the chair from AM treatment on contact. Stood and ambulated ~ 39' WBAT L with CGA. Returned to room and to bed. Min assist to get LE's up and positioned in bed. This section established at most recent assessment   PROBLEM LIST (Impairments causing functional limitations):  1. Decreased Strength  2. Decreased Transfer Abilities  3. Decreased Ambulation Ability/Technique  4.  Decreased Activity Tolerance   INTERVENTIONS PLANNED: (Benefits and precautions of physical therapy have been discussed with the patient.)  1. Bed Mobility  2. Gait Training  3. Therapeutic Activites  4. Transfer Training  5. Group Therapy     TREATMENT PLAN: Frequency/Duration: twice daily for duration of hospital stay  Rehabilitation Potential For Stated Goals: Good     RECOMMENDED REHABILITATION/EQUIPMENT: (at time of discharge pending progress): Due to the probability of continued deficits (see above) this patient will likely need continued skilled physical therapy after discharge. Equipment:   [employer], Type: Rolling Walker              HISTORY:   History of Present Injury/Illness (Reason for Referral):  79 y/o gentleman with prior hx CAD, PAF (last in 2016, on asa), HTN, renal cell carcinoma, s/p left nephrectomy March 2017, prostate cancer, s/p RALP 1/2017 presents to the ED tonight with persistent left hip pain and inability to walk or weight bear. Friday morning he was trying to get over a dog gate when he slipped and fell onto his left hip and shoulder. Went to [facility] and xrays were negative for fracture. He did not sustain any head injury or LOC. Tried to weight-bear or ambulate tonight with a walker but could not so came to [facility] ED. xrays tonight reveal subcapital left femoral neck fracture. He will be transferred to [facility] for definitive surgical repair. He denies any recent cardiac complaints, no chest pain, palpitations, shortness of breath, fever or chills. No bleeding tendencies. He is hemodynamically stable for transfer tonight via EMS. Past Medical History/Comorbidities:   Mr. Codey Cueto  has a past medical history of Anxiety; Arthritis; Atrial fibrillation ([address] BPH (benign prostatic hypertrophy); CAD (coronary artery disease); Cancer ([address] Chronic kidney disease; Chronic pain; Elevated PSA; Erectile dysfunction; GERD (gastroesophageal reflux disease); High blood pressure (10/26/2017); High cholesterol (10/26/2017); Hypercholesteremia;  Hypertension; Macular degeneration; Personal history of prostate cancer; Psychiatric disorder; Spondylitis ([facility] Ulcerative colitis ([facility] Ulcerative colitis ([facility] Vitamin D deficiency; and Wears dentures. Mr. Nancy Oglesby  has a past surgical history that includes colonoscopy; appendectomy (age 30's); orthopaedic (Right); hernia repair (Bilateral, 2013); lap cholecystectomy (2013); hernia repair; cataract removal (Bilateral); retinal detachment repair; and nephrectomy (Left, 2017). Social History/Living Environment:   Home Environment: Private residence  # Steps to Enter: 1  One/Two Story Residence: One story  Living Alone: No  Support Systems: Spouse/Significant Other/Partner  Patient Expects to be Discharged to[de-identified] Unknown  Current DME Used/Available at Home: None  Tub or Shower Type: Shower  Prior Level of Function/Work/Activity:  independent  age   Number of Personal Factors/Comorbidities that affect the Plan of Care: 1-2: MODERATE COMPLEXITY   EXAMINATION:   Most Recent Physical Functioning:   Gross Assessment:                  Posture:  Posture (WDL): Exceptions to WDL  Posture Assessment: Forward head  Balance:  Sitting: Intact  Standing: Impaired  Standing - Static: Fair (+)  Standing - Dynamic : Fair Bed Mobility:     Wheelchair Mobility:     Transfers:  Sit to Stand: Contact guard assistance  Stand to Sit: Contact guard assistance  Gait:  Left Side Weight Bearing: As tolerated  Step Length: Left shortened;Right shortened  Distance (ft): 45 Feet (ft)  Assistive Device: Walker, rolling  Ambulation - Level of Assistance: Contact guard assistance      Body Structures Involved:  1. Muscles Body Functions Affected:  1. Movement Related Activities and Participation Affected:  1.  Mobility   Number of elements that affect the Plan of Care: 3: MODERATE COMPLEXITY   CLINICAL PRESENTATION:   Presentation: Evolving clinical presentation with changing clinical characteristics: MODERATE COMPLEXITY   CLINICAL DECISION MAKIN [facility] Form  How much difficulty does the patient currently have. .. Unable A Lot A Little None   1. Turning over in bed (including adjusting bedclothes, sheets and blankets)? [] 1   [] 2   [x] 3   [] 4   2. Sitting down on and standing up from a chair with arms ( e.g., wheelchair, bedside commode, etc.)   [] 1   [] 2   [x] 3   [] 4   3. Moving from lying on back to sitting on the side of the bed? [] 1   [x] 2   [] 3   [] 4   How much help from another person does the patient currently need. .. Total A Lot A Little None   4. Moving to and from a bed to a chair (including a wheelchair)? [] 1   [] 2   [x] 3   [] 4   5. Need to walk in hospital room? [] 1   [] 2   [x] 3   [] 4   6. Climbing 3-5 steps with a railing? [] 1   [x] 2   [] 3   [] 4   © 2007, Trustees of [facility] [employer], under license to [employer]. All rights reserved      Score:  Initial: 16 Most Recent: X (Date: -- )    Interpretation of Tool:  Represents activities that are increasingly more difficult (i.e. Bed mobility, Transfers, Gait). Score 24 23 22-20 19-15 14-10 9-7 6     Modifier CH CI CJ CK CL CM CN      ? Mobility - Walking and Moving Around:     - CURRENT STATUS: CK - 40%-59% impaired, limited or restricted    - GOAL STATUS: CK - 40%-59% impaired, limited or restricted    - D/C STATUS:  ---------------To be determined---------------  [place] / Product Type: Medicare /      Medical Necessity:     · Patient is expected to demonstrate progress in functional technique to increase independence with mobility and gait. Reason for Services/Other Comments:  · Patient continues to require present interventions due to patient's inability to function at baseline. .   Use of outcome tool(s) and clinical judgement create a POC that gives a: Questionable prediction of patient's progress: MODERATE COMPLEXITY            TREATMENT:   (In addition to Assessment/Re-Assessment sessions the following treatments were rendered)   Pre-treatment Symptoms/Complaints:  none  Pain: Initial:     5 Post Session:  No change     Therapeutic Activity: (    11 min): Therapeutic activities including chair transfers, standing balance, gt on level surface to improve mobility, strength and balance. Required min   to promote dynamic balance in standing. Braces/Orthotics/Lines/Etc:   · IV  Treatment/Session Assessment:    · Response to Treatment:  good  · Interdisciplinary Collaboration:   o Registered Nurse  · After treatment position/precautions:   o Up in chair  o Call light within reach  o RN notified   · Compliance with Program/Exercises: Will assess as treatment progresses. · Recommendations/Intent for next treatment session: \"Next visit will focus on advancements to more challenging activities and reduction in assistance provided\".   Total Treatment Duration:PT Patient Time In/Time Out  Time In: 2159  Time Out: [id number], PTA

## 2017-11-06 NOTE — PROGRESS NOTES
Progress Note    Patient: Anderson Phalen MRN: [id number]  SSN: [id number]    YOB: 1945  Age: 80 y.o. Sex: male      Admit Date: 11/5/2017    LOS: 1 day     Subjective:   Josefina Mantilla is a 81 yo M directly admitted 11/5 from [facility] for L hip fracture after falling on Friday. S/P left hip hemiarthroplasty 11/5/17. PT seen with wife today at bedside and had no new complaints.        Objective:     Vitals:    11/05/17 1500 11/05/17 1952 11/06/17 0016 11/06/17 0416   BP: 143/77 127/75 109/61 149/69   Pulse: 69 79 70 82   Resp: 21 17 17 17   Temp:  98.7 °F (37.1 °C) 98.8 °F (37.1 °C) 98.6 °F (37 °C)   SpO2: 96% 94% 93% 92%        Intake and Output:  Current Shift:    Last three shifts: 11/04 1901 - 11/06 0700  In: 1105 [P.O.:30; I.V.:1075]  Out: 825 [Urine:575]    Physical Exam:   GENERAL: alert, cooperative, no distress, appears stated age  HEART: regular rate and rhythm, S1, S2 normal, no murmur, click, rub or gallop  ABDOMEN: soft, non-tender. Bowel sounds normal. No masses,  no organomegaly  EXTREMITIES:  extremities normal, atraumatic, no cyanosis or edema  SKIN: no rash or abnormalities  NEUROLOGIC: AOx3. Gait normal. Reflexes and motor strength normal and symmetric. Cranial nerves 2-12 and sensation grossly intact.   PSYCHIATRIC: non focal    Lab/Data Review:  BMP: No results found for: NA, K, CL, CO2, AGAP, GLU, BUN, CREA, GFRAA, GFRNA  CBC:   Lab Results   Component Value Date/Time    WBC 9.2 11/06/2017 06:59 AM    HGB 9.1 (L) 11/06/2017 06:59 AM    HCT 27.8 (L) 11/06/2017 06:59 AM     11/06/2017 06:59 AM       Assessment:     Principal Problem:    Closed left hip fracture ([address] (11/5/2017)    Active Problems:    BPH (benign prostatic hyperplasia) (7/23/2015)      Paroxysmal atrial fibrillation ([facility] (4/13/2017)      High blood pressure (10/26/2017)      Chronic kidney disease (11/5/2017)      Closed right hip fracture ([facility] (11/5/2017)        Plan:   S/P Left hip hemiarthroplasty POD #1  - cont routine post op care    - appreciated PM&R note; STR planned     HTN  - cont BP meds    CAD prophylaxis   - restart ASA 81mg po daily       DVT Prophylaxis:   - Lovenox post-op    FEN  - Reg    Dispo  - to STR once arrangements finalized     Signed By: Tyler Mane MD     November 6, 2017

## 2017-11-06 NOTE — PROGRESS NOTES
Problem: Falls - Risk of  Goal: *Absence of Falls  Document Cedric Fall Risk and appropriate interventions in the flowsheet.    Outcome: Progressing Towards Goal  Fall Risk Interventions:  Mobility Interventions: Bed/chair exit alarm, OT consult for ADLs, PT Consult for mobility concerns, Patient to call before getting OOB, Utilize walker, cane, or other assitive device         Medication Interventions: Bed/chair exit alarm, Evaluate medications/consider consulting pharmacy    Elimination Interventions: Bed/chair exit alarm, Call light in reach, Patient to call for help with toileting needs    History of Falls Interventions: Bed/chair exit alarm, Consult care management for discharge planning

## 2017-11-06 NOTE — PROGRESS NOTES
SW spoke with pt and wife at bedside. Prior to admission, pt didn't use anything to ambulate and was ADL independent. He lives in a one-story home that has a one step porch. Family transports him due to a degenerative history affecting his eyesight. Pt wants rehab at [facility]. Referral sent through [id number] [facility]. CM to continue following for all d/c needs. Care Management Interventions  PCP Verified by CM: Yes  Mode of Transport at Discharge: BLS  Transition of Care Consult (CM Consult): SNF  Discharge Durable Medical Equipment: No  Physical Therapy Consult: Yes  Occupational Therapy Consult: Yes  Current Support Network: Lives with Spouse, Own Home  Confirm Follow Up Transport: Family  Plan discussed with Pt/Family/Caregiver: Yes  Freedom of Choice Offered:  Yes

## 2017-11-07 LAB
25(OH)D3+25(OH)D2 SERPL-MCNC: 40.1 NG/ML (ref 30–100)
ANION GAP SERPL CALC-SCNC: 9 MMOL/L (ref 7–16)
BASOPHILS # BLD: 0 K/UL (ref 0–0.2)
BASOPHILS NFR BLD: 0 % (ref 0–2)
BUN SERPL-MCNC: 38 MG/DL (ref 8–23)
CALCIUM SERPL-MCNC: 9.7 MG/DL (ref 8.3–10.4)
CHLORIDE SERPL-SCNC: 107 MMOL/L (ref 98–107)
CO2 SERPL-SCNC: 23 MMOL/L (ref 21–32)
CREAT SERPL-MCNC: 2.91 MG/DL (ref 0.8–1.5)
DIFFERENTIAL METHOD BLD: ABNORMAL
EOSINOPHIL # BLD: 0.2 K/UL (ref 0–0.8)
EOSINOPHIL NFR BLD: 2 % (ref 0.5–7.8)
ERYTHROCYTE [DISTWIDTH] IN BLOOD BY AUTOMATED COUNT: 14.2 % (ref 11.9–14.6)
GLUCOSE SERPL-MCNC: 98 MG/DL (ref 65–100)
HCT VFR BLD AUTO: 28.2 % (ref 41.1–50.3)
HGB BLD-MCNC: 9.3 G/DL (ref 13.6–17.2)
IMM GRANULOCYTES # BLD: 0 K/UL (ref 0–0.5)
IMM GRANULOCYTES NFR BLD: 0 % (ref 0–5)
LYMPHOCYTES # BLD: 1.4 K/UL (ref 0.5–4.6)
LYMPHOCYTES NFR BLD: 13 % (ref 13–44)
MAGNESIUM SERPL-MCNC: 2.2 MG/DL (ref 1.8–2.4)
MCH RBC QN AUTO: 32.1 PG (ref 26.1–32.9)
MCHC RBC AUTO-ENTMCNC: 33 G/DL (ref 31.4–35)
MCV RBC AUTO: 97.2 FL (ref 79.6–97.8)
MM INDURATION POC: 0 MM (ref 0–5)
MONOCYTES # BLD: 1.1 K/UL (ref 0.1–1.3)
MONOCYTES NFR BLD: 10 % (ref 4–12)
NEUTS SEG # BLD: 7.9 K/UL (ref 1.7–8.2)
NEUTS SEG NFR BLD: 75 % (ref 43–78)
PHOSPHATE SERPL-MCNC: 3 MG/DL (ref 2.3–3.7)
PLATELET # BLD AUTO: 212 K/UL (ref 150–450)
PMV BLD AUTO: 9.4 FL (ref 10.8–14.1)
POTASSIUM SERPL-SCNC: 4.2 MMOL/L (ref 3.5–5.1)
PPD POC: NEGATIVE NEGATIVE
RBC # BLD AUTO: 2.9 M/UL (ref 4.23–5.67)
SODIUM SERPL-SCNC: 139 MMOL/L (ref 136–145)
WBC # BLD AUTO: 10.7 K/UL (ref 4.3–11.1)

## 2017-11-07 PROCEDURE — 74011250637 HC RX REV CODE- 250/637: Performed by: ORTHOPAEDIC SURGERY

## 2017-11-07 PROCEDURE — 97150 GROUP THERAPEUTIC PROCEDURES: CPT

## 2017-11-07 PROCEDURE — 84100 ASSAY OF PHOSPHORUS: CPT | Performed by: ORTHOPAEDIC SURGERY

## 2017-11-07 PROCEDURE — 74011250636 HC RX REV CODE- 250/636: Performed by: ORTHOPAEDIC SURGERY

## 2017-11-07 PROCEDURE — 80048 BASIC METABOLIC PNL TOTAL CA: CPT | Performed by: ORTHOPAEDIC SURGERY

## 2017-11-07 PROCEDURE — 97530 THERAPEUTIC ACTIVITIES: CPT

## 2017-11-07 PROCEDURE — 74011250637 HC RX REV CODE- 250/637: Performed by: INTERNAL MEDICINE

## 2017-11-07 PROCEDURE — 36415 COLL VENOUS BLD VENIPUNCTURE: CPT | Performed by: ORTHOPAEDIC SURGERY

## 2017-11-07 PROCEDURE — 74011636637 HC RX REV CODE- 636/637: Performed by: HOSPITALIST

## 2017-11-07 PROCEDURE — 74011250637 HC RX REV CODE- 250/637: Performed by: HOSPITALIST

## 2017-11-07 PROCEDURE — 85025 COMPLETE CBC W/AUTO DIFF WBC: CPT | Performed by: ORTHOPAEDIC SURGERY

## 2017-11-07 PROCEDURE — 83735 ASSAY OF MAGNESIUM: CPT | Performed by: ORTHOPAEDIC SURGERY

## 2017-11-07 PROCEDURE — 65270000029 HC RM PRIVATE

## 2017-11-07 RX ADMIN — OYSTER SHELL CALCIUM WITH VITAMIN D 1 TABLET: 500; 200 TABLET, FILM COATED ORAL at 17:32

## 2017-11-07 RX ADMIN — MESALAMINE 800 MG: 800 TABLET, DELAYED RELEASE ORAL at 17:32

## 2017-11-07 RX ADMIN — Medication 10 ML: at 05:53

## 2017-11-07 RX ADMIN — DILTIAZEM HYDROCHLORIDE 180 MG: 180 CAPSULE, COATED, EXTENDED RELEASE ORAL at 17:32

## 2017-11-07 RX ADMIN — ASPIRIN 81 MG: 81 TABLET, COATED ORAL at 08:44

## 2017-11-07 RX ADMIN — FLECAINIDE ACETATE 50 MG: 100 TABLET ORAL at 08:44

## 2017-11-07 RX ADMIN — FLECAINIDE ACETATE 50 MG: 100 TABLET ORAL at 20:40

## 2017-11-07 RX ADMIN — OXYCODONE HYDROCHLORIDE 5 MG: 5 TABLET ORAL at 05:53

## 2017-11-07 RX ADMIN — ATORVASTATIN CALCIUM 40 MG: 40 TABLET, FILM COATED ORAL at 08:44

## 2017-11-07 RX ADMIN — ACETAMINOPHEN 650 MG: 325 TABLET ORAL at 14:16

## 2017-11-07 RX ADMIN — ACETAMINOPHEN 650 MG: 325 TABLET ORAL at 20:39

## 2017-11-07 RX ADMIN — MESALAMINE 800 MG: 800 TABLET, DELAYED RELEASE ORAL at 08:44

## 2017-11-07 RX ADMIN — OYSTER SHELL CALCIUM WITH VITAMIN D 1 TABLET: 500; 200 TABLET, FILM COATED ORAL at 11:35

## 2017-11-07 RX ADMIN — OYSTER SHELL CALCIUM WITH VITAMIN D 1 TABLET: 500; 200 TABLET, FILM COATED ORAL at 08:44

## 2017-11-07 RX ADMIN — ALUMINUM HYDROXIDE, MAGNESIUM HYDROXIDE, AND SIMETHICONE 30 ML: 200; 200; 20 SUSPENSION ORAL at 20:44

## 2017-11-07 RX ADMIN — OXYCODONE HYDROCHLORIDE 5 MG: 5 TABLET ORAL at 10:00

## 2017-11-07 RX ADMIN — ACETAMINOPHEN 650 MG: 325 TABLET ORAL at 05:53

## 2017-11-07 RX ADMIN — HYDROCHLOROTHIAZIDE: 25 TABLET ORAL at 20:39

## 2017-11-07 RX ADMIN — ENOXAPARIN SODIUM 30 MG: 30 INJECTION SUBCUTANEOUS at 11:35

## 2017-11-07 RX ADMIN — OXYCODONE HYDROCHLORIDE 5 MG: 5 TABLET ORAL at 20:41

## 2017-11-07 RX ADMIN — DULOXETINE HYDROCHLORIDE 30 MG: 30 CAPSULE, DELAYED RELEASE ORAL at 17:32

## 2017-11-07 RX ADMIN — PREDNISONE 5 MG: 5 TABLET ORAL at 08:44

## 2017-11-07 NOTE — PROGRESS NOTES
Problem: Mobility Impaired (Adult and Pediatric)  Goal: *Acute Goals and Plan of Care (Insert Text)  1. Mr. Shaji Sorensen will perform supine to sit and sit to supine independently in 5 days. 2.  Mr. Shaji Sorensen will perform sit to stand and bed to chair independently with least restrictive device in 5 days. 3.  Mr. Shaji Sorensen will perform gait with least restrictive device 200 ft independently in 5 days. 4.  Mr. Shaji Sorensen will maintain hip precautions in 5 days. 5.  Mr. Shaji Sorensen will go up and down 1 step independently with rolling walker in 5 days. 6.  Mr. Shaji Sorensen will perform therex to left lower extremity x 10 reps active range of motion in 5 days. PHYSICAL THERAPY: Daily Note, Treatment Day: 2nd, AM 11/7/2017  INPATIENT: Hospital Day: 3  [place] / Product Type: Medicare /      NAME/AGE/GENDER: Caitie Dietrich is a 80 y.o. male   PRIMARY DIAGNOSIS: left hip fracture  Closed right hip fracture (HCC)  Left femoral neck fracture Closed left hip fracture (HCC) Closed left hip fracture (HCC)  Procedure(s) (LRB):  HIP HEMIARTHROPLASTY LEFT (Left)  2 Days Post-Op  ICD-10: Treatment Diagnosis:   · Generalized Muscle Weakness (M62.81)  · Difficulty in walking, Not elsewhere classified (R26.2)  · History of falling (Z91.81)   Precaution/Allergies:  Ambien [zolpidem] and Codeine      ASSESSMENT:     Mr. Shaji Sorensen presents supine in bed and reports doing well. Pt performed supine to sit with CGA, stood and was able to increase his ambulation to 110' with rolling walker and CGA. Pt with slight decreased balance at times during ambulation, but continues to make progress. Pt returned to room and later ambulated 40' down to therapy gym where he was able to participate in group exercises as below. Pt ambulated back to his room, KI donned and pt was left up in chair with wife attending. Pt is making good progress. Will continue with POC.       This section established at most recent assessment   PROBLEM LIST (Impairments causing functional limitations):  1. Decreased Strength  2. Decreased Transfer Abilities  3. Decreased Ambulation Ability/Technique  4. Decreased Activity Tolerance   INTERVENTIONS PLANNED: (Benefits and precautions of physical therapy have been discussed with the patient.)  1. Bed Mobility  2. Gait Training  3. Therapeutic Activites  4. Transfer Training  5. Group Therapy     TREATMENT PLAN: Frequency/Duration: twice daily for duration of hospital stay  Rehabilitation Potential For Stated Goals: Good     RECOMMENDED REHABILITATION/EQUIPMENT: (at time of discharge pending progress): Due to the probability of continued deficits (see above) this patient will likely need continued skilled physical therapy after discharge. Equipment:   [employer], Type: Rolling Walker              HISTORY:   History of Present Injury/Illness (Reason for Referral):  79 y/o gentleman with prior hx CAD, PAF (last in 2016, on asa), HTN, renal cell carcinoma, s/p left nephrectomy March 2017, prostate cancer, s/p RALP 1/2017 presents to the ED tonight with persistent left hip pain and inability to walk or weight bear. Friday morning he was trying to get over a dog gate when he slipped and fell onto his left hip and shoulder. Went to [facility] and xrays were negative for fracture. He did not sustain any head injury or LOC. Tried to weight-bear or ambulate tonight with a walker but could not so came to [facility] ED. xrays tonight reveal subcapital left femoral neck fracture. He will be transferred to [facility] for definitive surgical repair. He denies any recent cardiac complaints, no chest pain, palpitations, shortness of breath, fever or chills. No bleeding tendencies. He is hemodynamically stable for transfer tonight via EMS. Past Medical History/Comorbidities:   Mr. Adiel Tejeda  has a past medical history of Anxiety; Arthritis; Atrial fibrillation ([address] BPH (benign prostatic hypertrophy); CAD (coronary artery disease);  Cancer ([facility] [address] Chronic kidney disease; Chronic pain; Elevated PSA; Erectile dysfunction; GERD (gastroesophageal reflux disease); High blood pressure (10/26/2017); High cholesterol (10/26/2017); Hypercholesteremia; Hypertension; Macular degeneration; Personal history of prostate cancer; Psychiatric disorder; Spondylitis ([facility] [address] Ulcerative colitis ([facility] [address] Ulcerative colitis ([facility] [address] Vitamin D deficiency; and Wears dentures. Mr. Marshall Mcmanus  has a past surgical history that includes colonoscopy; appendectomy (age 30's); orthopaedic (Right); hernia repair (Bilateral, 2013); lap cholecystectomy (2013); hernia repair; cataract removal (Bilateral); retinal detachment repair; and nephrectomy (Left, 2017).   Social History/Living Environment:   Home Environment: Private residence  # Steps to Enter: 1  One/Two Story Residence: One story  Living Alone: No  Support Systems: Spouse/Significant Other/Partner  Patient Expects to be Discharged to[de-identified] Unknown  Current DME Used/Available at Home: None  Tub or Shower Type: Shower  Prior Level of Function/Work/Activity:  independent  age   Number of Personal Factors/Comorbidities that affect the Plan of Care: 1-2: MODERATE COMPLEXITY   EXAMINATION:   Most Recent Physical Functioning:   Gross Assessment:                  Posture:     Balance:  Sitting - Static: Good (unsupported)  Sitting - Dynamic: Fair (occasional) (+)  Standing - Static: Fair (+)  Standing - Dynamic : Fair (+ with rolling walker) Bed Mobility:  Supine to Sit: Contact guard assistance  Wheelchair Mobility:     Transfers:  Sit to Stand: Stand-by asssistance  Stand to Sit: Stand-by asssistance  Gait:  Left Side Weight Bearing: As tolerated  Step Length: Left shortened;Right shortened  Gait Abnormalities: Decreased step clearance  Distance (ft): 110 Feet (ft) (40' x 2)  Assistive Device: Walker, rolling  Ambulation - Level of Assistance: Contact guard assistance  Interventions: Safety awareness training;Verbal cues      Body Structures Involved:  1. Muscles Body Functions Affected:  1. Movement Related Activities and Participation Affected:  1. Mobility   Number of elements that affect the Plan of Care: 3: MODERATE COMPLEXITY   CLINICAL PRESENTATION:   Presentation: Evolving clinical presentation with changing clinical characteristics: MODERATE COMPLEXITY   CLINICAL DECISION MAKIN [facility] Mobility Inpatient Short Form  How much difficulty does the patient currently have. .. Unable A Lot A Little None   1. Turning over in bed (including adjusting bedclothes, sheets and blankets)? [] 1   [] 2   [x] 3   [] 4   2. Sitting down on and standing up from a chair with arms ( e.g., wheelchair, bedside commode, etc.)   [] 1   [] 2   [x] 3   [] 4   3. Moving from lying on back to sitting on the side of the bed? [] 1   [x] 2   [] 3   [] 4   How much help from another person does the patient currently need. .. Total A Lot A Little None   4. Moving to and from a bed to a chair (including a wheelchair)? [] 1   [] 2   [x] 3   [] 4   5. Need to walk in hospital room? [] 1   [] 2   [x] 3   [] 4   6. Climbing 3-5 steps with a railing? [] 1   [x] 2   [] 3   [] 4   © , Trustees of [address] [place] [address], under license to [employer]. All rights reserved      Score:  Initial: 16 Most Recent: X (Date: -- )    Interpretation of Tool:  Represents activities that are increasingly more difficult (i.e. Bed mobility, Transfers, Gait). Score 24 23 22-20 19-15 14-10 9-7 6     Modifier CH CI CJ CK CL CM CN      ?  Mobility - Walking and Moving Around:     - CURRENT STATUS: CK - 40%-59% impaired, limited or restricted    - GOAL STATUS: CK - 40%-59% impaired, limited or restricted    - D/C STATUS:  ---------------To be determined---------------  [place] / Product Type: Medicare /      Medical Necessity:     · Patient is expected to demonstrate progress in functional technique to increase independence with mobility and gait. Reason for Services/Other Comments:  · Patient continues to require present interventions due to patient's inability to function at baseline. .   Use of outcome tool(s) and clinical judgement create a POC that gives a: Questionable prediction of patient's progress: MODERATE COMPLEXITY            TREATMENT:      Pre-treatment Symptoms/Complaints:  none  Pain: Initial:     5 Post Session:  No change     Therapeutic Activity: (    13 minutes): Therapeutic activities including chair transfers, standing balance, gt on level surface to improve mobility, strength and balance. Required min Safety awareness training;Verbal cues to promote dynamic balance in standing. Group Therapeutic Exercise: (  10 minutes):  Exercises per grid below to improve mobility and strength. Required minimal visual and verbal cues to promote proper body alignment and promote proper body posture. Progressed range and repetitions as indicated. Date:  11/7/17 Date:   Date:     ACTIVITY/EXERCISE AM PM AM PM AM PM   Ambulation:           Distance  Device  Duration Group exs        Seated Heel Raises 2 x 20 B        Seated Toe Raises 2 x 20 B        Seated Long Arc Quads 2 x 20 B        Seated Marching 2 x 20 B, maintaining HYACINTH precautions        Seated Hip Abduction 2 x 20 B                 B = bilateral; AA = active assistive; A = active; P = passive            Braces/Orthotics/Lines/Etc:   · IV  Treatment/Session Assessment:    · Response to Treatment:  good  · Interdisciplinary Collaboration:   o Physical Therapy Assistant  o Registered Nurse  · After treatment position/precautions:   o Up in chair  o Bed/Chair-wheels locked  o Call light within reach  o RN notified  o Family at bedside   · Compliance with Program/Exercises: Will assess as treatment progresses. · Recommendations/Intent for next treatment session:   \"Next visit will focus on advancements to more challenging

## 2017-11-07 NOTE — PROGRESS NOTES
Problem: Mobility Impaired (Adult and Pediatric)  Goal: *Acute Goals and Plan of Care (Insert Text)  1. Mr. Penny Barraza will perform supine to sit and sit to supine independently in 5 days. 2.  Mr. Penny Barraza will perform sit to stand and bed to chair independently with least restrictive device in 5 days. 3.  Mr. Penny Barraza will perform gait with least restrictive device 200 ft independently in 5 days. 4.  Mr. Penny Barraza will maintain hip precautions in 5 days. 5.  Mr. Penny Barraza will go up and down 1 step independently with rolling walker in 5 days. 6.  Mr. Penny Barraza will perform therex to left lower extremity x 10 reps active range of motion in 5 days. PHYSICAL THERAPY: Daily Note, Treatment Day: 2nd, PM 11/7/2017  INPATIENT: Hospital Day: 3  [place] / Product Type: Medicare /      NAME/AGE/GENDER: Leeann Carbajal is a 80 y.o. male   PRIMARY DIAGNOSIS: left hip fracture  Closed right hip fracture (HCC)  Left femoral neck fracture Closed left hip fracture (HCC) Closed left hip fracture (HCC)  Procedure(s) (LRB):  HIP HEMIARTHROPLASTY LEFT (Left)  2 Days Post-Op  ICD-10: Treatment Diagnosis:   · Generalized Muscle Weakness (M62.81)  · Difficulty in walking, Not elsewhere classified (R26.2)  · History of falling (Z91.81)   Precaution/Allergies:  Ambien [zolpidem] and Codeine      ASSESSMENT:     Mr. Penny Barraza presents sitting up in chair on contact, ready to ambulate. Pt stood and increased her ambulation to 250' with rolling walker and SBA/CGA. Pt is doing better with ambulation and step length. Pt returned to side of bed and supine with SBA/CGA. KI donned after treatment. Reviewed hip precautions with pt and wife. Pt was left supine, needs in reach, and wife present. Would recommend HHPT if pt goes home vs. Rehab. Pt is making good progress. Will continue with POC. This section established at most recent assessment   PROBLEM LIST (Impairments causing functional limitations):  1.  Decreased Strength  2. Decreased Transfer Abilities  3. Decreased Ambulation Ability/Technique  4. Decreased Activity Tolerance   INTERVENTIONS PLANNED: (Benefits and precautions of physical therapy have been discussed with the patient.)  1. Bed Mobility  2. Gait Training  3. Therapeutic Activites  4. Transfer Training  5. Group Therapy     TREATMENT PLAN: Frequency/Duration: twice daily for duration of hospital stay  Rehabilitation Potential For Stated Goals: Good     RECOMMENDED REHABILITATION/EQUIPMENT: (at time of discharge pending progress): Due to the probability of continued deficits (see above) this patient will likely need continued skilled physical therapy after discharge. Equipment:   [place], Type: Rolling Walker              HISTORY:   History of Present Injury/Illness (Reason for Referral):  79 y/o gentleman with prior hx CAD, PAF (last in 2016, on asa), HTN, renal cell carcinoma, s/p left nephrectomy March 2017, prostate cancer, s/p RALP 1/2017 presents to the ED tonight with persistent left hip pain and inability to walk or weight bear. Friday morning he was trying to get over a dog gate when he slipped and fell onto his left hip and shoulder. Went to [facility] and xrays were negative for fracture. He did not sustain any head injury or LOC. Tried to weight-bear or ambulate tonight with a walker but could not so came to [facility] ED. xrays tonight reveal subcapital left femoral neck fracture. He will be transferred to [facility] for definitive surgical repair. He denies any recent cardiac complaints, no chest pain, palpitations, shortness of breath, fever or chills. No bleeding tendencies. He is hemodynamically stable for transfer tonight via EMS. Past Medical History/Comorbidities:   Mr. Alexey Lyman  has a past medical history of Anxiety; Arthritis; Atrial fibrillation ([address] BPH (benign prostatic hypertrophy); CAD (coronary artery disease); Cancer ([address]  Chronic kidney disease; Chronic pain; Elevated PSA; Erectile dysfunction; GERD (gastroesophageal reflux disease); High blood pressure (10/26/2017); High cholesterol (10/26/2017); Hypercholesteremia; Hypertension; Macular degeneration; Personal history of prostate cancer; Psychiatric disorder; Spondylitis ([address] Ulcerative colitis ([address] Ulcerative colitis ([address] Vitamin D deficiency; and Wears dentures. Mr. Codey Cueto  has a past surgical history that includes colonoscopy; appendectomy (age 30's); orthopaedic (Right); hernia repair (Bilateral, 2013); lap cholecystectomy (2013); hernia repair; cataract removal (Bilateral); retinal detachment repair; and nephrectomy (Left, 2017).   Social History/Living Environment:   Home Environment: Private residence  # Steps to Enter: 1  One/Two Story Residence: One story  Living Alone: No  Support Systems: Spouse/Significant Other/Partner  Patient Expects to be Discharged to[de-identified] Unknown  Current DME Used/Available at Home: None  Tub or Shower Type: Shower  Prior Level of Function/Work/Activity:  independent  age   Number of Personal Factors/Comorbidities that affect the Plan of Care: 1-2: MODERATE COMPLEXITY   EXAMINATION:   Most Recent Physical Functioning:   Gross Assessment:                  Posture:     Balance:  Sitting - Static: Good (unsupported)  Sitting - Dynamic: Fair (occasional) (+)  Standing - Static: Fair (+)  Standing - Dynamic : Fair (+ with rolling walker) Bed Mobility:  Supine to Sit: Contact guard assistance  Sit to Supine: Stand-by asssistance;Contact guard assistance  Wheelchair Mobility:     Transfers:  Sit to Stand: Stand-by asssistance  Stand to Sit: Stand-by asssistance  Gait:  Left Side Weight Bearing: As tolerated  Step Length: Left shortened;Right shortened  Gait Abnormalities: Decreased step clearance  Distance (ft): 250 Feet (ft)  Assistive Device: Walker, rolling  Ambulation - Level of Assistance: Stand-by asssistance;Contact guard assistance  Interventions: Safety awareness training;Verbal cues      Body Structures Involved:  1. Muscles Body Functions Affected:  1. Movement Related Activities and Participation Affected:  1. Mobility   Number of elements that affect the Plan of Care: 3: MODERATE COMPLEXITY   CLINICAL PRESENTATION:   Presentation: Evolving clinical presentation with changing clinical characteristics: MODERATE COMPLEXITY   CLINICAL DECISION MAKIN [facility] Mobility Inpatient Short Form  How much difficulty does the patient currently have. .. Unable A Lot A Little None   1. Turning over in bed (including adjusting bedclothes, sheets and blankets)? [] 1   [] 2   [x] 3   [] 4   2. Sitting down on and standing up from a chair with arms ( e.g., wheelchair, bedside commode, etc.)   [] 1   [] 2   [x] 3   [] 4   3. Moving from lying on back to sitting on the side of the bed? [] 1   [x] 2   [] 3   [] 4   How much help from another person does the patient currently need. .. Total A Lot A Little None   4. Moving to and from a bed to a chair (including a wheelchair)? [] 1   [] 2   [x] 3   [] 4   5. Need to walk in hospital room? [] 1   [] 2   [x] 3   [] 4   6. Climbing 3-5 steps with a railing? [] 1   [x] 2   [] 3   [] 4   © , Trustees of [address] [place] [address], under license to [employer]. All rights reserved      Score:  Initial: 16 Most Recent: X (Date: -- )    Interpretation of Tool:  Represents activities that are increasingly more difficult (i.e. Bed mobility, Transfers, Gait). Score 24 23 22-20 19-15 14-10 9-7 6     Modifier CH CI CJ CK CL CM CN      ?  Mobility - Walking and Moving Around:     - CURRENT STATUS: CK - 40%-59% impaired, limited or restricted    - GOAL STATUS: CK - 40%-59% impaired, limited or restricted    - D/C STATUS:  ---------------To be determined---------------  [place] / Product Type: Medicare /      Medical Necessity:     · Patient is expected to demonstrate progress in functional technique to increase independence with mobility and gait. Reason for Services/Other Comments:  · Patient continues to require present interventions due to patient's inability to function at baseline. .   Use of outcome tool(s) and clinical judgement create a POC that gives a: Questionable prediction of patient's progress: MODERATE COMPLEXITY            TREATMENT:      Pre-treatment Symptoms/Complaints:  none  Pain: Initial:     5 Post Session:  No change     Therapeutic Activity: (    10 minutes): Therapeutic activities including chair transfers, bed transfers, standing balance, gt on level surface to improve mobility, strength and balance. Required min Safety awareness training;Verbal cues to promote dynamic balance in standing. Group Therapeutic Exercise: (  0 minutes):  Exercises per grid below to improve mobility and strength. Required minimal visual and verbal cues to promote proper body alignment and promote proper body posture. Progressed range and repetitions as indicated. Date:  11/7/17 Date:   Date:     ACTIVITY/EXERCISE AM PM AM PM AM PM   Ambulation:           Distance  Device  Duration Group exs        Seated Heel Raises 2 x 20 B        Seated Toe Raises 2 x 20 B        Seated Long Arc Quads 2 x 20 B        Seated Marching 2 x 20 B, maintaining HYACINTH precautions        Seated Hip Abduction 2 x 20 B                 B = bilateral; AA = active assistive; A = active; P = passive            Braces/Orthotics/Lines/Etc:   · IV  Treatment/Session Assessment:    · Response to Treatment:  good  · Interdisciplinary Collaboration:   o Physical Therapy Assistant  o Registered Nurse  · After treatment position/precautions:   o Supine in bed  o Bed/Chair-wheels locked  o Call light within reach  o RN notified  o Family at bedside   · Compliance with Program/Exercises: Will assess as treatment progresses. · Recommendations/Intent for next treatment session:   \"Next visit will focus on advancements to more challenging activities and reduction in assistance provided\".   Total Treatment Duration:PT Patient Time In/Time Out  Time In: 1400  Time Out: [address], PTA

## 2017-11-07 NOTE — PROGRESS NOTES
Problem: Falls - Risk of  Goal: *Absence of Falls  Document Cedric Fall Risk and appropriate interventions in the flowsheet.    Outcome: Progressing Towards Goal  Fall Risk Interventions:  Mobility Interventions: Bed/chair exit alarm, Communicate number of staff needed for ambulation/transfer, PT Consult for mobility concerns, OT consult for ADLs, PT Consult for assist device competence         Medication Interventions: Bed/chair exit alarm, Evaluate medications/consider consulting pharmacy, Patient to call before getting OOB    Elimination Interventions: Call light in reach, Toileting schedule/hourly rounds    History of Falls Interventions: Bed/chair exit alarm, Consult care management for discharge planning

## 2017-11-07 NOTE — PROGRESS NOTES
ORTH FRACTURE PROGRESS NOTE    2017  Admit Date:   2017    Post Op day: 1 Days Post-Op    Subjective:    Dorene Sood EATING DINNER     PT/OT:   Gait:  Gait  Step Length: Left shortened, Right shortened  Ambulation - Level of Assistance: Contact guard assistance  Distance (ft): 45 Feet (ft)  Assistive Device: Walker, rolling                 Vital Signs:    Patient Vitals for the past 8 hrs:   BP Temp Pulse Resp SpO2   17 0428 176/86 97.9 °F (36.6 °C) 74 18 96 %   17 2337 169/84 98.8 °F (37.1 °C) 78 18 94 %     Temp (24hrs), Av.7 °F (37.1 °C), Min:97.9 °F (36.6 °C), Max:99.4 °F (37.4 °C)      Pain Control:   Pain Assessment  Pain Scale 1: Numeric (0 - 10)  Pain Intensity 1: 4  Pain Onset 1: post op  Pain Location 1: Hip  Pain Orientation 1: Left  Pain Description 1: Aching  Pain Intervention(s) 1: Repositioned    Meds:    Current Facility-Administered Medications   Medication Dose Route Frequency    aspirin delayed-release tablet 81 mg  81 mg Oral DAILY    atorvastatin (LIPITOR) tablet 40 mg  40 mg Oral DAILY    dilTIAZem CD (CARDIZEM CD) capsule 180 mg  180 mg Oral DAILY WITH DINNER    ergocalciferol (ERGOCALCIFEROL) capsule 50,000 Units  50,000 Units Oral every Monday    flecainide (TAMBOCOR) tablet 50 mg  50 mg Oral Q12H    DULoxetine (CYMBALTA) capsule 30 mg  30 mg Oral DAILY WITH DINNER    HYDROcodone-acetaminophen (NORCO) 7.5-325 mg per tablet 2 Tab  2 Tab Oral Q4H PRN    mesalamine DR (ASACOL HD) tablet 800 mg  800 mg Oral BID    predniSONE (DELTASONE) tablet 5 mg  5 mg Oral DAILY WITH BREAKFAST    hydrALAZINE (APRESOLINE) 20 mg/mL injection 10 mg  10 mg IntraVENous Q6H PRN    losartan/hydroCHLOROthiazide (HYZAAR) 100/25 mg   Oral QHS    0.9% sodium chloride infusion 2,000 mL  2,000 mL IntraVENous CONTINUOUS    HYDROmorphone (PF) (DILAUDID) injection 0.5 mg  0.5 mg IntraVENous Q4H PRN    traMADol (ULTRAM) tablet 50 mg  50 mg Oral Q6H PRN    lactated Ringers infusion  75 mL/hr IntraVENous CONTINUOUS    sodium chloride (NS) flush 5-10 mL  5-10 mL IntraVENous Q8H    sodium chloride (NS) flush 5-10 mL  5-10 mL IntraVENous PRN    ondansetron (ZOFRAN) injection 4 mg  4 mg IntraVENous Q4H PRN    alum-mag hydroxide-simeth (MYLANTA) oral suspension 30 mL  30 mL Oral Q4H PRN    calcium-vitamin D (OS-DMITRIY) 500 mg-200 unit tablet  1 Tab Oral TID WITH MEALS    acetaminophen (TYLENOL) tablet 650 mg  650 mg Oral Q8H    oxyCODONE IR (ROXICODONE) tablet 5 mg  5 mg Oral Q4H PRN    enoxaparin (LOVENOX) injection 30 mg  30 mg SubCUTAneous Q24H    celecoxib (CELEBREX) capsule 200 mg  200 mg Oral ONCE PRN       LAB:    Recent Labs      11/06/17   0659  11/05/17   0210   HCT  27.8*  32.1*   HGB  9.1*  10.9*   INR   --   1.0       24 Hour Assessment Issues:    Oriented    Discharge Planning: SNF    Transfuse PRBC's:      Assessment & Physician's Comment:  Dressing is clean, dry, and intact  Neurovascular checks within normal limits   POSTOP XRAY REVIEWED    Principal Problem:    Closed left hip fracture ([address] (11/5/2017)    Active Problems:    BPH (benign prostatic hyperplasia) (7/23/2015)      Paroxysmal atrial fibrillation (HCC) (4/13/2017)      High blood pressure (10/26/2017)      Chronic kidney disease (11/5/2017)      Closed right hip fracture ([address] (11/5/2017)        Plan:  [facility] FOR REHAB      Sunita Herman NP

## 2017-11-07 NOTE — PROGRESS NOTES
Talked with pt and agreeable to bed offer with [facility], wants to speak with MD and staff in am about possibly going home with [facility]. States will wait til am to decide.

## 2017-11-07 NOTE — PROGRESS NOTES
ESTELLE confirmed that bed is available at [facility]. He is going to room . ESTELLE notified Dr. Flora Moran who will complete SNF transfer forms and D/C.

## 2017-11-07 NOTE — PROGRESS NOTES
Problem: Self Care Deficits Care Plan (Adult)  Goal: *Acute Goals and Plan of Care (Insert Text)  1. Patient will verbalize and demonstrate understanding of hip precautions with 100% accuracy during ADL. 2. Patient will complete functional transfers with supervision and adaptive equipment as needed. 3. Patient will complete lower body bathing and dressing with setup and adaptive equipment as needed. 4. Patient will complete toileting with supervision. 5. Patient will tolerate at least 20 minutes of BUE therapeutic exercises to increase strength in BUE to aid in functional transfers. 6. Patient will tolerate at least 20 minutes of OT treatment with no rest breaks to increase activity tolerance for ADLs. Timeframe: 7 visits        OCCUPATIONAL THERAPY: Daily Note, Treatment Day: 2nd and AM    11/7/2017  INPATIENT: Hospital Day: 3  [place] / [place] / Product Type: Medicare /      NAME/AGE/GENDER: Sherline Ormond is a 80 y.o. male   PRIMARY DIAGNOSIS:  left hip fracture  Closed right hip fracture (HCC)  Left femoral neck fracture Closed left hip fracture (HCC) Closed left hip fracture (HCC)  Procedure(s) (LRB):  HIP HEMIARTHROPLASTY LEFT (Left)  2 Days Post-Op  ICD-10: Treatment Diagnosis:    · Pain in left hip (M25.552)  · Stiffness of Left Hip, Not elsewhere classified (M25.652)  · History of falling (Z91.81)   Precautions/Allergies:    WBAT LLE   Hip Precautions   Ambien [zolpidem] and Codeine      ASSESSMENT:     Mr. Jennifer Cohen is a 80year old male who is now s/p above procedure after fall and is WBAT in LLE. Patient lives with wife and is typically with all ADLs and IADLs (except driving). 11/7/2017 Pt completed functional mobility  down to the gym with a rolling walker to participate in group exercises to increase strength for mobility and ADL's. Pt required visual, verbal, and manual cues to complete exercises with proper form.  Pt ambulated back to his room post treatment with the rehab tech. Pt participated well. Continue OT POC. This section established at most recent assessment   PROBLEM LIST (Impairments causing functional limitations):  1. Decreased ADL/Functional Activities  2. Decreased Transfer Abilities  3. Decreased Ambulation Ability/Technique  4. Decreased Balance  5. Increased Pain  6. Decreased Flexibility/Joint Mobility  7. Decreased Knowledge of Precautions   INTERVENTIONS PLANNED: (Benefits and precautions of occupational therapy have been discussed with the patient.)  1. Activities of daily living training  2. Adaptive equipment training  3. Donning&doffing training  4. Group therapy  5. Therapeutic activity  6. Therapeutic exercise     TREATMENT PLAN: Frequency/Duration: Follow patient 6x/ week to address above goals. Rehabilitation Potential For Stated Goals: Good     RECOMMENDED REHABILITATION/EQUIPMENT: (at time of discharge pending progress): Due to the probability of continued deficits (see above) this patient will likely need continued skilled occupational therapy after discharge. Equipment:    Walkers, Type: Rolling Walker              OCCUPATIONAL PROFILE AND HISTORY:   History of Present Injury/Illness (Reason for Referral):  Per H&P, \"80 y/o gentleman with prior hx CAD, PAF (last in 2016, on asa), HTN, renal cell carcinoma, s/p left nephrectomy March 2017, prostate cancer, s/p RALP 1/2017 presents to the ED tonight with persistent left hip pain and inability to walk or weight bear. Friday morning he was trying to get over a dog gate when he slipped and fell onto his left hip and shoulder. Went to [facility] and xrays were negative for fracture. He did not sustain any head injury or LOC. Tried to weight-bear or ambulate tonight with a walker but could not so came to [facility] ED. xrays tonight reveal subcapital left femoral neck fracture. He will be transferred to [facility] for definitive surgical repair.  He denies any recent cardiac complaints, no chest pain, palpitations, shortness of breath, fever or chills. No bleeding tendencies. He is hemodynamically stable for transfer tonight via EMS. \"  Past Medical History/Comorbidities:   Mr. Penny Barraza  has a past medical history of Anxiety; Arthritis; Atrial fibrillation ([facility] BPH (benign prostatic hypertrophy); CAD (coronary artery disease); Cancer ([facility] [address] Chronic kidney disease; Chronic pain; Elevated PSA; Erectile dysfunction; GERD (gastroesophageal reflux disease); High blood pressure (10/26/2017); High cholesterol (10/26/2017); Hypercholesteremia; Hypertension; Macular degeneration; Personal history of prostate cancer; Psychiatric disorder; Spondylitis ([facility] [address] Ulcerative colitis ([facility] [address] Ulcerative colitis ([facility] [address] Vitamin D deficiency; and Wears dentures. Mr. Penny Barraza  has a past surgical history that includes colonoscopy; appendectomy (age 30's); orthopaedic (Right); hernia repair (Bilateral, 2013); lap cholecystectomy (2013); hernia repair; cataract removal (Bilateral); retinal detachment repair; and nephrectomy (Left, 2017). Social History/Living Environment:   Home Environment: Private residence  # Steps to Enter: 1  One/Two Story Residence: One story  Living Alone: No  Support Systems: Spouse/Significant Other/Partner  Patient Expects to be Discharged to[de-identified] Unknown  Current DME Used/Available at Home: None  Tub or Shower Type: Shower  Prior Level of Function/Work/Activity:  Patient lives with wife and is typically independent with ADLs, IADLs, and ambulation. He does not drive due to vision. Personal Factors:          Sex:  male        Age:  80 y.o.         Profession:  Retired    Number of Personal Factors/Comorbidities that affect the Plan of Care: Brief history (0):  LOW COMPLEXITY   ASSESSMENT OF OCCUPATIONAL PERFORMANCE[de-identified]   Activities of Daily Living:           Basic ADLs (From Assessment) Complex ADLs (From Assessment)   Basic ADL  Feeding: Setup  Oral Facial Hygiene/Grooming: Setup  Bathing: Moderate assistance  Upper Body Dressing: Setup  Lower Body Dressing: Moderate assistance  Toileting: Minimum assistance Instrumental ADL  Meal Preparation: Maximum assistance  Homemaking: Maximum assistance  Medication Management: Independent  Financial Management: Independent   Grooming/Bathing/Dressing Activities of Daily Living                             Bed/Mat Mobility  Supine to Sit: Contact guard assistance  Sit to Stand: Stand-by asssistance       Most Recent Physical Functioning:   Gross Assessment:                  Posture:  Posture (WDL): Exceptions to WDL  Posture Assessment: Forward head  Balance:  Sitting - Static: Good (unsupported)  Sitting - Dynamic: Fair (occasional) (+)  Standing - Static: Fair (+)  Standing - Dynamic : Fair (+ with rolling walker) Bed Mobility:  Supine to Sit: Contact guard assistance  Wheelchair Mobility:     Transfers:  Sit to Stand: Stand-by asssistance  Stand to Sit: Stand-by asssistance                Patient Vitals for the past 6 hrs:   BP BP Patient Position SpO2 Pulse   17 0819 144/73 At rest 94 % 74   17 1127 161/76 Sitting 96 % 74       Mental Status  Neurologic State: Alert  Orientation Level: Oriented X4  Cognition: Follows commands  Perception: Appears intact  Perseveration: No perseveration noted  Safety/Judgement: Awareness of environment, Fall prevention, Insight into deficits                          Physical Skills Involved:  1. Range of Motion  2. Balance  3. Strength  4. Activity Tolerance  5. Pain (acute) Cognitive Skills Affected (resulting in the inability to perform in a timely and safe manner):  1. None Psychosocial Skills Affected:  1. Habits/Routines  2. Environmental Adaptation   Number of elements that affect the Plan of Care: 5+:  HIGH COMPLEXITY   CLINICAL DECISION MAKIN [facility] [address] [id number] AM-PAC 6 Clicks   Daily Activity Inpatient Short Form  How much help from another person does the patient currently need. ..  Total A Lot A Little None   1. Putting on and taking off regular lower body clothing? [] 1   [x] 2   [] 3   [] 4   2. Bathing (including washing, rinsing, drying)? [] 1   [x] 2   [] 3   [] 4   3. Toileting, which includes using toilet, bedpan or urinal?   [] 1   [x] 2   [] 3   [] 4   4. Putting on and taking off regular upper body clothing? [] 1   [] 2   [x] 3   [] 4   5. Taking care of personal grooming such as brushing teeth? [] 1   [] 2   [x] 3   [] 4   6. Eating meals? [] 1   [] 2   [x] 3   [] 4   © 2007, Trustees of [facility], under license to [employer]. All rights reserved      Score:  Initial: 15 Most Recent: X (Date: -- )    Interpretation of Tool:  Represents activities that are increasingly more difficult (i.e. Bed mobility, Transfers, Gait). Score 24 23 22-20 19-15 14-10 9-7 6     Modifier CH CI CJ CK CL CM CN      ? Self Care:     - CURRENT STATUS: CK - 40%-59% impaired, limited or restricted    - GOAL STATUS: CJ - 20%-39% impaired, limited or restricted    - D/C STATUS:  ---------------To be determined---------------  [place] / [place] B / Product Type: Medicare /      Medical Necessity:     · Patient demonstrates good rehab potential due to higher previous functional level. Reason for Services/Other Comments:  · Patient continues to require present interventions due to patient's inability to care for self while maintaining hip precautions. Use of outcome tool(s) and clinical judgement create a POC that gives a: MODERATE COMPLEXITY         TREATMENT:   (In addition to Assessment/Re-Assessment sessions the following treatments were rendered)     Pre-treatment Symptoms/Complaints:  none  Pain: Initial:   Pain Intensity 1: 0  Post:  0     Group Therapeutic Exercise: (10 minutes):  Exercises per grid below to improve mobility and strength. Required minimal visual and verbal cues to promote proper body mechanics.   Progressed resistance, range and repetitions as indicated. Date:  11/7/17 Date:   Date:     Activity/Exercise Parameters Parameters Parameters   Shoulder flexion/extension 20 reps with red theraband     Shoulder horizontal add/abb 20 reps with red theraband     Punches 20 reps with red theraband     Elbow flexion/extension 20 reps with red theraband     Tricep extension      Balloon tapping      Ball toss              Braces/Orthotics/Lines/Etc:   · IV  · LLE knee immobilizer  · O2 Device: Nasal cannula  Treatment/Session Assessment:    · Response to Treatment:  Tolerated well   · Interdisciplinary Collaboration:   o Certified Occupational Therapy Assistant  o Registered Nurse  · After treatment position/precautions:   o RN notified  o back to room by rehab tech   · Compliance with Program/Exercises: compliant all of the time. · Recommendations/Intent for next treatment session: \"Next visit will focus on advancements to more challenging activities and reduction in assistance provided\".   Total Treatment Duration:  OT Patient Time In/Time Out  Time In: 1120  Time Out: [facility]

## 2017-11-07 NOTE — PROGRESS NOTES
Problem: Falls - Risk of  Goal: *Absence of Falls  Document Cedric Fall Risk and appropriate interventions in the flowsheet.    Outcome: Progressing Towards Goal  Fall Risk Interventions:  Mobility Interventions: Bed/chair exit alarm, Communicate number of staff needed for ambulation/transfer, Patient to call before getting OOB         Medication Interventions: Bed/chair exit alarm, Patient to call before getting OOB    Elimination Interventions: Call light in reach, Patient to call for help with toileting needs, Bed/chair exit alarm    History of Falls Interventions: Bed/chair exit alarm, Door open when patient unattended

## 2017-11-07 NOTE — PROGRESS NOTES
Problem: Interdisciplinary Rounds  Goal: Interdisciplinary Rounds  Outcome: Progressing Towards Goal  Interdisciplinary team rounds were held 11/6/17 with the following team members:Care Management, Physical Therapy and . Anticipate discharge to [facility]. Plan of care discussed. See clinical pathway and/or care plan for interventions and desired outcomes.

## 2017-11-07 NOTE — CONSULTS
Geriatric Fracture Consult  Patient: Sameera Al  YOB: 1945   MRN: [id number]      Consult Date: 11/5/2017     Consulting Physician: DR Silas Moreno    Chief Complaint: LEFT FEMORAL NECK FRACTURE; OSTEOPOROSIS  History of Present Illness: Malika Anderson is a 80 y.o.  male who is being seen for left hip pain after sustaining a fall at home on Friday when he tripped over the dog gate. Onset of symptoms was abrupt with unchanged course since that time. The pain is located in the left hip. Patient describes the pain as continuous and rated as moderate. Pain has been associated with movement. Patient denies other injuries. Review of Systems: A comprehensive review of systems was negative except for that written in the History of Present Illness. ED Presentation Time: < 8 hours  Mechanism of Injury: Fall from standing  Ambulatory Status: Independent  Past Medical History:   Past Medical History:   Diagnosis Date    Anxiety     Arthritis     Atrial fibrillation (HCC)     paroxysmal Afib, last bout 3/2016    BPH (benign prostatic hypertrophy)     CAD (coronary artery disease)     Cancer ([address]     prostate dx 01/2017 had a prostectomy    Chronic kidney disease     renal insufficiency , also has renal mass    Chronic pain     joint    Elevated PSA     Erectile dysfunction     GERD (gastroesophageal reflux disease)     controlled with meds     High blood pressure 10/26/2017    High cholesterol 10/26/2017    Hypercholesteremia     Hypertension     managed with medication    Macular degeneration     Personal history of prostate cancer     Psychiatric disorder     anxiety    Spondylitis ([address]     last infusion 9-2015    Ulcerative colitis ([address]     Ulcerative colitis ([address]     Vitamin D deficiency     Wears dentures     uppers        Allergies:    Allergies   Allergen Reactions    Ambien [Zolpidem] Other (comments)     Confusion      Codeine Other (comments)     \"jittery\"       Past Surgical History:   Past Surgical History:   Procedure Laterality Date    HX APPENDECTOMY  age 30's   Cash [employer] CATARACT REMOVAL Bilateral     IOL implants    HX COLONOSCOPY      HX HERNIA REPAIR Bilateral 2013    HX HERNIA REPAIR      umbilical    HX LAP CHOLECYSTECTOMY  2013    HX NEPHRECTOMY Left 2017    HX ORTHOPAEDIC Right     achilles tendon repair    HX RETINAL DETACHMENT REPAIR        Social History:   Social History     Social History    Marital status:      Spouse name: N/A    Number of children: N/A    Years of education: N/A     Occupational History    Not on file. 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 FAMILY HISTORY - REVIEWED - NO PERTINENT FAMILY HISTORY  Dwelling Status: Alone with Spouse/Significant Other  Current Anticoagulant Medications: Aspirin 81 MG/DAY  History of falls: NO  Prior Fractures: DENIES  Osteoporosis Medications: VITAMIN D2 50,000 UNITS/DAY  Bone Density Tests: NO  X-RAYS: Left Displaced Femoral Neck Fracture  Physical Exam:   PATIENT COMPLAINING OF LEFT HIP PAIN AFTER A FALL AT HOME. FOCUSED MUSCULOSKELETAL EXAM REVEALS DECREASED ROM TO LEFT LE. THERE IS SHORTENING AND EXTERNAL ROTATION NOTED TO LEFT LE. PATIENT IS TENDER TO PALPATION OVER LEFT HIP AND GROIN. PATIENT HAS PAIN WITH LOG ROLLING. N/V INTACT. DENIES OTHER INJURIES.     Assessment / Plan: LEFT HIP HEMIARTHROPLASTY; CONSULT PT/OT - WBAT LEFT LE; STR  RISKS AND BENEFITS WERE ADDRESSED WITH PATIENT AND FAMILY  Labs:    Lab Results   Component Value Date/Time    HGB 9.1 11/06/2017 06:59 AM    WBC 9.2 11/06/2017 06:59 AM    INR 1.0 11/05/2017 02:10 AM    Albumin 3.6 01/29/2017 05:28 PM      Preoperative Clearance: YES by Hospitalist          Signed by: Cele Armenta NP   Today's Date: November 7, 2017

## 2017-11-07 NOTE — PROGRESS NOTES
Progress Note    Patient: Sean Coburn MRN: [id number]  SSN: [id number]    YOB: 1945  Age: 80 y.o. Sex: male      Admit Date: 11/5/2017    LOS: 2 days     Subjective:     Shellie Tiwari is a 79 yo M directly admitted 11/5 from [facility] for L hip fracture after falling on Friday. S/P left hip hemiarthroplasty 11/5/17. Pt seen with wife today at bedside and had no new complaints. Pt and wife state they aere working with social work to [employer]. Objective:     Vitals:    11/06/17 2337 11/07/17 0428 11/07/17 0819 11/07/17 1127   BP: 169/84 176/86 144/73 161/76   Pulse: 78 74 74 74   Resp: 18 18 20 20   Temp: 98.8 °F (37.1 °C) 97.9 °F (36.6 °C) 97.9 °F (36.6 °C) 98.6 °F (37 °C)   SpO2: 94% 96% 94% 96%        Intake and Output:  Current Shift:    Last three shifts: 11/05 1901 - 11/07 0700  In: 480 [P.O.:480]  Out: 950 [Urine:950]    Physical Exam:   GENERAL: alert, cooperative, no distress, appears stated age  LUNG: clear to auscultation bilaterally  HEART: regular rate and rhythm, S1, S2 normal, no murmur, click, rub or gallop  ABDOMEN: soft, non-tender. Bowel sounds normal. No masses,  no organomegaly  SKIN: left hip dressing C/D/I  NEUROLOGIC: AOx3. Gait normal. Reflexes and motor strength normal and symmetric. Cranial nerves 2-12 and sensation grossly intact.   PSYCHIATRIC: non focal    Lab/Data Review:  BMP:   Lab Results   Component Value Date/Time     11/07/2017 07:16 AM    K 4.2 11/07/2017 07:16 AM     11/07/2017 07:16 AM    CO2 23 11/07/2017 07:16 AM    AGAP 9 11/07/2017 07:16 AM    GLU 98 11/07/2017 07:16 AM    BUN 38 (H) 11/07/2017 07:16 AM    CREA 2.91 (H) 11/07/2017 07:16 AM    GFRAA 28 (L) 11/07/2017 07:16 AM    GFRNA 23 (L) 11/07/2017 07:16 AM     CBC:   Lab Results   Component Value Date/Time    WBC 10.7 11/07/2017 07:16 AM    HGB 9.3 (L) 11/07/2017 07:16 AM    HCT 28.2 (L) 11/07/2017 07:16 AM     11/07/2017 07:16 AM     COAGS: No results found for: APTT, PTP, INR  Liver Panel: No results found for: ALB, CBIL, TBIL, TP, GLOB, AGRAT, SGOT, ASTPOC, ALTPOC, ALT, GPT, AP       Assessment:     Principal Problem:    Closed left hip fracture ([facility] (11/5/2017)    Active Problems:    BPH (benign prostatic hyperplasia) (7/23/2015)      Paroxysmal atrial fibrillation (HCC) (4/13/2017)      High blood pressure (10/26/2017)      Chronic kidney disease (11/5/2017)      Closed right hip fracture ([facility] (11/5/2017)        Plan:     S/P Left hip hemiarthroplasty POD #2  - Cont routine post op care    - Appreciated PM&R note; STR planned      HTN  - Cont BP meds     CAD prophylaxis   - 11/6 Restarted ASA 81mg po daily         DVT Prophylaxis:   - Lovenox post-op     FEN  - Reg     Dispo  - To STR once arrangements finalized     Signed By: Elva Packer MD     November 7, 2017

## 2017-11-08 VITALS
HEART RATE: 74 BPM | BODY MASS INDEX: 25.83 KG/M2 | SYSTOLIC BLOOD PRESSURE: 142 MMHG | RESPIRATION RATE: 19 BRPM | TEMPERATURE: 98 F | WEIGHT: 180 LBS | OXYGEN SATURATION: 98 % | DIASTOLIC BLOOD PRESSURE: 75 MMHG

## 2017-11-08 LAB
ANION GAP SERPL CALC-SCNC: 7 MMOL/L (ref 7–16)
BASOPHILS # BLD: 0 K/UL (ref 0–0.2)
BASOPHILS NFR BLD: 0 % (ref 0–2)
BUN SERPL-MCNC: 43 MG/DL (ref 8–23)
CALCIUM SERPL-MCNC: 10.4 MG/DL (ref 8.3–10.4)
CHLORIDE SERPL-SCNC: 105 MMOL/L (ref 98–107)
CO2 SERPL-SCNC: 27 MMOL/L (ref 21–32)
CREAT SERPL-MCNC: 2.87 MG/DL (ref 0.8–1.5)
DIFFERENTIAL METHOD BLD: ABNORMAL
EOSINOPHIL # BLD: 0.3 K/UL (ref 0–0.8)
EOSINOPHIL NFR BLD: 3 % (ref 0.5–7.8)
ERYTHROCYTE [DISTWIDTH] IN BLOOD BY AUTOMATED COUNT: 14.2 % (ref 11.9–14.6)
GLUCOSE SERPL-MCNC: 88 MG/DL (ref 65–100)
HCT VFR BLD AUTO: 27.9 % (ref 41.1–50.3)
HGB BLD-MCNC: 9.5 G/DL (ref 13.6–17.2)
IMM GRANULOCYTES # BLD: 0 K/UL (ref 0–0.5)
IMM GRANULOCYTES NFR BLD: 0 % (ref 0–5)
LYMPHOCYTES # BLD: 1.2 K/UL (ref 0.5–4.6)
LYMPHOCYTES NFR BLD: 12 % (ref 13–44)
MAGNESIUM SERPL-MCNC: 2.3 MG/DL (ref 1.8–2.4)
MCH RBC QN AUTO: 32.2 PG (ref 26.1–32.9)
MCHC RBC AUTO-ENTMCNC: 34.1 G/DL (ref 31.4–35)
MCV RBC AUTO: 94.6 FL (ref 79.6–97.8)
MM INDURATION POC: 0 MM (ref 0–5)
MONOCYTES # BLD: 0.9 K/UL (ref 0.1–1.3)
MONOCYTES NFR BLD: 9 % (ref 4–12)
NEUTS SEG # BLD: 7.3 K/UL (ref 1.7–8.2)
NEUTS SEG NFR BLD: 76 % (ref 43–78)
PHOSPHATE SERPL-MCNC: 3.2 MG/DL (ref 2.3–3.7)
PLATELET # BLD AUTO: 220 K/UL (ref 150–450)
PMV BLD AUTO: 9.5 FL (ref 10.8–14.1)
POTASSIUM SERPL-SCNC: 4.4 MMOL/L (ref 3.5–5.1)
PPD POC: NEGATIVE NEGATIVE
RBC # BLD AUTO: 2.95 M/UL (ref 4.23–5.67)
SODIUM SERPL-SCNC: 139 MMOL/L (ref 136–145)
WBC # BLD AUTO: 9.6 K/UL (ref 4.3–11.1)

## 2017-11-08 PROCEDURE — 84100 ASSAY OF PHOSPHORUS: CPT | Performed by: ORTHOPAEDIC SURGERY

## 2017-11-08 PROCEDURE — 74011636637 HC RX REV CODE- 636/637: Performed by: HOSPITALIST

## 2017-11-08 PROCEDURE — 74011250637 HC RX REV CODE- 250/637: Performed by: INTERNAL MEDICINE

## 2017-11-08 PROCEDURE — 80048 BASIC METABOLIC PNL TOTAL CA: CPT | Performed by: ORTHOPAEDIC SURGERY

## 2017-11-08 PROCEDURE — 36415 COLL VENOUS BLD VENIPUNCTURE: CPT | Performed by: ORTHOPAEDIC SURGERY

## 2017-11-08 PROCEDURE — 74011250637 HC RX REV CODE- 250/637: Performed by: HOSPITALIST

## 2017-11-08 PROCEDURE — 74011250637 HC RX REV CODE- 250/637: Performed by: ORTHOPAEDIC SURGERY

## 2017-11-08 PROCEDURE — 83735 ASSAY OF MAGNESIUM: CPT | Performed by: ORTHOPAEDIC SURGERY

## 2017-11-08 PROCEDURE — 85025 COMPLETE CBC W/AUTO DIFF WBC: CPT | Performed by: ORTHOPAEDIC SURGERY

## 2017-11-08 RX ORDER — HYDROCODONE BITARTRATE AND ACETAMINOPHEN 7.5; 325 MG/1; MG/1
2 TABLET ORAL
Qty: 25 TAB | Refills: 0 | Status: SHIPPED | OUTPATIENT
Start: 2017-11-08 | End: 2018-01-19

## 2017-11-08 RX ORDER — LOSARTAN POTASSIUM 50 MG/1
50 TABLET ORAL DAILY
Qty: 1 TAB | Refills: 0 | Status: SHIPPED
Start: 2017-11-08

## 2017-11-08 RX ORDER — ENOXAPARIN SODIUM 100 MG/ML
30 INJECTION SUBCUTANEOUS EVERY 24 HOURS
Qty: 8.4 ML | Refills: 0 | Status: SHIPPED
Start: 2017-11-08 | End: 2017-12-06

## 2017-11-08 RX ADMIN — ACETAMINOPHEN 650 MG: 325 TABLET ORAL at 05:41

## 2017-11-08 RX ADMIN — ASPIRIN 81 MG: 81 TABLET, COATED ORAL at 08:18

## 2017-11-08 RX ADMIN — ATORVASTATIN CALCIUM 40 MG: 40 TABLET, FILM COATED ORAL at 08:19

## 2017-11-08 RX ADMIN — OXYCODONE HYDROCHLORIDE 5 MG: 5 TABLET ORAL at 08:18

## 2017-11-08 RX ADMIN — Medication 10 ML: at 05:41

## 2017-11-08 RX ADMIN — FLECAINIDE ACETATE 50 MG: 100 TABLET ORAL at 08:18

## 2017-11-08 RX ADMIN — MESALAMINE 800 MG: 800 TABLET, DELAYED RELEASE ORAL at 08:18

## 2017-11-08 RX ADMIN — PREDNISONE 5 MG: 5 TABLET ORAL at 08:18

## 2017-11-08 RX ADMIN — OYSTER SHELL CALCIUM WITH VITAMIN D 1 TABLET: 500; 200 TABLET, FILM COATED ORAL at 08:18

## 2017-11-08 NOTE — PROGRESS NOTES
ORTH FRACTURE PROGRESS NOTE    2017  Admit Date:   2017    Post Op day: 2 Days Post-Op    Subjective:    Pedraza Agent IN BED     PT/OT:   Gait:  Gait  Step Length: Left shortened, Right shortened  Gait Abnormalities: Decreased step clearance  Ambulation - Level of Assistance: Stand-by asssistance, Contact guard assistance  Distance (ft): 250 Feet (ft)  Assistive Device: Walker, rolling  Interventions: Safety awareness training, Verbal cues            Interventions: Safety awareness training, Verbal cues    Vital Signs:    Patient Vitals for the past 8 hrs:   BP Temp Pulse Resp SpO2   17 0816 142/75 98 °F (36.7 °C) 74 19 98 %   17 0321 160/88 99 °F (37.2 °C) 73 20 95 %     Temp (24hrs), Av °F (37.2 °C), Min:98 °F (36.7 °C), Max:99.6 °F (37.6 °C)      Pain Control:   Pain Assessment  Pain Scale 1: Numeric (0 - 10)  Pain Intensity 1: 5  Pain Onset 1: post op  Pain Location 1: Leg  Pain Orientation 1: Left  Pain Description 1: Aching  Pain Intervention(s) 1: Medication (see MAR)    Meds:    Current Facility-Administered Medications   Medication Dose Route Frequency    aspirin delayed-release tablet 81 mg  81 mg Oral DAILY    atorvastatin (LIPITOR) tablet 40 mg  40 mg Oral DAILY    dilTIAZem CD (CARDIZEM CD) capsule 180 mg  180 mg Oral DAILY WITH DINNER    ergocalciferol (ERGOCALCIFEROL) capsule 50,000 Units  50,000 Units Oral every Monday    flecainide (TAMBOCOR) tablet 50 mg  50 mg Oral Q12H    DULoxetine (CYMBALTA) capsule 30 mg  30 mg Oral DAILY WITH DINNER    HYDROcodone-acetaminophen (NORCO) 7.5-325 mg per tablet 2 Tab  2 Tab Oral Q4H PRN    mesalamine DR (ASACOL HD) tablet 800 mg  800 mg Oral BID    predniSONE (DELTASONE) tablet 5 mg  5 mg Oral DAILY WITH BREAKFAST    hydrALAZINE (APRESOLINE) 20 mg/mL injection 10 mg  10 mg IntraVENous Q6H PRN    losartan/hydroCHLOROthiazide (HYZAAR) 100/25 mg   Oral QHS    0.9% sodium chloride infusion 2,000 mL  2,000 mL IntraVENous CONTINUOUS    HYDROmorphone (PF) (DILAUDID) injection 0.5 mg  0.5 mg IntraVENous Q4H PRN    traMADol (ULTRAM) tablet 50 mg  50 mg Oral Q6H PRN    lactated Ringers infusion  75 mL/hr IntraVENous CONTINUOUS    sodium chloride (NS) flush 5-10 mL  5-10 mL IntraVENous Q8H    sodium chloride (NS) flush 5-10 mL  5-10 mL IntraVENous PRN    ondansetron (ZOFRAN) injection 4 mg  4 mg IntraVENous Q4H PRN    alum-mag hydroxide-simeth (MYLANTA) oral suspension 30 mL  30 mL Oral Q4H PRN    calcium-vitamin D (OS-DMITRIY) 500 mg-200 unit tablet  1 Tab Oral TID WITH MEALS    acetaminophen (TYLENOL) tablet 650 mg  650 mg Oral Q8H    oxyCODONE IR (ROXICODONE) tablet 5 mg  5 mg Oral Q4H PRN    enoxaparin (LOVENOX) injection 30 mg  30 mg SubCUTAneous Q24H    celecoxib (CELEBREX) capsule 200 mg  200 mg Oral ONCE PRN       LAB:    Recent Labs      11/08/17   0546   HCT  27.9*   HGB  9.5*       24 Hour Assessment Issues:    Oriented    Discharge Planning: SNF VS HOME    Transfuse PRBC's:      Assessment & Physician's Comment:  Dressing is clean, dry, and intact  Neurovascular checks within normal limits    Principal Problem:    Closed left hip fracture ([address] (11/5/2017)    Active Problems:    BPH (benign prostatic hyperplasia) (7/23/2015)      Paroxysmal atrial fibrillation (HCC) (4/13/2017)      High blood pressure (10/26/2017)      Chronic kidney disease (11/5/2017)      Closed right hip fracture ([address] (11/5/2017)        Plan:  CONTINUE THERAPY   PATIENT AND SPOUSE WANT TO GO HOME - PATIENT WALKED 250 FEET   WILL SET UP KATHERINE Deshpande

## 2017-11-08 NOTE — DISCHARGE SUMMARY
Hospitalist Discharge Summary     Patient ID:  Gianluca Do  [id number]  80 y.o.  1945  Admit date: 11/5/2017  4:57 AM  Discharge date and time: 11/8/2017  Attending: Chantal Trinh MD  PCP:  Ramona Guadarrama MD  Treatment Team: Attending Provider: Chantal Trinh MD; Utilization Review: Neetu Couch RN; Care Manager: Xu Zepeda RN; Consulting Provider: Javier Davidson MD    Principal Diagnosis Closed left hip fracture [facility])   Principal Problem:    Closed left hip fracture ([facility] (11/5/2017)    Active Problems:    BPH (benign prostatic hyperplasia) (7/23/2015)      Paroxysmal atrial fibrillation ([facility] (4/13/2017)      High blood pressure (10/26/2017)      Chronic kidney disease (11/5/2017)      Closed right hip fracture ([facility] (11/5/2017)     HPI: 81 y/o gentleman with prior hx CAD, PAF (last in 2016, on asa), HTN, renal cell carcinoma, s/p left nephrectomy March 2017, prostate cancer, s/p RALP 1/2017 presents to the ED tonight with persistent left hip pain and inability to walk or weight bear. Friday morning he was trying to get over a dog gate when he slipped and fell onto his left hip and shoulder. Went to [facility] and xrays were negative for fracture. He did not sustain any head injury or LOC. Tried to weight-bear or ambulate tonight with a walker but could not so came to [facility] ED. xrays tonight reveal subcapital left femoral neck fracture. He will be transferred to [facility] for definitive surgical repair. He denies any recent cardiac complaints, no chest pain, palpitations, shortness of breath, fever or chills. No bleeding tendencies. He is hemodynamically stable for transfer tonight via EMS. Hospital Course:  Please refer to the admission H&P for details of presentation. In summary, the patient presented s/p fall and found to have a  L hip fracture.  S/P left hip hemiarthroplasty 11/5/17 and tolerated procedure well. Pt will continue lovenox for 30 days post op. Will benefit from continued rehab. Currently stable for transfer. Will follow up with pcp, ortho. Labs: Results:       Chemistry Recent Labs      11/08/17 0546 11/07/17 0716  11/05/17   0923   GLU  88  98   --    NA  139  139   --    K  4.4  4.2   --    CL  105  107   --    CO2  27  23   --    BUN  43*  38*   --    CREA  2.87*  2.91*   --    CA  10.4  9.7  8.2*   AGAP  7  9   --       CBC w/Diff Recent Labs      11/08/17 0546  11/07/17   0716  11/06/17   0659   WBC  9.6  10.7  9.2   RBC  2.95*  2.90*  2.84*   HGB  9.5*  9.3*  9.1*   HCT  27.9*  28.2*  27.8*   PLT  220  212  185   GRANS  76  75  77   LYMPH  12*  13  15   EOS  3  2  1      Cardiac Enzymes No results for input(s): CPK, CKND1, JOSE in the last 72 hours. No lab exists for component: CKRMB, TROIP   Coagulation Recent Labs      11/05/17   0923   APTT  24.4       Lipid Panel No results found for: CHOL, CHOLPOCT, CHOLX, CHLST, CHOLV, 747009, HDL, LDL, LDLC, DLDLP, 684106, VLDLC, VLDL, TGLX, TRIGL, TRIGP, TGLPOCT, CHHD, CHHDX   BNP No results for input(s): BNPP in the last 72 hours. Liver Enzymes No results for input(s): TP, ALB, TBIL, AP, SGOT, GPT in the last 72 hours. No lab exists for component: DBIL   Thyroid Studies No results found for: T4, T3U, TSH, TSHEXT         Discharge Exam:  Visit Vitals    /75 (BP 1 Location: Right arm, BP Patient Position: At rest)    Pulse 74    Temp 98 °F (36.7 °C)    Resp 19    SpO2 98%     General appearance: alert, cooperative, no distress, appears stated age  Lungs: clear to auscultation bilaterally  Heart: regular rate and rhythm, S1, S2 normal, no murmur, click, rub or gallop  Abdomen: soft, non-tender.  Bowel sounds normal. No masses,  no organomegaly  Extremities: no cyanosis or edema  Neurologic: Grossly normal    Disposition: SNF  Discharge Condition: stable  Patient Instructions:   Current Discharge Medication List      START taking these medications    Details   enoxaparin (LOVENOX) 30 mg/0.3 mL injection 0.3 mL by SubCUTAneous route every twenty-four (24) hours for 28 days. Qty: 8.4 mL, Refills: 0      losartan (COZAAR) 50 mg tablet Take 1 Tab by mouth daily. Qty: 1 Tab, Refills: 0         CONTINUE these medications which have CHANGED    Details   HYDROcodone-acetaminophen (NORCO) 7.5-325 mg per tablet Take 2 Tabs by mouth every six (6) hours as needed. Max Daily Amount: 8 Tabs. Qty: 25 Tab, Refills: 0         CONTINUE these medications which have NOT CHANGED    Details   aspirin delayed-release 81 mg tablet Take  by mouth daily. mesalamine DR (ASACOL HD) 800 mg DR tablet Take 800 mg by mouth two (2) times a day. dilTIAZem XR (DILACOR XR) 180 mg XR capsule Take 180 mg by mouth daily (with dinner). flecainide (TAMBOCOR) 50 mg tablet Take 50 mg by mouth every twelve (12) hours. DULoxetine (CYMBALTA) 30 mg capsule Take 30 mg by mouth daily (with dinner). ergocalciferol (VITAMIN D2) 50,000 unit capsule Take 50,000 Units by mouth every Monday. VIT A,C & E/B3/B2/LUT/MIN/GLUT (EYE-EMILY EXTRA + LUTEIN PO) Take  by mouth. atorvastatin (LIPITOR) 40 mg tablet Take 40 mg by mouth every morning. Take day of surgery per anesthesia protocol. Indications: HYPERCHOLESTEROLEMIA      predniSONE (DELTASONE) 5 mg tablet Take 5 mg by mouth daily. Indications: Ulcerative Colitis      tadalafil (CIALIS) 20 mg tablet Take 1 Tab by mouth as needed.   Qty: 6 Tab, Refills: 99         STOP taking these medications       losartan-hydrochlorothiazide (HYZAAR) 100-25 mg per tablet Comments:   Reason for Stopping:         IRBESARTAN/HYDROCHLOROTHIAZIDE (AVALIDE PO) Comments:   Reason for Stopping:               Activity: Activity as tolerated  Diet: Cardiac Diet  Wound Care: As directed    Follow-up  ·   With pcp  Time spent to discharge patient 35 minutes  Signed:  Kenny Tobar MD  11/8/2017  8:23 AM

## 2017-11-08 NOTE — PROGRESS NOTES
TRANSFER - OUT REPORT:    Verbal report given to Brendon Castillo RN(name) on Sean Coburn  being transferred to [facility](unit) for routine progression of care       Report consisted of patients Situation, Background, Assessment and   Recommendations(SBAR). Information from the following report(s) SBAR, MAR and Recent Results was reviewed with the receiving nurse. Lines:       Opportunity for questions and clarification was provided.

## 2017-11-08 NOTE — PROGRESS NOTES
ORTH FRACTURE PROGRESS NOTE    2017  Admit Date:   2017    Post Op day: 3 Days Post-Op    Subjective:    Gianluca Ice UP IN [place]; WIFE AT BEDSIDE     PT/OT:   Gait:  Gait  Step Length: Left shortened, Right shortened  Gait Abnormalities: Decreased step clearance  Ambulation - Level of Assistance: Stand-by asssistance, Contact guard assistance  Distance (ft): 250 Feet (ft)  Assistive Device: Walker, rolling  Interventions: Safety awareness training, Verbal cues            Interventions: Safety awareness training, Verbal cues    Vital Signs:    Patient Vitals for the past 8 hrs:   BP Temp Pulse Resp SpO2 Weight   17 0816 142/75 98 °F (36.7 °C) 74 19 98 % 81.6 kg (180 lb)   17 0321 160/88 99 °F (37.2 °C) 73 20 95 % -     Temp (24hrs), Av °F (37.2 °C), Min:98 °F (36.7 °C), Max:99.6 °F (37.6 °C)      Pain Control:   Pain Assessment  Pain Scale 1: Numeric (0 - 10)  Pain Intensity 1: 0  Pain Onset 1: post op  Pain Location 1: Leg  Pain Orientation 1: Left  Pain Description 1: Aching  Pain Intervention(s) 1: Medication (see MAR)    Meds:    Current Facility-Administered Medications   Medication Dose Route Frequency    aspirin delayed-release tablet 81 mg  81 mg Oral DAILY    atorvastatin (LIPITOR) tablet 40 mg  40 mg Oral DAILY    dilTIAZem CD (CARDIZEM CD) capsule 180 mg  180 mg Oral DAILY WITH DINNER    ergocalciferol (ERGOCALCIFEROL) capsule 50,000 Units  50,000 Units Oral every Monday    flecainide (TAMBOCOR) tablet 50 mg  50 mg Oral Q12H    DULoxetine (CYMBALTA) capsule 30 mg  30 mg Oral DAILY WITH DINNER    HYDROcodone-acetaminophen (NORCO) 7.5-325 mg per tablet 2 Tab  2 Tab Oral Q4H PRN    mesalamine DR (ASACOL HD) tablet 800 mg  800 mg Oral BID    predniSONE (DELTASONE) tablet 5 mg  5 mg Oral DAILY WITH BREAKFAST    hydrALAZINE (APRESOLINE) 20 mg/mL injection 10 mg  10 mg IntraVENous Q6H PRN    losartan/hydroCHLOROthiazide (HYZAAR) 100/25 mg   Oral QHS    0.9% sodium chloride infusion 2,000 mL  2,000 mL IntraVENous CONTINUOUS    HYDROmorphone (PF) (DILAUDID) injection 0.5 mg  0.5 mg IntraVENous Q4H PRN    traMADol (ULTRAM) tablet 50 mg  50 mg Oral Q6H PRN    lactated Ringers infusion  75 mL/hr IntraVENous CONTINUOUS    sodium chloride (NS) flush 5-10 mL  5-10 mL IntraVENous Q8H    sodium chloride (NS) flush 5-10 mL  5-10 mL IntraVENous PRN    ondansetron (ZOFRAN) injection 4 mg  4 mg IntraVENous Q4H PRN    alum-mag hydroxide-simeth (MYLANTA) oral suspension 30 mL  30 mL Oral Q4H PRN    calcium-vitamin D (OS-DMITRIY) 500 mg-200 unit tablet  1 Tab Oral TID WITH MEALS    acetaminophen (TYLENOL) tablet 650 mg  650 mg Oral Q8H    oxyCODONE IR (ROXICODONE) tablet 5 mg  5 mg Oral Q4H PRN    enoxaparin (LOVENOX) injection 30 mg  30 mg SubCUTAneous Q24H    celecoxib (CELEBREX) capsule 200 mg  200 mg Oral ONCE PRN       LAB:    Recent Labs      11/08/17   0546   HCT  27.9*   HGB  9.5*       24 Hour Assessment Issues:    Oriented    Discharge Planning: SNF VS HOME    Transfuse PRBC's:      Assessment & Physician's Comment:  Dressing is clean, dry, and intact  Neurovascular checks within normal limits    Principal Problem:    Closed left hip fracture ([facility] (11/5/2017)    Active Problems:    BPH (benign prostatic hyperplasia) (7/23/2015)      Paroxysmal atrial fibrillation (HCC) (4/13/2017)      High blood pressure (10/26/2017)      Chronic kidney disease (11/5/2017)      Closed right hip fracture ([facility] (11/5/2017)        Plan:  54 Leanna Contreras, NP

## 2017-11-08 NOTE — PROGRESS NOTES
Problem: Falls - Risk of  Goal: *Absence of Falls  Document Cedric Fall Risk and appropriate interventions in the flowsheet.    Outcome: Progressing Towards Goal  Fall Risk Interventions:  Mobility Interventions: Bed/chair exit alarm, Communicate number of staff needed for ambulation/transfer, Patient to call before getting OOB         Medication Interventions: Bed/chair exit alarm, Evaluate medications/consider consulting pharmacy, Patient to call before getting OOB    Elimination Interventions: Bed/chair exit alarm, Call light in reach, Patient to call for help with toileting needs    History of Falls Interventions: Bed/chair exit alarm, Door open when patient unattended, Evaluate medications/consider consulting pharmacy

## 2017-11-08 NOTE — PROGRESS NOTES
Problem: Falls - Risk of  Goal: *Absence of Falls  Document Cedric Fall Risk and appropriate interventions in the flowsheet.    Outcome: Resolved/Met Date Met: 11/08/17  Fall Risk Interventions:  Mobility Interventions: Bed/chair exit alarm         Medication Interventions: Bed/chair exit alarm    Elimination Interventions: Bed/chair exit alarm    History of Falls Interventions: Bed/chair exit alarm

## 2017-11-15 ENCOUNTER — HOME HEALTH ADMISSION (OUTPATIENT)
Dept: HOME HEALTH SERVICES | Facility: HOME HEALTH | Age: 72
End: 2017-11-15
Payer: MEDICARE

## 2017-11-20 ENCOUNTER — HOME CARE VISIT (OUTPATIENT)
Dept: SCHEDULING | Facility: HOME HEALTH | Age: 72
End: 2017-11-20
Payer: MEDICARE

## 2017-11-20 VITALS
RESPIRATION RATE: 18 BRPM | HEART RATE: 78 BPM | DIASTOLIC BLOOD PRESSURE: 76 MMHG | SYSTOLIC BLOOD PRESSURE: 118 MMHG | TEMPERATURE: 97.8 F

## 2017-11-20 PROCEDURE — 3331090001 HH PPS REVENUE CREDIT

## 2017-11-20 PROCEDURE — 3331090002 HH PPS REVENUE DEBIT

## 2017-11-20 PROCEDURE — G0151 HHCP-SERV OF PT,EA 15 MIN: HCPCS

## 2017-11-20 PROCEDURE — 400013 HH SOC

## 2017-11-21 ENCOUNTER — HOME CARE VISIT (OUTPATIENT)
Dept: HOME HEALTH SERVICES | Facility: HOME HEALTH | Age: 72
End: 2017-11-21
Payer: MEDICARE

## 2017-11-21 PROCEDURE — 3331090001 HH PPS REVENUE CREDIT

## 2017-11-21 PROCEDURE — 3331090002 HH PPS REVENUE DEBIT

## 2017-11-22 ENCOUNTER — HOME CARE VISIT (OUTPATIENT)
Dept: SCHEDULING | Facility: HOME HEALTH | Age: 72
End: 2017-11-22
Payer: MEDICARE

## 2017-11-22 PROCEDURE — 3331090001 HH PPS REVENUE CREDIT

## 2017-11-22 PROCEDURE — 3331090002 HH PPS REVENUE DEBIT

## 2017-11-22 PROCEDURE — G0151 HHCP-SERV OF PT,EA 15 MIN: HCPCS

## 2017-11-23 PROCEDURE — 3331090001 HH PPS REVENUE CREDIT

## 2017-11-23 PROCEDURE — 3331090002 HH PPS REVENUE DEBIT

## 2017-11-24 ENCOUNTER — HOME CARE VISIT (OUTPATIENT)
Dept: SCHEDULING | Facility: HOME HEALTH | Age: 72
End: 2017-11-24
Payer: MEDICARE

## 2017-11-24 VITALS — RESPIRATION RATE: 18 BRPM | HEART RATE: 82 BPM | SYSTOLIC BLOOD PRESSURE: 104 MMHG | DIASTOLIC BLOOD PRESSURE: 82 MMHG

## 2017-11-24 VITALS
HEART RATE: 82 BPM | RESPIRATION RATE: 17 BRPM | SYSTOLIC BLOOD PRESSURE: 146 MMHG | TEMPERATURE: 97 F | DIASTOLIC BLOOD PRESSURE: 80 MMHG

## 2017-11-24 PROCEDURE — G0157 HHC PT ASSISTANT EA 15: HCPCS

## 2017-11-24 PROCEDURE — 3331090002 HH PPS REVENUE DEBIT

## 2017-11-24 PROCEDURE — 3331090001 HH PPS REVENUE CREDIT

## 2017-11-25 PROCEDURE — 3331090001 HH PPS REVENUE CREDIT

## 2017-11-25 PROCEDURE — 3331090002 HH PPS REVENUE DEBIT

## 2017-11-26 PROCEDURE — 3331090001 HH PPS REVENUE CREDIT

## 2017-11-26 PROCEDURE — 3331090002 HH PPS REVENUE DEBIT

## 2017-11-27 ENCOUNTER — HOME CARE VISIT (OUTPATIENT)
Dept: SCHEDULING | Facility: HOME HEALTH | Age: 72
End: 2017-11-27
Payer: MEDICARE

## 2017-11-27 VITALS — RESPIRATION RATE: 18 BRPM | SYSTOLIC BLOOD PRESSURE: 138 MMHG | DIASTOLIC BLOOD PRESSURE: 72 MMHG | HEART RATE: 66 BPM

## 2017-11-27 PROCEDURE — G0151 HHCP-SERV OF PT,EA 15 MIN: HCPCS

## 2017-11-27 PROCEDURE — 3331090003 HH PPS REVENUE ADJ

## 2017-11-27 PROCEDURE — 3331090001 HH PPS REVENUE CREDIT

## 2017-11-27 PROCEDURE — 3331090002 HH PPS REVENUE DEBIT

## 2017-11-28 PROCEDURE — 3331090002 HH PPS REVENUE DEBIT

## 2017-11-28 PROCEDURE — 3331090001 HH PPS REVENUE CREDIT

## 2017-11-29 PROCEDURE — 3331090002 HH PPS REVENUE DEBIT

## 2017-11-29 PROCEDURE — 3331090001 HH PPS REVENUE CREDIT

## 2017-11-30 PROCEDURE — 3331090002 HH PPS REVENUE DEBIT

## 2017-11-30 PROCEDURE — 3331090001 HH PPS REVENUE CREDIT

## 2017-12-01 PROCEDURE — 3331090001 HH PPS REVENUE CREDIT

## 2017-12-01 PROCEDURE — 3331090002 HH PPS REVENUE DEBIT

## 2017-12-02 PROCEDURE — 3331090001 HH PPS REVENUE CREDIT

## 2017-12-02 PROCEDURE — 3331090002 HH PPS REVENUE DEBIT

## 2017-12-03 PROCEDURE — 3331090002 HH PPS REVENUE DEBIT

## 2017-12-03 PROCEDURE — 3331090001 HH PPS REVENUE CREDIT

## 2018-03-24 ENCOUNTER — HOSPITAL ENCOUNTER (OUTPATIENT)
Dept: MRI IMAGING | Age: 73
Discharge: HOME | End: 2018-03-24
Attending: NURSE PRACTITIONER
Payer: MEDICARE

## 2018-03-24 DIAGNOSIS — M54.50 LUMBAR SPINE PAIN: ICD-10-CM

## 2018-03-24 PROCEDURE — 72148 MRI LUMBAR SPINE W/O DYE: CPT

## 2018-07-06 ENCOUNTER — HOSPITAL ENCOUNTER (OUTPATIENT)
Dept: LAB | Age: 73
Discharge: HOME | End: 2018-07-06

## 2018-07-06 PROCEDURE — 88305 TISSUE EXAM BY PATHOLOGIST: CPT | Performed by: INTERNAL MEDICINE

## 2018-07-20 ENCOUNTER — HOSPITAL ENCOUNTER (OUTPATIENT)
Dept: CT IMAGING | Age: 73
Discharge: HOME | End: 2018-07-20
Attending: UROLOGY
Payer: MEDICARE

## 2018-07-20 DIAGNOSIS — C64.2 RENAL CANCER, LEFT (HCC): ICD-10-CM

## 2018-07-20 PROCEDURE — 74176 CT ABD & PELVIS W/O CONTRAST: CPT

## 2018-10-30 ENCOUNTER — HOSPITAL ENCOUNTER (OUTPATIENT)
Dept: CT IMAGING | Age: 73
Discharge: HOME | End: 2018-10-30
Attending: INTERNAL MEDICINE
Payer: MEDICARE

## 2018-10-30 DIAGNOSIS — R91.1 PULMONARY NODULE: ICD-10-CM

## 2018-10-30 PROCEDURE — 71250 CT THORAX DX C-: CPT

## 2019-09-12 ENCOUNTER — HOSPITAL ENCOUNTER (OUTPATIENT)
Dept: ULTRASOUND IMAGING | Age: 74
Discharge: HOME | End: 2019-09-12
Attending: UROLOGY

## 2019-09-12 DIAGNOSIS — C64.9 MALIGNANT NEOPLASM OF KIDNEY, UNSPECIFIED LATERALITY (HCC): ICD-10-CM

## 2019-09-12 DIAGNOSIS — C64.2 MALIGNANT NEOPLASM OF LEFT KIDNEY (HCC): ICD-10-CM

## 2019-11-04 ENCOUNTER — HOSPITAL ENCOUNTER (OUTPATIENT)
Dept: CT IMAGING | Age: 74
Discharge: HOME | End: 2019-11-04
Attending: INTERNAL MEDICINE

## 2019-11-04 DIAGNOSIS — R91.1 PULMONARY NODULE: ICD-10-CM

## 2020-01-01 ENCOUNTER — HOSPITAL ENCOUNTER (OUTPATIENT)
Dept: CT IMAGING | Age: 75
Discharge: HOME | End: 2020-09-04
Attending: UROLOGY

## 2020-01-01 DIAGNOSIS — C64.2 RENAL CANCER, LEFT (HCC): ICD-10-CM

## 2021-01-01 ENCOUNTER — HOSPITAL ENCOUNTER (INPATIENT)
Age: 76
LOS: 1 days | End: 2021-02-27
Attending: INTERNAL MEDICINE | Admitting: INTERNAL MEDICINE
Payer: MEDICARE

## 2021-01-01 ENCOUNTER — HOSPICE ADMISSION (OUTPATIENT)
Dept: HOSPICE | Facility: HOSPICE | Age: 76
End: 2021-01-01
Payer: MEDICARE

## 2021-01-01 VITALS
RESPIRATION RATE: 24 BRPM | HEART RATE: 86 BPM | TEMPERATURE: 99.7 F | DIASTOLIC BLOOD PRESSURE: 68 MMHG | SYSTOLIC BLOOD PRESSURE: 130 MMHG

## 2021-01-01 PROCEDURE — 74011000250 HC RX REV CODE- 250: Performed by: INTERNAL MEDICINE

## 2021-01-01 PROCEDURE — 74011250636 HC RX REV CODE- 250/636

## 2021-01-01 PROCEDURE — 74011250636 HC RX REV CODE- 250/636: Performed by: INTERNAL MEDICINE

## 2021-01-01 PROCEDURE — 3336500001 HSPC ELECTION

## 2021-01-01 PROCEDURE — 0656 HSPC GENERAL INPATIENT

## 2021-01-01 RX ORDER — MORPHINE SULFATE 4 MG/ML
4 INJECTION INTRAVENOUS
Status: DISCONTINUED | OUTPATIENT
Start: 2021-01-01 | End: 2021-01-01 | Stop reason: HOSPADM

## 2021-01-01 RX ORDER — GLYCOPYRROLATE 0.2 MG/ML
0.2 INJECTION INTRAMUSCULAR; INTRAVENOUS
Status: DISCONTINUED | OUTPATIENT
Start: 2021-01-01 | End: 2021-01-01 | Stop reason: HOSPADM

## 2021-01-01 RX ORDER — HALOPERIDOL 5 MG/ML
2 INJECTION INTRAMUSCULAR
Status: DISCONTINUED | OUTPATIENT
Start: 2021-01-01 | End: 2021-01-01 | Stop reason: HOSPADM

## 2021-01-01 RX ORDER — MORPHINE SULFATE 4 MG/ML
4 INJECTION, SOLUTION INTRAMUSCULAR; INTRAVENOUS
Status: DISCONTINUED | OUTPATIENT
Start: 2021-01-01 | End: 2021-01-01 | Stop reason: CLARIF

## 2021-01-01 RX ORDER — ACETAMINOPHEN 325 MG/1
650 TABLET ORAL
Status: DISCONTINUED | OUTPATIENT
Start: 2021-01-01 | End: 2021-01-01 | Stop reason: HOSPADM

## 2021-01-01 RX ORDER — ACETAMINOPHEN 650 MG/1
650 SUPPOSITORY RECTAL
Status: DISCONTINUED | OUTPATIENT
Start: 2021-01-01 | End: 2021-01-01 | Stop reason: HOSPADM

## 2021-01-01 RX ORDER — SODIUM CHLORIDE 0.9 % (FLUSH) 0.9 %
3 SYRINGE (ML) INJECTION AS NEEDED
Status: DISCONTINUED | OUTPATIENT
Start: 2021-01-01 | End: 2021-01-01 | Stop reason: HOSPADM

## 2021-01-01 RX ORDER — MORPHINE SULFATE 100 MG/5ML
10 SOLUTION ORAL
Status: DISCONTINUED | OUTPATIENT
Start: 2021-01-01 | End: 2021-01-01 | Stop reason: HOSPADM

## 2021-01-01 RX ORDER — LORAZEPAM 2 MG/ML
1 INJECTION INTRAMUSCULAR
Status: DISCONTINUED | OUTPATIENT
Start: 2021-01-01 | End: 2021-01-01 | Stop reason: HOSPADM

## 2021-01-01 RX ORDER — MORPHINE SULFATE 4 MG/ML
INJECTION INTRAVENOUS
Status: COMPLETED
Start: 2021-01-01 | End: 2021-01-01

## 2021-01-01 RX ORDER — HYOSCYAMINE SULFATE 0.12 MG/1
0.12 TABLET SUBLINGUAL
Status: DISCONTINUED | OUTPATIENT
Start: 2021-01-01 | End: 2021-01-01 | Stop reason: HOSPADM

## 2021-01-01 RX ADMIN — SODIUM CHLORIDE, PRESERVATIVE FREE 3 ML: 5 INJECTION INTRAVENOUS at 03:54

## 2021-01-01 RX ADMIN — SODIUM CHLORIDE, PRESERVATIVE FREE 3 ML: 5 INJECTION INTRAVENOUS at 21:43

## 2021-01-01 RX ADMIN — LORAZEPAM 1 MG: 2 INJECTION INTRAMUSCULAR; INTRAVENOUS at 21:43

## 2021-01-01 RX ADMIN — MORPHINE SULFATE 4 MG: 4 INJECTION INTRAVENOUS at 21:43

## 2021-01-01 RX ADMIN — MORPHINE SULFATE 4 MG: 4 INJECTION, SOLUTION INTRAMUSCULAR; INTRAVENOUS at 15:00

## 2021-01-01 RX ADMIN — MORPHINE SULFATE 4 MG: 4 INJECTION INTRAVENOUS at 01:40

## 2021-01-01 RX ADMIN — MORPHINE SULFATE 4 MG: 4 INJECTION INTRAVENOUS at 15:00

## 2021-01-01 RX ADMIN — SODIUM CHLORIDE, PRESERVATIVE FREE 3 ML: 5 INJECTION INTRAVENOUS at 19:20

## 2021-01-01 RX ADMIN — SODIUM CHLORIDE, PRESERVATIVE FREE 3 ML: 5 INJECTION INTRAVENOUS at 14:55

## 2021-01-01 RX ADMIN — MORPHINE SULFATE 4 MG: 4 INJECTION INTRAVENOUS at 03:53

## 2021-01-01 RX ADMIN — MORPHINE SULFATE 4 MG: 4 INJECTION INTRAVENOUS at 19:20

## 2021-01-01 RX ADMIN — HALOPERIDOL LACTATE 2 MG: 5 INJECTION, SOLUTION INTRAMUSCULAR at 15:00

## 2021-01-01 RX ADMIN — SODIUM CHLORIDE, PRESERVATIVE FREE 3 ML: 5 INJECTION INTRAVENOUS at 01:40

## 2021-01-01 RX ADMIN — LORAZEPAM 1 MG: 2 INJECTION INTRAMUSCULAR; INTRAVENOUS at 03:53

## 2021-02-26 PROBLEM — C64.1 RENAL CELL CANCER, RIGHT (HCC): Status: ACTIVE | Noted: 2021-01-01

## 2021-02-26 PROBLEM — N18.5 RENAL FAILURE, CHRONIC, STAGE 5 (HCC): Status: ACTIVE | Noted: 2021-01-01

## 2021-02-26 PROBLEM — K85.90 ACUTE HEMORRHAGIC PANCREATITIS: Status: ACTIVE | Noted: 2021-01-01

## 2021-02-26 NOTE — PROGRESS NOTES
Patient is a 77year old  male admitted to [facility] from the hospital with the diagnosis of renal disease. He is a retired 's [place]. He lives with his spouse of 6 years, Everette Nowak. They do not have children together. He has a son from a previous marriage. Everette Nowak reports optimal support from their family and friends. They are considered non-[employer].  They plan to utilize the services of [employer] for cremation.

## 2021-02-26 NOTE — PROGRESS NOTES
1400- Patient arrived on the unit via ambulance transport from [address] [place] [address]. Patient is being admitted GIP with diagnosis of Progressive Renal Failure. The patient is on room air and has respirations at 16 even and unlabored. Patient was transferred to the bed from the stretcher with no problems with 3 assist. The patient has new area to upper abdomen where feeding tube was pulled before leaving. The area is draining green secretions. Cleaned with saline and placed new 4x4's and abdomen pad over the site. Coles catheter in place draining yellow urine. Picc double lumen in place to right arm. Lung sounds clear but diminished at base. Bowel sounds times four quads positive. Patient has edema to right knee and it is warm to touch. Patient has knot to later right foot. It is red and warm to touch. Feet are plus 2 edema and cool to touch. Patient has foam dressings to bilateral upper extremities. Will not remove as skin is fragile. Scattered healing scabbed areas to bilateral upper extremities. Stage two to sacral area that has excoriation around it. Patient is positioned for comfort. His heels are floated. The patient is grimacing and is restless. Will medicated when orders are in.  
 
1500- Medicated with Morphine 4 mg IV per order and Georgena Earing from pharmacy. The medication had to be gotten out of override. PICC flushed with normal saline as ordered. Educated the patient's wife, Yolanda Mann, on the medications and on the hospice philosophy. Yolanda Mann voiced understanding. 1530- Patient is resting quietly in the bed with no signs of pain or agitation. Family at the bedside. 1700- Patient continues to rest quietly in the bed. Wife remains at the bedside. Will report off to Lucretia Ray RN.

## 2021-02-26 NOTE — H&P
History and Physical 
 
Patient: Earlene Diaz MRN: [id number]  SSN: [id number] YOB: 1945  Age: 77 y.o. Sex: male Subjective:  
  
Earlene Diaz is a 77 y.o. 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  He also has had prostate cancer but there are no ultrasonographic evidence of obstruction.  He presented with abdominal pain and GI bleeding felt to be due to hemorrhagic pancreatitis.  Treated with GDA embolization and PEG jejunostomy but despite aggressive therapy he has had continued cryptogenic GI bleeding and progressive renal insufficiency.  Serum creatinine now is approaching 5 and BUN is approaching 100.  He has become a little delirious and little short of breath but is only required 1 dose of intravenous morphine in the past 24 hours.  Is not felt that further diagnostic or therapeutic interventions will be of benefit.  The patient is a DNR.  The family desires comfort measures only at this time Past Medical History:  
Diagnosis Date  Anxiety  Arthritis  Atrial fibrillation (HCC)   
 paroxysmal Afib, last bout 3/2016  BPH (benign prostatic hypertrophy)  CAD (coronary artery disease)  Cancer [facility])   
 prostate dx 01/2017 had a prostectomy  Chronic kidney disease   
 renal insufficiency , also has renal mass  Chronic pain   
 joint  Elevated PSA  Erectile dysfunction  GERD (gastroesophageal reflux disease)   
 controlled with meds  High blood pressure 10/26/2017  High cholesterol 10/26/2017  Hypercholesteremia  Hypertension   
 managed with medication  Macular degeneration  Personal history of prostate cancer  Psychiatric disorder   
 anxiety  Spondylitis ([address]   
 last infusion 9-2015  Ulcerative colitis ([address]  Ulcerative colitis ([address]  Vitamin D deficiency  Wears dentures   
 uppers Past Surgical History:  
Procedure Laterality Date  HX APPENDECTOMY  age 30's  HX CATARACT REMOVAL Bilateral   
 IOL implants  HX COLONOSCOPY    
 HX HERNIA REPAIR Bilateral 2013  HX HERNIA REPAIR    
 umbilical  
 HX LAP CHOLECYSTECTOMY  2013  HX NEPHRECTOMY Left 2017  HX ORTHOPAEDIC Right   
 achilles tendon repair  HX RETINAL DETACHMENT REPAIR Family History Problem Relation Age of Onset  Heart Disease Father  Hypertension Father Social History Tobacco Use  Smoking status: Former Smoker Packs/day: 1.00 Years: 40.00 Pack years: 40.00 Quit date: 10/29/2005 Years since quitting: 15.3  Smokeless tobacco: Never Used Substance Use Topics  Alcohol use: Yes Alcohol/week: 3.0 standard drinks Types: 1 Glasses of wine, 2 Cans of beer per week Prior to Admission medications Medication Sig Start Date End Date Taking? Authorizing Provider  
losartan (COZAAR) 50 mg tablet Take 1 Tab by mouth daily. 11/8/17  Yes Timothy Ruiz MD  
aspirin delayed-release 81 mg tablet Take  by mouth daily. Yes Provider, Historical  
mesalamine DR (ASACOL HD) 800 mg DR tablet Take 800 mg by mouth two (2) times a day. 2/12/15  Yes Provider, Historical  
dilTIAZem XR (DILACOR XR) 180 mg XR capsule Take 180 mg by mouth daily (with dinner). Yes Provider, Historical  
flecainide (TAMBOCOR) 50 mg tablet Take 50 mg by mouth every twelve (12) hours. Yes Provider, Historical  
DULoxetine (CYMBALTA) 30 mg capsule Take 30 mg by mouth daily (with dinner). Yes Provider, Historical  
VIT A,C & E/B3/B2/LUT/MIN/GLUT (EYE-EMILY EXTRA + LUTEIN PO) Take  by mouth. Yes Provider, Historical  
atorvastatin (LIPITOR) 40 mg tablet Take 40 mg by mouth every morning. Take day of surgery per anesthesia protocol.   Indications: HYPERCHOLESTEROLEMIA   Yes Provider, Historical  
 predniSONE (DELTASONE) 5 mg tablet Take 5 mg by mouth daily. Indications: Ulcerative Colitis   Yes Provider, Historical  
  
 
Allergies Allergen Reactions  Ambien [Zolpidem] Other (comments) Confusion  Codeine Other (comments) \"jittery\" Review of Systems: The remainder of the admission historical database is as outlined in the Clinical Record. Objective:  
 
Vitals:  
 02/26/21 1400 BP: 130/68 Pulse: 86 Resp: 24 Temp: 99.7 °F (37.6 °C) Physical Exam: 
 
Vital signs as outlined. Skin examination revealed senile changes only. Head and neck examination revealed no jaundice, cervical adenopathy or masses. Cardiopulmonary examination reveals few scattered rhonchi with no respiratory distress and a regular rhythm with no diastolic murmur or rub. Abdominal examination reveals a healing gastrojejunostomy scar with moderate tenderness and some guarding but no rebound tenderness. Extremities reveal osteoarthritic changes and mild edema with no calf tenderness or peripheral ischemic changes. Neurologic examination reveals patient to be poorly responsive to tactile and verbal stimuli but having received 2 doses of morphine within the past several hours. Assessment:  
 
Hospital Problems  Date Reviewed: 2/26/2021 Codes Class Noted POA * (Principal) Chronic kidney disease ICD-10-CM: N18.9 ICD-9-CM: 585.9  11/5/2017 Yes Acute hemorrhagic pancreatitis ICD-10-CM: K85.90 ICD-9-CM: 126.1  2/26/2021 Unknown Renal cell cancer, right (HCC) ICD-10-CM: C64.1 ICD-9-CM: 189.0  2/26/2021 Unknown Renal failure, chronic, stage 5 (HCC) ICD-10-CM: N18.5 ICD-9-CM: 585.5  2/26/2021 Unknown Plan:  
 
Current Facility-Administered Medications Medication Dose Route Frequency  sodium chloride (NS) flush 3 mL  3 mL IntraVENous PRN  
 morphine (ROXANOL) concentrated oral syringe 10 mg  10 mg Oral Q30MIN PRN  Or  
  morphine (ROXANOL) concentrated oral syringe 10 mg  10 mg SubLINGual Q30MIN PRN  
 acetaminophen (TYLENOL) tablet 650 mg  650 mg Oral Q4H PRN  
 acetaminophen (TYLENOL) suppository 650 mg  650 mg Rectal Q3H PRN  
 haloperidol lactate (HALDOL) injection 2 mg  2 mg SubCUTAneous Q1H PRN Or  
 haloperidol lactate (HALDOL) injection 2 mg  2 mg IntraVENous Q1H PRN  
 glycopyrrolate (ROBINUL) injection 0.2 mg  0.2 mg SubCUTAneous Q4H PRN  
 hyoscyamine SL (LEVSIN/SL) tablet 0.125 mg  0.125 mg SubLINGual Q4H PRN  
 LORazepam (ATIVAN) injection 1 mg  1 mg IntraVENous Q4H PRN  
 morphine injection 4 mg  4 mg IntraVENous Q20MIN PRN  
 morphine injection 4 mg  4 mg SubCUTAneous Q20MIN PRN This patient is critically ill with progressive renal insufficiency complicating his hemorrhagic pancreatitis, prior nephrectomy and residual cystic kidney disease. His last BUN was 200. I anticipate he will probably die this week and I suspect he will require sufficient opioid narcotics for his pain to preclude significant awakening from his delirium. I discussed all of this with the family and his wife appears to have a very good understanding of previous discussions as well as our talk today. Signed By: Maricruz Dee MD   
 February 26, 2021

## 2021-02-27 NOTE — DEATH NOTE
Pt with no apical pulse, no blood pressure, no respirations, and  unresponsive to verbal or tactile stimuli. Wife is at bedside and is coping well. 3441 Post mortem completed. Discussed with wife Jean-Paul Allen and she requests Arielle Lopez be called for removal. Ongoing emotional support provided. Calling a family member to be with her.  
 
[address] notified for removal.  
 
[id number] Awaiting arrival of mortuary. Family coping well.

## 2021-02-27 NOTE — [facility] NURSE NOTES
Patient: Vivienne Kowalski 
 
Date: 02/26/21 Time:14:00 PM 
 
[facility] Nurse Notes 1400- Patient arrived on the unit via ambulance transport from [address] [place] [address]. Patient is being admitted GIP with diagnosis of Progressive Renal Failure. The patient is on room air and has respirations at 16 even and unlabored. Patient was transferred to the bed from the stretcher with no problems with 3 assist. The patient has new area to upper abdomen where feeding tube was pulled before leaving. The area is draining green secretions. Cleaned with saline and placed new 4x4's and abdomen pad over the site. Coles catheter in place draining yellow urine. Picc double lumen in place to right arm. Lung sounds clear but diminished at base. Bowel sounds times four quads positive. Patient has edema to right knee and it is warm to touch. Patient has knot to later right foot. It is red and warm to touch. Feet are plus 2 edema and cool to touch. Patient has foam dressings to bilateral upper extremities. Will not remove as skin is fragile. Scattered healing scabbed areas to bilateral upper extremities. Stage two to sacral area that has excoriation around it. Patient is positioned for comfort. His heels are floated. The patient is grimacing and is restless. Will medicated when orders are in. Admission complete and initial general hospice care plan initiated which includes spiritual,psychosocial and bereavement. No immediate needs recognized. IDG team made aware of plan of care.  
 
 
 
 
Signed by: Monica Alvarenga RN

## 2021-02-27 NOTE — PROGRESS NOTES
Problem: Falls - Risk of 
Goal: *Absence of Falls Description: Document Mikayla Carter Fall Risk and appropriate interventions in the flowsheet. Outcome: Progressing Towards Goal 
Note: Fall Risk Interventions: 
  
 
  
 
Medication Interventions: Bed/chair exit alarm, Evaluate medications/consider consulting pharmacy Elimination Interventions: Bed/chair exit alarm, Call light in reach History of Falls Interventions: Bed/chair exit alarm, Door open when patient unattended Problem: Pressure Injury - Risk of 
Goal: *Prevention of pressure injury Description: Document Maikel Scale and appropriate interventions in the flowsheet. Outcome: Progressing Towards Goal 
Note: Pressure Injury Interventions: 
Sensory Interventions: Assess changes in LOC, Float heels, Keep linens dry and wrinkle-free Moisture Interventions: Absorbent underpads, Apply protective barrier, creams and emollients, Assess need for specialty bed, Internal/External urinary devices, Limit adult briefs, Minimize layers, Moisture barrier Activity Interventions: Assess need for specialty bed Mobility Interventions: Assess need for specialty bed, Float heels Nutrition Interventions: Document food/fluid/supplement intake Friction and Shear Interventions: Apply protective barrier, creams and emollients, HOB 30 degrees or less, Lift sheet, Minimize layers Problem: Pain Goal: *Control of Pain Description: Morphine 4 mg IVP every 20 minutes as needed for pain Roxanol 10 mg po every 30 minutes prn pain Outcome: Progressing Towards Goal 
  
Problem: Anxiety/Agitation Goal: Verbalize and demonstrate ability to manage anxiety Description: The patient/family/caregiver will verbalize and demonstrate ability to manage the patient's anxiety throughout hospice care. Outcome: Progressing Towards Goal 
Note: Haldol 2 mg IV every 1 hour prn agitation/anxiety Ativan 1 mg IV every 4 hours prn anxiety/agitation

## 2021-02-27 NOTE — PROGRESS NOTES
Walking rounds completed with off going RN. Pt identified by name/. Eyes closed. Grimacing with some dyspnea observed. FLACC 4/10. Respirations 24/minute. Medicated with Morphine 4 mg IVP for pain/dyspnea. Emotional support provided. No agitation noted at this time. Pt is GIP level of care for hospice dx of progressive renal failure for symptom management of pain/management. Wife Marci Miller at bedside and emotional support provided. Bed in low and locked position with side rails up x 2 with call light in reach.  Resting peacefully with eyes closed. Respirations easy and unlabored now. FLACC 0/10. Wife voices that she is \"so thankful to be here and able to be with him. \" We discuss death and dying and she is accepting of this and is coping well. All care continued.  Morphine 4 mg given IVP for pain 5/10 flacc associated with repositioning and Ativan 1 mg given IVP for anxiety. Pt restless. Emotional support provided Safety maintained. Wound care provided to sacral stage 2 with excoriation around it,pressure offset. 2200 Resting peacefully with eyes closed. Respirations easy and unlabored. Facial features flat,relaxed. FLACC 0/10. No s/sx of pain,anxiety,nausea or distress. Bed in low and locked position with side rails up x 2 and call light in reach. Wife sleeping at bedside. 0015  Resting peacefully with eyes closed. Respirations easy and unlabored. Facial features flat,relaxed. FLACC 0/10. No s/sx of pain,anxiety,nausea or distress. Bed in low and locked position with side rails up x 2 and call light in reach. Wife sleeping at bedside. 0140 Morphine 4 mg given IV for dyspnea. Acyanotic. Oxygen continues. Wife sleeping at bedside. 0230  Resting peacefully with eyes closed. Respirations easy and unlabored. Facial features flat,relaxed. FLACC 0/10. No s/sx of pain,anxiety,nausea or distress. Bed in low and locked position with side rails up x 2 and call light in reach. 7102 Restless. Dyspnea noted. Spoke to wife at bedside. Emotional support provided. Medicated with Morphine 4 mg IVP for dyspnea. Explained to wife he is going to be repositioned soon as this will help assure he is as comfortable as possible with repositioning. Wife voices thanks and understanding. States \"that's all I could ask for is him to be comfortable.\"

## (undated) DEVICE — SUTURE 5 MERS GRN 30 TO 40 IN D9211

## (undated) DEVICE — AGENT HEMSTAT W3XL4IN OXIDIZED REGENERATED CELOS ABSRB FOR

## (undated) DEVICE — SUTURE VCRL SZ 0 L54IN ABSRB UD POLYGLACTIN 910 COAT BRAID J608H

## (undated) DEVICE — LAP CHOLE: Brand: MEDLINE INDUSTRIES, INC.

## (undated) DEVICE — X-RAY SPONGES,12 PLY: Brand: DERMACEA

## (undated) DEVICE — BASIC SINGLE BASIN BTC-LF: Brand: MEDLINE INDUSTRIES, INC.

## (undated) DEVICE — UNIVERSAL STAPLER: Brand: ENDO GIA ULTRA

## (undated) DEVICE — DRESSING IN STRIPPABLE ENVELOPE: Brand: DERMACEA

## (undated) DEVICE — REM POLYHESIVE ADULT PATIENT RETURN ELECTRODE: Brand: VALLEYLAB

## (undated) DEVICE — INTENDED FOR TISSUE SEPARATION, AND OTHER PROCEDURES THAT REQUIRE A SHARP SURGICAL BLADE TO PUNCTURE OR CUT.: Brand: BARD-PARKER ® STAINLESS STEEL BLADES

## (undated) DEVICE — FAN SPRAY KIT: Brand: PULSAVAC®

## (undated) DEVICE — SOLUTION IRRIG 3000ML H2O STRL BAG

## (undated) DEVICE — KENDALL SCD EXPRESS SLEEVES, KNEE LENGTH, MEDIUM: Brand: KENDALL SCD

## (undated) DEVICE — SUTURE PDS II SZ 1 L36IN ABSRB VLT L48MM CTX 1/2 CIR Z371T

## (undated) DEVICE — AGENT HEMSTAT W2XL14IN OXIDIZED REGENERATED CELOS ABSRB FOR

## (undated) DEVICE — SUTURE MCRYL SZ 2-0 L27IN ABSRB VLT CT-1 L36MM 1/2 CIR TAPR Y339H

## (undated) DEVICE — IMMOBILIZER KNEE PREMIER PRO TRI PNL 24INCH FOAM TIETEX PAT

## (undated) DEVICE — 3M™ IOBAN™ 2 ANTIMICROBIAL INCISE DRAPE 6650EZ: Brand: IOBAN™ 2

## (undated) DEVICE — ARTICULATION RELOAD WITH TRI-STAPLE TECHNOLOGY: Brand: ENDO GIA

## (undated) DEVICE — TRAY PREP DRY W/ PREM GLV 2 APPL 6 SPNG 2 UNDPD 1 OVERWRAP

## (undated) DEVICE — CATHETER URETH 16FR BLLN 5CC STD LTX 2 W F TWO OPP DRNGE

## (undated) DEVICE — SHEARS ENDOSCP L36CM DIA5MM ULTRASONIC CRV TIP W/ ADV

## (undated) DEVICE — POUCH SPEC RETRV SHFT 15MM 13X23CM GRN POLYUR DBL RNG HNDL

## (undated) DEVICE — DERMABOND SKIN ADH 0.7ML -- DERMABOND ADVANCED 12/BX

## (undated) DEVICE — CYSTO/BLADDER IRRIGATION SET, REGULATING CLAMP

## (undated) DEVICE — DRAPE,U/SHT,SPLIT,FILM,60X84,STERILE: Brand: MEDLINE

## (undated) DEVICE — GUIDEWIRE ENDOSCP L150CM DIA0.038IN TIP L3CM PTFE FLX STR

## (undated) DEVICE — 2108 SERIES SAGITTAL BLADE (24.8 X 0.88 X 90.5MM)

## (undated) DEVICE — GDWIRE 3CM FLX-TIP 0.038X150CM -- BX/5 SENSOR

## (undated) DEVICE — SLIM BODY SKIN STAPLER: Brand: APPOSE ULC

## (undated) DEVICE — 1010 S-DRAPE TOWEL DRAPE 10/BX: Brand: STERI-DRAPE™

## (undated) DEVICE — [HIGH FLOW INSUFFLATOR,  DO NOT USE IF PACKAGE IS DAMAGED,  KEEP DRY,  KEEP AWAY FROM SUNLIGHT,  PROTECT FROM HEAT AND RADIOACTIVE SOURCES.]: Brand: PNEUMOSURE

## (undated) DEVICE — SOLUTION IV 1000ML 0.9% SOD CHL

## (undated) DEVICE — SUTURE MCRYL SZ 0 L27IN ABSRB VLT CT-1 L36MM 1/2 CIR TAPR Y340H

## (undated) DEVICE — DRAPE TWL SURG 16X26IN BLU ORB04] ALLCARE INC]

## (undated) DEVICE — BANDAGE E W6INXL11YD CLP CLSR DBL LEN FLEX-MASTER

## (undated) DEVICE — SUTURE VCRL SZ 2-0 L27IN ABSRB UD L26MM SH 1/2 CIR J417H

## (undated) DEVICE — SUTURE VCRL SZ 0 L36IN ABSRB UD L36MM CT-1 1/2 CIR J946H

## (undated) DEVICE — 2000CC GUARDIAN II: Brand: GUARDIAN

## (undated) DEVICE — VISUALIZATION SYSTEM: Brand: CLEARIFY

## (undated) DEVICE — 3M™ COBAN™ NL STERILE NON-LATEX SELF-ADHERENT WRAP, 2086S, 6 IN X 5 YD (15 CM X 4,5 M), 12 ROLLS/CASE: Brand: 3M™ COBAN™

## (undated) DEVICE — 2, DISPOSABLE SUCTION/IRRIGATOR WITHOUT DISPOSABLE TIP: Brand: STRYKEFLOW

## (undated) DEVICE — LOGICUT SCISSOR LENGTH 320MM: Brand: LOGI - LAPAROSCOPIC INSTRUMENT SYSTEM

## (undated) DEVICE — CLIP SUT ENDOSCP F/2-0/3-0/4-0 -- LAPRA-TY

## (undated) DEVICE — GOWN,REINF,POLY,ECL,PP SLV,XL: Brand: MEDLINE

## (undated) DEVICE — X-LARGE COTTON GLOVE: Brand: DEROYAL

## (undated) DEVICE — TRAY CATH 16F DRN BG LTX -- CONVERT TO ITEM 363158

## (undated) DEVICE — GOWN,REINFORCED,POLY,AURORA,XXLARGE,STR: Brand: MEDLINE

## (undated) DEVICE — SURGIFOAM SPNG SZ 100

## (undated) DEVICE — (D)SPONGE DISECT ENDOSCP KTTNR -- DISC BY MFR

## (undated) DEVICE — ACCESS PLATFORM FOR MINIMALLY INVASIVE SURGERY.: Brand: GELPORT® LAPAROSCOPIC  SYSTEM

## (undated) DEVICE — SYR IRR CATH TIP LR ADPT 70ML -- CONVERT TO ITEM 363120

## (undated) DEVICE — SPONGE LAP 18X18IN STRL -- 5/PK

## (undated) DEVICE — ENDOSCOPIC VALVE WITH ADAPTER.: Brand: SURSEAL® II

## (undated) DEVICE — DRAPE TBL W72XH34IN D30IN SGL PC DISPOSABLE

## (undated) DEVICE — E-Z CLEAN, PTFE COATED, ELECTROSURGICAL LAPAROSCOPIC ELECTRODE, SPATULA, 33 CM., SINGLE-USE, FOR USE WITH HAND CONTROL PENCIL: Brand: MEGADYNE

## (undated) DEVICE — SUTURE PDS II SZ 1 L96IN ABSRB VLT TP-1 L65MM 1/2 CIR Z880G

## (undated) DEVICE — RETRIEVER SUT ARTHSCP HOFFEE --

## (undated) DEVICE — 3000CC GUARDIAN II: Brand: GUARDIAN

## (undated) DEVICE — CATHETER URETH 20FR 5CC BLLN F 3 W SPEC INF CTRL BARDX

## (undated) DEVICE — SOLUTION IRRIG 3000ML 0.9% SOD CHL FLX CONT 0797208] ICU MEDICAL INC]

## (undated) DEVICE — Device

## (undated) DEVICE — (D)PREP SKN CHLRAPRP APPL 26ML -- CONVERT TO ITEM 371833

## (undated) DEVICE — CYSTO: Brand: MEDLINE INDUSTRIES, INC.

## (undated) DEVICE — 3M™ IOBAN™ 2 ANTIMICROBIAL INCISE DRAPE 6648EZ: Brand: IOBAN™ 2

## (undated) DEVICE — BLADELESS OPTICAL TROCAR WITH FIXATION CANNULA: Brand: VERSAONE